# Patient Record
Sex: FEMALE | Race: WHITE
[De-identification: names, ages, dates, MRNs, and addresses within clinical notes are randomized per-mention and may not be internally consistent; named-entity substitution may affect disease eponyms.]

---

## 2017-08-12 ENCOUNTER — HOSPITAL ENCOUNTER (INPATIENT)
Dept: HOSPITAL 84 - D.ER | Age: 52
LOS: 4 days | Discharge: HOME | DRG: 313 | End: 2017-08-16
Attending: FAMILY MEDICINE | Admitting: FAMILY MEDICINE
Payer: COMMERCIAL

## 2017-08-12 VITALS
BODY MASS INDEX: 30.94 KG/M2 | HEIGHT: 66 IN | BODY MASS INDEX: 30.94 KG/M2 | WEIGHT: 192.53 LBS | HEIGHT: 66 IN | WEIGHT: 192.53 LBS

## 2017-08-12 DIAGNOSIS — M32.9: ICD-10-CM

## 2017-08-12 DIAGNOSIS — K21.9: ICD-10-CM

## 2017-08-12 DIAGNOSIS — M35.00: ICD-10-CM

## 2017-08-12 DIAGNOSIS — I16.0: ICD-10-CM

## 2017-08-12 DIAGNOSIS — E11.9: ICD-10-CM

## 2017-08-12 DIAGNOSIS — R07.9: Primary | ICD-10-CM

## 2017-08-12 LAB
ALBUMIN SERPL-MCNC: 3.6 G/DL (ref 3.4–5)
ALP SERPL-CCNC: 108 U/L (ref 46–116)
ALT SERPL-CCNC: 44 U/L (ref 10–68)
ANION GAP SERPL CALC-SCNC: 8.5 MMOL/L (ref 8–16)
BASOPHILS NFR BLD AUTO: 0.2 % (ref 0–2)
BILIRUB SERPL-MCNC: 0.32 MG/DL (ref 0.2–1.3)
BUN SERPL-MCNC: 15 MG/DL (ref 7–18)
CALCIUM SERPL-MCNC: 9.2 MG/DL (ref 8.5–10.1)
CHLORIDE SERPL-SCNC: 100 MMOL/L (ref 98–107)
CK MB SERPL-MCNC: 0.8 U/L (ref 0–3.6)
CK SERPL-CCNC: 81 UL (ref 21–215)
CO2 SERPL-SCNC: 32.4 MMOL/L (ref 21–32)
CREAT SERPL-MCNC: 0.9 MG/DL (ref 0.6–1.3)
EOSINOPHIL NFR BLD: 0.9 % (ref 0–7)
ERYTHROCYTE [DISTWIDTH] IN BLOOD BY AUTOMATED COUNT: 13 % (ref 11.5–14.5)
GLOBULIN SER-MCNC: 4.4 G/L
GLUCOSE SERPL-MCNC: 296 MG/DL (ref 74–106)
HCT VFR BLD CALC: 39.5 % (ref 36–48)
HGB BLD-MCNC: 13.1 G/DL (ref 12–16)
IMM GRANULOCYTES NFR BLD: 0 % (ref 0–5)
LYMPHOCYTES NFR BLD AUTO: 32.6 % (ref 15–50)
MCH RBC QN AUTO: 30.3 PG (ref 26–34)
MCHC RBC AUTO-ENTMCNC: 33.2 G/DL (ref 31–37)
MCV RBC: 91.2 FL (ref 80–100)
MONOCYTES NFR BLD: 7.3 % (ref 2–11)
NEUTROPHILS NFR BLD AUTO: 59 % (ref 40–80)
OSMOLALITY SERPL CALC.SUM OF ELEC: 285 MOSM/KG (ref 275–300)
PLATELET # BLD: 241 10X3/UL (ref 130–400)
PMV BLD AUTO: 10.4 FL (ref 7.4–10.4)
POTASSIUM SERPL-SCNC: 3.9 MMOL/L (ref 3.5–5.1)
PROT SERPL-MCNC: 8 G/DL (ref 6.4–8.2)
RBC # BLD AUTO: 4.33 10X6/UL (ref 4–5.4)
SODIUM SERPL-SCNC: 137 MMOL/L (ref 136–145)
TROPONIN I SERPL-MCNC: < 0.017 NG/ML (ref 0–0.06)
WBC # BLD AUTO: 5.6 10X3/UL (ref 4.8–10.8)

## 2017-08-12 NOTE — ST
PATIENT:DIANE KLEIN                        MEDICAL RECORD: [id number]
SEX: F                                                   LOCATION:D.     D.212
ORDER #:                                                 ADMISSION DATE: 08/13/17
AGE OF PATIENT: 52            
 
REFERRING PHYSICIAN:                                                             
 
INTERPRETING PHYSICIAN: EMIR ROSARIO MD             

 
 
DATE OF SERVICE:  08/15/2017
 
Nuclear Stress Test
 
INDICATION:  Chest pain of unknown etiology.  She was exercised on standard
Lexiscan protocol with 30.5 mCi of sestamibi injected at peak stress.  Rest
images were done previously with 12.5 mCi.
 
FINDINGS:  Gated SPECT reveals preserved ejection fraction at 71% with good wall
motion and thickening and brightening throughout all segments.  SPECT imaging
Cardiolite was used as myocardial perfusion agent.  There is homogeneous uptake
throughout all segments at rest and stress with no evidence of inducible
ischemia or previous infarction.
 
OVERALL IMPRESSION:
1.  This is a normal nuclear stress test with no evidence of inducible ischemia
or previous infarction.
2.  Gated SPECT reveals preserved ejection fraction at 71%.  In this patient
with ongoing symptomatology, the current scan does not suggest the presence of
hemodynamically significant coronary artery disease.  Evaluate noncardiac
etiology of chest pain.
 
TRANSINT:[id number] Voice Confirmation ID: [id number] DOCUMENT ID: [id number]
 
 
                                           
                                           EMIR ROSARIO MD             
 
 
 
 
 
 
 
 
 
 
 
 
CC: CLEO POE DO                                          [id number]
DICTATION DATE: 08/16/17 1151     :     08/16/17 2146      DIS IN  
                                                                      08/16/17
[id number] [place] [address]

## 2017-08-13 VITALS — SYSTOLIC BLOOD PRESSURE: 119 MMHG | DIASTOLIC BLOOD PRESSURE: 65 MMHG

## 2017-08-13 VITALS — SYSTOLIC BLOOD PRESSURE: 129 MMHG | DIASTOLIC BLOOD PRESSURE: 64 MMHG

## 2017-08-13 VITALS — DIASTOLIC BLOOD PRESSURE: 52 MMHG | SYSTOLIC BLOOD PRESSURE: 103 MMHG

## 2017-08-13 VITALS — DIASTOLIC BLOOD PRESSURE: 77 MMHG | SYSTOLIC BLOOD PRESSURE: 140 MMHG

## 2017-08-13 VITALS — DIASTOLIC BLOOD PRESSURE: 60 MMHG | SYSTOLIC BLOOD PRESSURE: 127 MMHG

## 2017-08-13 VITALS — SYSTOLIC BLOOD PRESSURE: 112 MMHG | DIASTOLIC BLOOD PRESSURE: 53 MMHG

## 2017-08-13 LAB
CK MB SERPL-MCNC: 1.3 U/L (ref 0–3.6)
CK MB SERPL-MCNC: 1.7 U/L (ref 0–3.6)
CK SERPL-CCNC: 130 UL (ref 21–215)
CK SERPL-CCNC: 138 UL (ref 21–215)
TROPONIN I SERPL-MCNC: < 0.017 NG/ML (ref 0–0.06)
TROPONIN I SERPL-MCNC: < 0.017 NG/ML (ref 0–0.06)

## 2017-08-13 NOTE — NUR
PT C/O ITCHING WITH MORPHINE AND IT REQUESTING MORPHINE NOW FOR CHEST PAIN AND
ALSO WANTS BENADRYL FOR THE ITCHING. PAGE AND RETURN CALL FROM DR VILLARREAL AND
GAVE ORDERS TO STOP THE MORPHINE, THAT THE PAIN IS NOT CARDIAC IN NATURE AND
TO GIVE LORTAB 7.5MG EVERY FOUR HOURS AS NEEDED FOR PAIN.

## 2017-08-13 NOTE — NUR
MORPHNE 5MG SIVP GIVEN FOR C/O CP 7/10. PHENERGAN 25MG IV TO L GLUTEAL FOR C/O
NAUSEA.  WILL CONTINUE TO MONITOR.

## 2017-08-13 NOTE — NUR
ADMISSION ASSESSMENT, HISTORY AND HOME MED LIST COMPLETED.  SR PER CM.  O2
2LNC.  NO DISTRESS NOTED. PT HAS C/O CP 7/10.  WILL ADMINISTER IV MORPHINE.
WILL CONTINUE TO MONITOR.

## 2017-08-13 NOTE — NUR
ASSESSMENT COMPLETED. TELEMERTY SHOWS SR 66. 02 AT 2 L/M PER NC. SL TO RIGHT
AC. UP AB SAM. DENIES ANY NEEDS. CALL LIGHT IN REACH WITH SR UP. WILL MONITOR

## 2017-08-13 NOTE — NUR
PT RESTING IN BED WITH  AT BEDSIDE. ALERT/ORIENTED. REQUESTING PAIN
MED, BUT WANTS TO RECEIVE BENADRYL AT THE SAME TIME. STATES PAIN IN CHEST WHEN
SHE BREATHES IN AND OUT. PT WILL BE NPO AFTER MIDNIGHT FOR CARDIOLYTE STRESS
TEST IN AM. SR PER TELEMETRY. O2 @ 2L/NC WITH NONLABORED RESPIRATIONS. FSBS
EVERY 6 HOURS. SEE ASSESSMENT. CPOC.

## 2017-08-13 NOTE — NUR
VSS THROUGHOUT NIGHT.  SR PER CM.  PT RESTED WELL AFTER MORPHINE AND PHENERGAN
ADMINISTRATION.  NEEDS MET;WILL CONTINUE TO MONITOR.

## 2017-08-14 VITALS — DIASTOLIC BLOOD PRESSURE: 54 MMHG | SYSTOLIC BLOOD PRESSURE: 99 MMHG

## 2017-08-14 VITALS — SYSTOLIC BLOOD PRESSURE: 100 MMHG | DIASTOLIC BLOOD PRESSURE: 56 MMHG

## 2017-08-14 VITALS — SYSTOLIC BLOOD PRESSURE: 115 MMHG | DIASTOLIC BLOOD PRESSURE: 62 MMHG

## 2017-08-14 VITALS — DIASTOLIC BLOOD PRESSURE: 54 MMHG | SYSTOLIC BLOOD PRESSURE: 110 MMHG

## 2017-08-14 VITALS — DIASTOLIC BLOOD PRESSURE: 53 MMHG | SYSTOLIC BLOOD PRESSURE: 115 MMHG

## 2017-08-14 VITALS — DIASTOLIC BLOOD PRESSURE: 53 MMHG | SYSTOLIC BLOOD PRESSURE: 112 MMHG

## 2017-08-14 LAB
ALBUMIN SERPL-MCNC: 3 G/DL (ref 3.4–5)
ALP SERPL-CCNC: 134 U/L (ref 46–116)
ALT SERPL-CCNC: 111 U/L (ref 10–68)
ANION GAP SERPL CALC-SCNC: 7.9 MMOL/L (ref 8–16)
BASOPHILS NFR BLD AUTO: 0.2 % (ref 0–2)
BILIRUB SERPL-MCNC: 0.27 MG/DL (ref 0.2–1.3)
BUN SERPL-MCNC: 26 MG/DL (ref 7–18)
CALCIUM SERPL-MCNC: 8.5 MG/DL (ref 8.5–10.1)
CHLORIDE SERPL-SCNC: 99 MMOL/L (ref 98–107)
CO2 SERPL-SCNC: 33.2 MMOL/L (ref 21–32)
CREAT SERPL-MCNC: 1 MG/DL (ref 0.6–1.3)
EOSINOPHIL NFR BLD: 0.3 % (ref 0–7)
ERYTHROCYTE [DISTWIDTH] IN BLOOD BY AUTOMATED COUNT: 13.5 % (ref 11.5–14.5)
GLOBULIN SER-MCNC: 3.8 G/L
GLUCOSE SERPL-MCNC: 142 MG/DL (ref 74–106)
HCT VFR BLD CALC: 35.3 % (ref 36–48)
HGB BLD-MCNC: 11.7 G/DL (ref 12–16)
IMM GRANULOCYTES NFR BLD: 0.2 % (ref 0–5)
LYMPHOCYTES NFR BLD AUTO: 17 % (ref 15–50)
MCH RBC QN AUTO: 30.4 PG (ref 26–34)
MCHC RBC AUTO-ENTMCNC: 33.1 G/DL (ref 31–37)
MCV RBC: 91.7 FL (ref 80–100)
MONOCYTES NFR BLD: 7.4 % (ref 2–11)
NEUTROPHILS NFR BLD AUTO: 74.9 % (ref 40–80)
OSMOLALITY SERPL CALC.SUM OF ELEC: 278 MOSM/KG (ref 275–300)
PLATELET # BLD: 234 10X3/UL (ref 130–400)
PMV BLD AUTO: 10.8 FL (ref 7.4–10.4)
POTASSIUM SERPL-SCNC: 4.1 MMOL/L (ref 3.5–5.1)
PROT SERPL-MCNC: 6.8 G/DL (ref 6.4–8.2)
RBC # BLD AUTO: 3.85 10X6/UL (ref 4–5.4)
SODIUM SERPL-SCNC: 136 MMOL/L (ref 136–145)
WBC # BLD AUTO: 6.3 10X3/UL (ref 4.8–10.8)

## 2017-08-14 NOTE — NUR
RECEIVED REPORT, WILL ASSUME CARE OF PT, WATCHING TV, DENIES ANY NEEDS, CALL
LIGHT IN REACH, BED IS LOW, SRX2, WILL CONTINUE PLAN OF CARE

## 2017-08-15 VITALS — SYSTOLIC BLOOD PRESSURE: 122 MMHG | DIASTOLIC BLOOD PRESSURE: 65 MMHG

## 2017-08-15 VITALS — DIASTOLIC BLOOD PRESSURE: 68 MMHG | SYSTOLIC BLOOD PRESSURE: 134 MMHG

## 2017-08-15 VITALS — SYSTOLIC BLOOD PRESSURE: 122 MMHG | DIASTOLIC BLOOD PRESSURE: 56 MMHG

## 2017-08-15 VITALS — SYSTOLIC BLOOD PRESSURE: 123 MMHG | DIASTOLIC BLOOD PRESSURE: 62 MMHG

## 2017-08-15 VITALS — SYSTOLIC BLOOD PRESSURE: 148 MMHG | DIASTOLIC BLOOD PRESSURE: 77 MMHG

## 2017-08-15 NOTE — NUR
ASSESSMENT COMPLETE, SEE FLOWSHEET, PT SLEEPING, BED IS LOW, SRX2, CALL LIGHT
IN REACH, WILL CONTINUE TO MONITOR

## 2017-08-16 VITALS — DIASTOLIC BLOOD PRESSURE: 80 MMHG | SYSTOLIC BLOOD PRESSURE: 164 MMHG

## 2017-08-16 VITALS — DIASTOLIC BLOOD PRESSURE: 82 MMHG | SYSTOLIC BLOOD PRESSURE: 197 MMHG

## 2017-08-16 LAB
ANION GAP SERPL CALC-SCNC: 9.3 MMOL/L (ref 8–16)
BUN SERPL-MCNC: 13 MG/DL (ref 7–18)
CALCIUM SERPL-MCNC: 8.6 MG/DL (ref 8.5–10.1)
CHLORIDE SERPL-SCNC: 103 MMOL/L (ref 98–107)
CO2 SERPL-SCNC: 30.9 MMOL/L (ref 21–32)
CREAT SERPL-MCNC: 0.6 MG/DL (ref 0.6–1.3)
GLUCOSE SERPL-MCNC: 196 MG/DL (ref 74–106)
OSMOLALITY SERPL CALC.SUM OF ELEC: 282 MOSM/KG (ref 275–300)
POTASSIUM SERPL-SCNC: 4.2 MMOL/L (ref 3.5–5.1)
SODIUM SERPL-SCNC: 139 MMOL/L (ref 136–145)

## 2017-08-16 NOTE — NUR
WENT OVER DC INSTRUCTIONS WTIH PT AND  BOTH VERBALIZE UNDERSTANDING. DC
PIV WITH CATH TIP INTACT DC TELE AND RETURNED TO MONITOR TECH. PT REFUSED
WHEELCHAIR AND WALKED OUT WITH

## 2017-08-16 NOTE — DS
PATIENT:DIANE KLEIN                :[birth year]   MEDICAL RECORD: [id number]
 
                              DISCHARGE SUMMARY
                                                         
ADMISSION DATE:    17                       DISCHARGE DATE:     17
 
 
DATE OF ADMISSION:  2017
 
DATE OF DISCHARGE:  2017
 
ADMISSION DIAGNOSES:  Chest pain, hypertensive crisis, history of
hypothyroidism, diabetes, lupus, Sjogren syndrome and gastroparesis.
 
DISCHARGE DIAGNOSES:  Hypertensive crisis, chest pain, diabetes, lupus,
Sjogren's and hypertension.
 
HOSPITAL COURSE:  The patient was admitted to the Emergency Room with
significantly elevated blood pressure with anginal symptoms.  Blood pressure was
controlled.  Cardiology consulted.  She had a normal echocardiogram.  She
underwent a nuclear stress test, which showed no evidence of ischemic disease. 
Cleared for discharge by cardiology.  Discharged home in stable condition.  We
will follow up in the clinic on Friday.  Medications per med rec at this time. 
Adjustments in blood pressure medications have been made.  The patient is
anxious to go home.  We will complete workup as an outpatient.
 
PHYSICAL EXAMINATION:
VITAL SIGNS:  On discharge, temperature 97.9, blood pressure 148/77, heart rate
74, respirations 16 and O2 sats 98%.
GENERAL:  Alert and oriented, no acute distress.   present, anxious for
discharge.
HEART:  Regular rate and rhythm.
LUNGS:  Clear.
ABDOMEN:  Soft and nontender.
EXTREMITIES:  Present times 4.
NEUROLOGIC:  Intact.  No focal deficits.
 
LABORATORY DATA:  Chemistry on discharge:  Sodium 139, potassium 4.2, chloride
103, bicarbonate 30.9, BUN 13, creatinine 0.6, glucose 196 and calcium 8.6. 
Cardiac enzymes were cycled and have remained negative.
 
DISPOSITION:  The patient is discharged again in stable and improved condition. 
We will follow up on Friday and remain off work until evaluation on Friday.
 
TRANSINT:[id number] Voice Confirmation ID: [id number] DOCUMENT ID: [id number]
                                           
                                           CLEO POE DO            
 
 
 
Electronically Signed by CLEO LEHMAN on 17 at 1750
CC:                                                             [id number]
DICTATION DATE: 17 0748     :     17 0833      DIS IN  
                                                                      17
[id number] [place] [address]

## 2017-08-16 NOTE — EC
PATIENT:DIANE KLEIN              DATE OF SERVICE: 08/13/17
SEX: F                                  MEDICAL RECORD: [id number]
DATE OF BIRTH: [birth year]                        LOCATION:D.      D.212
AGE OF PATIENT: 52                             ADMISSION DATE: 08/13/17
 
REFERRING PHYSICIAN:                               
 
INTERPRETING PHYSICIAN: NORBERTO VILLARREAL MD              
 
 
 
                             ECHOCARDIOGRAM REPORT
  ECHO CHARGES 4               ECHO COMPLETE            
 
 
 
CLINICAL DIAGNOSIS: CP                            
 
                         ECHOCARDIOGRAPHIC MEASUREMENTS
      (adult normal given)
   AC root (d.<3.7cm) 2.9  cm   LV Septum d (<1.2 cm> 1.2  cm
      Valve Excursion 1.5  cm     LV Septum (systole) 1.9  cm
Left Atria (s.<4.0cm> 3.8  cm          LVPW d(<1.2cm) 1.3  cm
        RV (d.<2.3cm) 2.4  cm           LVPW (sytole) 2.1  cm
  LV diastole(<5.6CM) 5.0  cm       MV E-F(>70mm/sec)      cm
           LV systole 2.6  cm           LVOT Diameter 1.7  cm
       MV exc.(>10mm)      cm
Est.ejection fraction (50-75%)     %   Pericardial Effusion N
 
   DOPPLER:
     LVIT      cm/sec A 66.0 cm/sec E 89.0  cm/sec
       LA      cm/sec      RVSP 24.3 mmHg
     LVOT 131  cm/sec   AOP1/2T      m/s
  Asc. Ao 169  cm/sec
     RVOT 68.0 cm/sec
       RA      cm/sec
       PA 97.0 cm/sec
 AV Gradient Peak 11.4 mmHg  AV Mean 6.3  mmHg  AV Area 2.4  cm
 MV Gradient Peak 5.2  mmHg  MV Mean 2.2  mmHg  MV Area      cm
   COMMENTS:                                              
 
 
 Cardiac Sonographer: Tal HARRINGTONOE            
      Cardiologist: 4          Dr. Villarreal               
             TAPE# PACS           
                                                                                     
 
 
DATE OF SERVICE:  08/15/2017
 
PROCEDURE:  Echocardiogram
 
FINDINGS:
1.  The left ventricle, although difficult to visualize, appears to have normal
systolic function, the patient's inflow pattern was normal.  The ejection
fraction 65%.
2.  The right ventricle is normal size and function.
3.  The aortic valve is structurally normal.
 
 
 
ECHOCARDIOGRAM REPORT                          [id number]    DIANE KLEIN      
 
 
4.  The tricuspid valve is structurally normal and normal function.
5.  The aortic valve has mild thickening without any other significant
pathology.
6.  Pulmonic valve is normal.
 
CONCLUSION:  The patient's overall heart study on echocardiography is normal for
the patient's stated age without any significant pathology identified.
 
TRANSINT:[id number] Voice Confirmation ID: [id number] DOCUMENT ID: [id number]
                                           
                                           NORBERTO VILLARREAL MD              
 
 
 
Electronically Signed by NORBERTO VILLARREAL on 08/16/17 at 1203
 
 
 
 
 
 
 
 
 
 
 
 
 
 
 
 
 
 
 
 
 
 
 
 
 
 
 
 
 
 
 
CC:                                                             [id number]
DICTATION DATE: 08/15/17 0857     :     08/15/17 1049      DIS IN  
                                                                      08/16/17
[id number] [place] [address]

## 2017-08-16 NOTE — NUR
Patient Name: DIANE KLEIN Admission Status: ER
Accout number: [id number] Admission Date: 2017
: 1965 Admission Diagnosis:CHEST PAIN, UNSPECIFIED
Attending: FERNANDO Current LOS: 3
 
Anticipated DC Date: 2017
Planned Disposition: Home
Primary Insurance: [employer] O
 
LATE ENTRY:
Discharge Planning Comments:
* Is the patient Alert and Oriented? Yes 0
* How many steps to enter\exit or inside your home? NONE 0
* PCP DR. POE 0
* Pharmacy [facility] 0
* Preadmission Environment Home with Family 0
* ADLs Independent 0
* Equipment Glucometer 0
* Other Equipment [employer] - MEDICAL EQUIPMENT PROVIDER PREFERENCE 0
* List name and contact numbers for known caregivers / representatives who
currently or will assist patient after discharge: DAYA BATES, SIGNIFICANT
OTHER, [phone] 0
* Community resources currently utilized None 0
* Please name any agencies selected above. NONE 0
* Additional services required to return to the preadmission environment? No 0
* Can the patient safely return to the preadmission environment? Yes 0
* Has this patient been hospitalized within the prior 30 days at any hospital?
No 0
 
CM MET WITH PT IN ROOM TO DISCUSS DISCHARGE PLANNING AND NEEDS. PT REPORTS
LIVING AT HOME INDEPENDENTLY WITH HER SPOUSE. PT HAS GLUCOMETER, [employer] IS
MEDICAL EQUIPMENT PROVIDER. PT HAS NO OUTSIDE SERVICES ASSISTING IN THE HOME.
CM DISCUSSED AVAILABILITY OF HOME HEALTH, REHAB SERVICES AND MEDICAL
EQUIPMENT. PT DENIES DISCHARGE NEEDS, REPORTS HER SPOUSE WILL PICK HER UP FOR
DISCHARGE HOME. IMPORTANT MESSAGE FROM MEDICARE PROVIDED AND EXPLAINED.
 
 
: Anurag Banda

## 2017-11-10 ENCOUNTER — HOSPITAL ENCOUNTER (OUTPATIENT)
Dept: HOSPITAL 84 - D.CATH | Age: 52
LOS: 1 days | Discharge: HOME | End: 2017-11-11
Attending: INTERNAL MEDICINE
Payer: COMMERCIAL

## 2017-11-10 VITALS — SYSTOLIC BLOOD PRESSURE: 180 MMHG | DIASTOLIC BLOOD PRESSURE: 92 MMHG

## 2017-11-10 VITALS — BODY MASS INDEX: 31.8 KG/M2

## 2017-11-10 VITALS — DIASTOLIC BLOOD PRESSURE: 101 MMHG | SYSTOLIC BLOOD PRESSURE: 128 MMHG

## 2017-11-10 VITALS — SYSTOLIC BLOOD PRESSURE: 182 MMHG | DIASTOLIC BLOOD PRESSURE: 96 MMHG

## 2017-11-10 DIAGNOSIS — I10: ICD-10-CM

## 2017-11-10 DIAGNOSIS — I25.119: Primary | ICD-10-CM

## 2017-11-10 DIAGNOSIS — Z01.812: ICD-10-CM

## 2017-11-10 DIAGNOSIS — R07.9: ICD-10-CM

## 2017-11-10 LAB
ANION GAP SERPL CALC-SCNC: 14.3 MMOL/L (ref 8–16)
BASOPHILS NFR BLD AUTO: 0 % (ref 0–2)
BUN SERPL-MCNC: 10 MG/DL (ref 7–18)
CALCIUM SERPL-MCNC: 9.2 MG/DL (ref 8.5–10.1)
CHLORIDE SERPL-SCNC: 100 MMOL/L (ref 98–107)
CO2 SERPL-SCNC: 26.7 MMOL/L (ref 21–32)
CREAT SERPL-MCNC: 0.6 MG/DL (ref 0.6–1.3)
EOSINOPHIL NFR BLD: 2.6 % (ref 0–7)
ERYTHROCYTE [DISTWIDTH] IN BLOOD BY AUTOMATED COUNT: 13 % (ref 11.5–14.5)
GLUCOSE SERPL-MCNC: 312 MG/DL (ref 74–106)
HCT VFR BLD CALC: 39.1 % (ref 36–48)
HGB BLD-MCNC: 13.3 G/DL (ref 12–16)
IMM GRANULOCYTES NFR BLD: 0.2 % (ref 0–5)
LYMPHOCYTES NFR BLD AUTO: 33.6 % (ref 15–50)
MCH RBC QN AUTO: 30.3 PG (ref 26–34)
MCHC RBC AUTO-ENTMCNC: 34 G/DL (ref 31–37)
MCV RBC: 89.1 FL (ref 80–100)
MONOCYTES NFR BLD: 6.7 % (ref 2–11)
NEUTROPHILS NFR BLD AUTO: 56.9 % (ref 40–80)
OSMOLALITY SERPL CALC.SUM OF ELEC: 284 MOSM/KG (ref 275–300)
PLATELET # BLD: 255 10X3/UL (ref 130–400)
PMV BLD AUTO: 10.8 FL (ref 7.4–10.4)
POTASSIUM SERPL-SCNC: 4 MMOL/L (ref 3.5–5.1)
RBC # BLD AUTO: 4.39 10X6/UL (ref 4–5.4)
SODIUM SERPL-SCNC: 137 MMOL/L (ref 136–145)
WBC # BLD AUTO: 4.6 10X3/UL (ref 4.8–10.8)

## 2017-11-10 NOTE — NUR
0945 SITTING UP TALKING WITH BOYFRIEND AT BEDSIDE. NSR, RATE 81 W NO
C/O CHEST PAIN. R WRIST TR BAND C/D/I W NO HEMATOMA OR BLEEDING.
SIPPING WATER WITH NO C/O NAUSEA. DENIES NEEDS AT THIS TIME.

## 2017-11-10 NOTE — NUR
2CC AIR REMOVED FROM R WRIST TR BAND. WILL MONITOR CLOSELY FOR
BLEEDING. SITTING UP IN BED EATING TURKEY SANDWICH.

## 2017-11-10 NOTE — HEMODYNAMI
PATIENT:DIANE KLEIN                               MEDICAL RECORD: [id number]
: 61                                            LOCATION:D.CAT          
ACCT# [id number]                                       ADMISSION DATE: 11/10/17
 
 
 Generatedon:11/10/71866:19
Patient name: DIANE KLEIN Patient #: [id number] Visit #: [id number] SSN: 
: 1965 Date
of study: 11/10/2017
Page: Of
Hemodynamic Procedure Report
****************************
Patient Data
Patient Demographics
Procedure consent was obtained
First Name: DIANE           Gender: Female
Last Name: LATOYA          : 1965
[facility] Initial: CHANCE        Age: 52 year(s)
Patient #: [id number]       Race: Unknown
Visit #: [id number]
Accession #:
[id number]
Additional ID: [id number]
Contact details
Address: [address] [place] [address]   Phone: [phone]
circle
State: [place]
City: [place]
Zip code: [address]
Past Medical History
Allergies
Allergen        Reaction        Date         Comments
Reported
Other allergy                   11/10/2017   IV contrast, Sulfa
Admission
Admission Data
Admission Date: 11/10/2017  Admission Time: 6:44
Height (in.): 66            BSA: 2 (m2)
Height (cm.): 167.64        BMI: 32.38 (kg/m2)
Weight (lbs.): 200.62
Weight (kg.): 91
Lab Results
Lab Result Date: 11/10/2017 Lab Result Time: 0:00
Biochemistry
Name         Units    Result                Min      Max
BUN          mg/dl    10       --(-*--)--   7        18
Creatinine   mg/dl    0.6      --(*---)--   0.6      1.3
CBC
Name         Units    Result                Min      Max
Hemoglobin   g/dl     13.3     -*(----)--   13.5     17.5
Procedure
Procedure Types
Cath Procedure
Diagnostic Procedure
[facility] w/Coronaries
Miscellaneous Procedures
Moderate Sedation up to 15 minutes
Procedure Description
 
Procedure Date
Procedure Date: 11/10/2017
Procedure Start Time: 9:02
Procedure End Time: 9:18
Procedure Staff
Name                            Function
Valente Rice MD                  Performing Physician
Carol Watt RT               Scrub
Manny Gambino RN              Nurse
Kev Silva RT                  Monitor
Procedure Data
Cath Procedure
Fluoroscopy
Diagnostic fluoroscopy      Total fluoroscopy Time: 2.7
time: 2.7 min               min
Diagnostic fluoroscopy      Total fluoroscopy dose: 347
dose: 347 mGy               mGy
Contrast Material
Contrast Material Type                       Amount (ml)
Isovue 300                                   36
Entry Location
Entry     Primary  Successful  Side  Size  Upsize Upsize Entry    Closure     Lin
ccessful  Closure
Location                             (Fr)  1 (Fr) 2 (Fr) Remarks  Device        
          Remarks
Radial                         Right 6 Fr                         Mechanical
artery                               Short                        Compression
Estimated blood loss: 10 ml
Diagnostic catheters
Device Type               Used For           End Catheter
Placement
Terumo 5Fr Ean 110cm    Procedure
catheter
Procedure Complications
No complications
Procedure Medications
Medication           Administration Route Dosage
0.9% NaCl            I.V.                 100 ml/hr
Oxygen               NC                   2 l/min
Heparin Flush Bag    added to field       2 bags
(1000units/500ml NS)
Lidocaine 2%         added to field       20
Radial Cocktail      added to field       1 syringe
(Verapomil 2mg/Nitro
400mcg/Heparin
1500units)
Versed               I.V.                 2 mg
Fentanyl             I.V.                 25 mcg
Radial Cocktail      I.A.                 1 syringe
(Verapomil 2mg/Nitro
400mcg/Heparin
1500units)
Hemodynamics
Rest
BSA: 2 (m2) HGB: 13.3 (g/dl) O2 Consumption: Estimated: 200.78 (ml/min) O2 Consu
mption indexed:
Estimated:100.39 (ml/min/m) Heart Rate: 80 (bpm)
Pressure Samples
Time     Site     Value (mmHg) Purpose      Heart      Use
Rate(bpm)
 
9:12     LV       47/-5,-8     EDP          135
Gradients
Valve  Time  Site Site Mean    SEP/DFP    Peak To Heart  Use
1    2    (mmHg)  (sec/min)  Peak    Rate
(mmHg)  (bpm)
Aortic 9:12  LV   AO                              103
Snapshots
Pre Cath      Intra         NCS           Post Cath
Vital Signs
Time    Heart  Resp   SPO2 etCO2   NIBP (mmHg)  Rhythm  Pain    Sedation
Rate   (ipm)  (%)  (mmHg)                       Status  Level
(bpm)
8:51:51 82     14     99   37.9    190/101(152) NSR     0 (11)  10(A)
, No
pain
8:56:42 80     14     99   40.2    196/107(162) NSR     0 (11)  10(A)
, No
pain
9:02:14 86     16     100  40.9    200/109(156) NSR     0 (11)  10(A)
, No
pain
9:07:01 90     18     96   42.5    194/99(148)  NSR     0 (11)  9(A)
, No
pain
9:11:46 101    18     93   38.6    168/92(130)  NSR     0 (11)  10(A)
, No
pain
9:16:28 93     18     95   39.4    165/93(126)  NSR     0 (11)  10(A)
, No
pain
Medications
Time    Medication       Route  Dose    Verified Delivered Reason       Notes  E
ffectiveness
by       by
8:53:49 0.9% NaCl        I.V.   100     Manny  Manny   Per
ml/hr   Maki Gambino   physician
RN       RN
8:54:02 Oxygen           NC     2 l/min Manny  Manny   Per
Maki Gambino   physician
RN       RN
8:54:25 Heparin Flush    added  2 bags  Manny  Manny   used for
Bag              to             Maki Gambino   procedure
(1000units/500ml field          RN       RN
NS)
8:54:43 Lidocaine 2%     added  20ml    Manny  Manny   for local
to     vial    Lorigan  Maki   anesthetic
          RN       RN
8:55:02 Radial Cocktail  added  1       Manny  Manny   used for
(Verapomil       to     syringe Lorigan  Maki   procedure
2mg/Nitro        field          RN       RN
400mcg/Heparin
1500units)
9:01:21 Versed           I.V.   2 mg    Manny  Manny   for sedation
Maki Gambino RN       RN
9:01:37 Fentanyl         I.V.   25 mcg  Manny  Manny   for sedation
Maki Gambino RN       RN
9:06:50 Radial Cocktail  I.A.   1       Manny  Valente      for
(Verapomil              syringe Maki Rice MD vasodilation
 
2mg/Nitro                       RN
400mcg/Heparin
1500units)
Procedure Log
Time     Note
8:38:37  Manny Gambino RN sent for patient. Start room use.
8:38:38  Time tracking: Regular hours
8:38:42  Plan of Care:Hemodynamics will remain stable., Cardiac
rhythm will remain stable., Comfort level will be
maintained., Respiratory function will remain
adequate., Patient/ family verbilizes understanding of
procedure., Procedure tolerated without complication.,
Recovers from procedure without complications..
8:39:10  H&P Date Dictated: 2017 Within 30 days and on
chart., H&P Addendum completed by physician on day of
procedure. (MUST COMPLETE FOR ALL OUTPATIENTS).
8:43:09  Patient received from Pre/Post Procedure Room to CCL 1
Alert and oriented. Tansferred to table in Supine
position.
8:43:10  Warm blankets applied, and charbel hugger turned on for
patient comfort.
8:43:11  Correct patient and procedure confirmed by team.
8:43:13  Signed procedure consent form obtained from patient.
8:43:14  ECG and BP/O2 sat monitors applied to patient.
8:50:05  Vital chart was started
8:50:08  Baseline sample Acquired.
8:53:49  0.9% NaCl 100 ml/hr I.V. was administered by Manny Gambino RN; Per physician;
8:54:02  Oxygen 2 l/min NC was administered by Manny Gambino
RN; Per physician;
8:54:25  Heparin Flush Bag (1000units/500ml NS) 2 bags added to
field was administered by Manny Gambino RN; used for
procedure;
8:54:43  Lidocaine 2% 20ml vial added to field was administered
by Manny Gambino RN; for local anesthetic;
8:55:02  Radial Cocktail (Verapomil 2mg/Nitro 400mcg/Heparin
1500units) 1 syringe added to field was administered
by Manny Gambino RN; used for procedure;
8:57:29  Pre-procedure instructions explained to patient.
8:57:29  Pre-op teaching completed and patient verbalized
understanding.
8:57:41  Family in waiting room.
8:57:43  Patient NPO since Midnight.
8:57:58  Patient allergic to Other allergyIV contrast, Sulfa
8:58:00  Is the patient allergic to Iodine/contrast media? Yes.
8:58:01  Was the patient premedicated? Yes
8:58:13  Is patient on blood thinner?No
8:58:31  Rhythm: sinus rhythm
8:58:58  Full Disclosure recording started
8:59:06  Patient diabetic? Yes.
8:59:07  If diabetic: On Metformin? Yes
8:59:09  If on Metformin: Last Dose? 2017
8:59:44  Patient not pregnant. Patient has had hysterectomy.
8:59:46  Previous problem with sedation/anesthesia? No ?
8:59:47  Snore? Yes
8:59:48  Sleep apnea? No
8:59:50  Deviated septum? No
8:59:51  Opens mouth fully? Yes
8:59:54  Sticks out tongue? Yes
8:59:55  Airway obstruction? No ?
 
8:59:58  Dentures? No ?
8:59:59  Pre procedure: right dorsailis pedis pulse 1+
Palpable, but thready & weak; easily obliterated
9:00:01  Modified Matthew's test Ulnar < 7 seconds
9:00:03  Patient pain scale 0/10 ?.
9:00:08  IV patent on arrival in left forearm with 0.9% NaCl at
KVO.
9:00:12  Lab results completed and on chart.
9:00:15  Right Radial & Right Groin area was prepped with
chlora-prep and draped in sterile fashion
9:00:17  Alarms reviewed by R. N.
9:00:17  Sharps counted by scrub and verified by R.N.
9:00:18  --------ALL STOP TIME OUT------
9:00:19  Final Timeout: patient, procedure, and site verified
with staff and physician. All members of the team are
in agreement.
9:00:21  Right Radial & Right Groin site verified by team.
9:00:25  Physical assessment completed. ASA score P 2 - A
patient with mild systemic disease as per Valente Rice MD.
9:00:28  Sedation plan: IV Moderate Sedation Versed, Fentanyl
9:00:35  Zero performed for pressure channel P1
9:01:21  Versed 2 mg I.V. was administered by Manny Gambino RN; for sedation;
9:01:37  Fentanyl 25 mcg I.V. was administered by Manny Gambino RN; for sedation;
9:01:47  Use device set Radial Dx
9:01:49  Tegaderm 4 x 4 opened to sterile field.
9:01:50  Acist Hand Control opened to sterile field.
9:01:50  Acist Manifold opened to sterile field.
9:01:52  Acist Syringe opened to sterile field.
9:01:53  Medline Cath Pack opened to sterile field.
9:01:53  Bag Decanter opened to sterile field.
9:01:53  Terumo 6Fr Slender Glidesheath opened to sterile
field.
9:01:54  St Tae 260cm J .035 wire opened to sterile field.
9:01:54  MBrace Wrist Support opened to sterile field.
9:01:59  Procedure started.
9:02:04  Local anesthetic to right radial artery with Lidocaine
2% by Valente Rice MD.**INITIAL ACCESS ONLY**
9:04:49  Lab Result : Hemoglobin 13.3 g/dl
9::49  Lab Result : Creatinine 0.6 mg/dl
9::49  Lab Result : BUN 10 mg/dl
9:04:56  Patient Weight : 200.62 lbs
9:05:03  Patient Height : 66 inches
9:05:18  A 6 Fr Short sheath was inserted into the Right Radial
artery
9:06:50  Radial Cocktail (Verapomil 2mg/Nitro 400mcg/Heparin
1500units) 1 syringe I.A. was administered by Valente Rice MD; for vasodilation;
9:07:20  Sheath damaged upon insertion, new sheath opened and
inserted into right radial artery.
9:07:28  Terumo 6Fr Slender Glidesheath opened to sterile
field.
9:07:41  A Terumo 5Fr Ean 110cm catheter was advanced over
the wire and used for Procedure.
9:09:31  LCA angiography performed.
9:10:02  RCA angiography performed.
9:11:17  Terumo ANGLE 260cm glide wire opened to sterile field.
9:11:49  [place] wire advanced in attempt to cross the aortic
 
valve.
9:12:44  LV angiography performed.
9:12:57  LV gram done using BARRAZA
9:13:02  EF : 55 %
9:13:05  LV hemodynamics recorded.
9:13:09  Injector settings: Ml/sec: 12, Volume: 8,
9:13:39  Catheter removed.
9:13:55  Terumo TR Band Standard opened to sterile field.
9:14:04  Sheath removed intact; hemostasis achieved with
Mechanical Compression to the Right Radial artery.
9:14:06  Procedure ended.(Physican Out)
9:14:20  Fluoroscopy time 02.70 minutes.
9:14:23  Fluoroscopy dose: 347 mGy
9:14:23  Flurop Dose total: 347
9:14:31  Contrast amount:Isovue 300 36ml.
9:14:34  Sharps counted by scrub and verified by R.N.
9:14:36  TR band inflated with 12cc of air.
9:14:38  Insertion/operative site no bleeding no hematoma.
9:14:39  Post Procedure Pulses reassessed and unchanged
9:14:56  Post-procedure physical assessment completed. ASA
score P 2 - A patient with mild systemic disease as
per Valente Rice MD.
9:15:00  Post procedure rhythm: unchanged.
9:15:03  Estimated blood loss: 10 ml
9:15:21  Post procedure instruction explained to
patient.Patient verbalizes understanding.
9:15:22  Patient needs reinforcement of post procedure
teaching.
9:15:34  Procedure and supply charges have been captured,
reviewed, submitted and are correct.
9:15:37  Procedure Complication : No complications
9:18:48  Vital chart was stopped
9:18:49  See physician's report for complete and final results.
9:18:51  Report given to Pre/Post Procedure Room.
9:18:53  Patient transfered to Pre/Post Procedure Room with
Stretcher.
9:18:56  Procedure ended.
9:18:56  Full Disclosure recording stopped
9:19:04  End room use (Document Last)
Device Usage
Item Name   Manufacture  Quantity  Catalog      Hospital Part    Current Minimal
 Lot# /
Number       Charge   Number  Stock   Stock   Serial#
Code
Tegaderm 4             1         [id number]        [id number]   [id number]  [id number]  5
x 4
Acist Hand  Acist        1         33300        897006   766202  854935  5
Control     [employer]
Acist       Acist        1         26883        390112   130529  475712  5
Manifold    [employer]
Acist       Acist        1         81988        458264   066876  914588  20
Syringe     [employer]
Medline     Cardinal     1         [id number]    [id number]   54593   902003  5
Cath Pack   Health
Bag         [employer]     1         2002S        463665   59798   208520  5
[employer]
Terumo 6Fr  Terumo       2         [id number]   756562   257178  130158  40
 
Slender
Glidesheath
St Tae     St Tae      1         599189       562160   551836  365895  30
260cm J
.035 wire
MBrace      Advanced     1         [id number]  650343   09591   536397  5
Wrist       Vascular
Support     Dynamics
Terumo 5Fr  Terumo       1               607547   652565  125216  5
Ean 110cm
catheter
Terumo      Terumo       1         [id number]       633151   392082  554588  5
ANGLE 260cm
glide wire
Terumo TR   Terumo       1         [id number]    072588   107239  938685  40
Band
Standard
Signature Audit Alvin
Stage           Time        Signature      Unsigned
Intra-Procedure 11/10/2017  Kev Silva
9:19:21 AM  RT(R)
Signatures
Monitor : Kev Silva RT Signature :
______________________________
Date : ______________ Time :
______________
 
 
 
 
 
 
 
 
 
 
 
 
 
 
 
 
 
 
 
 
 
 
 
 
 
 
 
 
 
[address] [place] [address]

## 2017-11-10 NOTE — NUR
RECEIVED PT VIA W/C FROM CATH LAB BRACE TO RT WRIST PPPX4 NO SIGNS OF BLEEDING
PT IS AAOX4 RESP UNLABORED TELEMTRY APPLIED  NAD NOTED WILL CONTINUE TO
MONITOR

## 2017-11-10 NOTE — NUR
TR BAND REMOVED. COTTON BALL AND TEGADERM APPLIED. R WRIST BRACE
REAPPLIED. EXPLAINED TO PATIENT THE WEIGHT AND MOVEMENT RESTRICTIONS.
VERBALIZED UNDERSTANDING. WHEELED DOWN VIA WHEELCHAIR TO 2119.

## 2017-11-11 VITALS — SYSTOLIC BLOOD PRESSURE: 117 MMHG | DIASTOLIC BLOOD PRESSURE: 66 MMHG

## 2017-11-11 VITALS — DIASTOLIC BLOOD PRESSURE: 97 MMHG | SYSTOLIC BLOOD PRESSURE: 183 MMHG

## 2017-11-11 VITALS — SYSTOLIC BLOOD PRESSURE: 118 MMHG | DIASTOLIC BLOOD PRESSURE: 67 MMHG

## 2017-11-11 VITALS — DIASTOLIC BLOOD PRESSURE: 73 MMHG | SYSTOLIC BLOOD PRESSURE: 131 MMHG

## 2017-11-11 LAB
ANION GAP SERPL CALC-SCNC: 14.4 MMOL/L (ref 8–16)
BASOPHILS NFR BLD AUTO: 0 % (ref 0–2)
BUN SERPL-MCNC: 24 MG/DL (ref 7–18)
CALCIUM SERPL-MCNC: 9.2 MG/DL (ref 8.5–10.1)
CHLORIDE SERPL-SCNC: 101 MMOL/L (ref 98–107)
CK MB SERPL-MCNC: 0.6 U/L (ref 0–3.6)
CK SERPL-CCNC: 60 UL (ref 21–215)
CO2 SERPL-SCNC: 26.7 MMOL/L (ref 21–32)
CREAT SERPL-MCNC: 0.7 MG/DL (ref 0.6–1.3)
EOSINOPHIL NFR BLD: 0 % (ref 0–7)
ERYTHROCYTE [DISTWIDTH] IN BLOOD BY AUTOMATED COUNT: 12.9 % (ref 11.5–14.5)
GLUCOSE SERPL-MCNC: 287 MG/DL (ref 74–106)
HCT VFR BLD CALC: 37.7 % (ref 36–48)
HGB BLD-MCNC: 13 G/DL (ref 12–16)
IMM GRANULOCYTES NFR BLD: 0.1 % (ref 0–5)
LYMPHOCYTES NFR BLD AUTO: 24.8 % (ref 15–50)
MCH RBC QN AUTO: 30.5 PG (ref 26–34)
MCHC RBC AUTO-ENTMCNC: 34.5 G/DL (ref 31–37)
MCV RBC: 88.5 FL (ref 80–100)
MONOCYTES NFR BLD: 9.3 % (ref 2–11)
NEUTROPHILS NFR BLD AUTO: 65.8 % (ref 40–80)
OSMOLALITY SERPL CALC.SUM OF ELEC: 289 MOSM/KG (ref 275–300)
PLATELET # BLD: 277 10X3/UL (ref 130–400)
PMV BLD AUTO: 10.9 FL (ref 7.4–10.4)
POTASSIUM SERPL-SCNC: 4.1 MMOL/L (ref 3.5–5.1)
RBC # BLD AUTO: 4.26 10X6/UL (ref 4–5.4)
SODIUM SERPL-SCNC: 138 MMOL/L (ref 136–145)
TROPONIN I SERPL-MCNC: < 0.017 NG/ML (ref 0–0.06)
WBC # BLD AUTO: 6.7 10X3/UL (ref 4.8–10.8)

## 2017-11-11 NOTE — NUR
DISCHARGE TEACHING PROVIDED AND PAPERS SIGNED. D/C PTS L.FA PIV WITH CATHETER
TIP FULLY INTACT. PT STATED SHE DIDNT REMEMBER WHAT WAS GOING ON ABOUT HER
APPT WITH , I CALLED AND VERIFIED WITH HIM AND THEIR OFFICE WILL CALL
HER MONDAY WITH MORE INFORMATION AND SX PLANNING. NO FURTHER NEEDS AT THIS
TIME, PT GETTING DRESSED AND BELONGINGS COLLECTED, TRANSPORTATION AT BEDSIDE.
PT VERBALIZED UNDERSTANDING AND DENIES ANY FURTHER QUESTIONS/CONCERNS OR
NEEDS.

## 2017-11-11 NOTE — NUR
INTRODUCED MYSELF TO PT AS PRIMARY RN FOR TODAYS SHIFT. PT A&O SITTING UP IN
BED RESTING QUIETLY. PT STATES "IM HOPING TO BE DISCHARGED TODAY" WILL LOOK
FOR ORDERS. SHIFT ASSESSMENT COMPLETED. PT HAS L.FA WITH PRAVEEN CDI AND SWAB
CAPS IN USE. TELEMETRY RUNNING SR IN THE 80S. S1S2 NOTED RRR. PT STATES SHE
HAS CHRONIC ACHING CP BUT NO ACUTE CHANGES OR PAIN. WILL CONTINUE WITH AM
MEDICATIONS AND INQUIRE ABOUT D/C. CL IN REACH, BED IN LOWEST, SIDE RAILS X2.
WILL CPOC.

## 2017-11-11 NOTE — NUR
TALKED WITH  AND CARDIAC ENZYMES NORMAL AND EKG NORMAL AS WELL.
 TALKING WITH PT AND SHE WILL BE DISCHARGED AND FOLLOW BACK WITH
 IN CLINIC TO DISCUSS SURGERY THAT IS NEEDED.

## 2017-11-14 LAB — HCV AB S/CO SERPL IA: <0.1 (ref 0–0.9)

## 2017-11-16 LAB
ALBUMIN SERPL-MCNC: 3.5 G/DL (ref 3.4–5)
ALP SERPL-CCNC: 104 U/L (ref 46–116)
ALT SERPL-CCNC: 37 U/L (ref 10–68)
ANION GAP SERPL CALC-SCNC: 12.2 MMOL/L (ref 8–16)
APPEARANCE UR: (no result)
APTT BLD: 23.3 SECONDS (ref 22.8–39.4)
BACTERIA #/AREA URNS HPF: (no result) /HPF
BASOPHILS NFR BLD AUTO: 0.2 % (ref 0–2)
BILIRUB SERPL-MCNC: 0.17 MG/DL (ref 0.2–1.3)
BILIRUB SERPL-MCNC: NEGATIVE MG/DL
BUN SERPL-MCNC: 16 MG/DL (ref 7–18)
CA TITR SERPL AGGL: NEGATIVE {TITER}
CALCIUM SERPL-MCNC: 8.9 MG/DL (ref 8.5–10.1)
CHLORIDE SERPL-SCNC: 97 MMOL/L (ref 98–107)
CO2 SERPL-SCNC: 29.2 MMOL/L (ref 21–32)
COLD SCREEN ROOM TEMP: NEGATIVE
COLOR UR: YELLOW
CREAT SERPL-MCNC: 0.9 MG/DL (ref 0.6–1.3)
EOSINOPHIL NFR BLD: 2.1 % (ref 0–7)
ERYTHROCYTE [DISTWIDTH] IN BLOOD BY AUTOMATED COUNT: 13.1 % (ref 11.5–14.5)
EST. AVERAGE GLUCOSE BLD GHB EST-MCNC: 260 MG/DL (ref 74–154)
GLOBULIN SER-MCNC: 4.1 G/L
GLUCOSE SERPL-MCNC: 1000 MG/DL
GLUCOSE SERPL-MCNC: 416 MG/DL (ref 74–106)
HBA1C MFR BLD: 10.7 % (ref 4.8–6)
HCT VFR BLD CALC: 38.5 % (ref 36–48)
HGB BLD-MCNC: 12.7 G/DL (ref 12–16)
IMM GRANULOCYTES NFR BLD: 0.2 % (ref 0–5)
INR PPP: 0.9 (ref 0.85–1.17)
KETONES UR STRIP-MCNC: (no result) MG/DL
LYMPHOCYTES NFR BLD AUTO: 26.3 % (ref 15–50)
MCH RBC QN AUTO: 29.8 PG (ref 26–34)
MCHC RBC AUTO-ENTMCNC: 33 G/DL (ref 31–37)
MCV RBC: 90.4 FL (ref 80–100)
MONOCYTES NFR BLD: 5.9 % (ref 2–11)
MUCOUS THREADS #/AREA URNS LPF: (no result) /LPF
NEUTROPHILS NFR BLD AUTO: 65.3 % (ref 40–80)
NITRITE UR-MCNC: POSITIVE MG/ML
OSMOLALITY SERPL CALC.SUM OF ELEC: 286 MOSM/KG (ref 275–300)
PH UR STRIP: 5 [PH] (ref 5–6)
PHOSPHATE SERPL-MCNC: 3.2 MG/DL (ref 2.5–4.9)
PLATELET # BLD: 272 10X3/UL (ref 130–400)
PMV BLD AUTO: 10.5 FL (ref 7.4–10.4)
POTASSIUM SERPL-SCNC: 4.4 MMOL/L (ref 3.5–5.1)
PROT SERPL-MCNC: 7.6 G/DL (ref 6.4–8.2)
PROT UR-MCNC: NEGATIVE MG/DL
PROTHROMBIN TIME: 12 SECONDS (ref 11.6–15)
RBC # BLD AUTO: 4.26 10X6/UL (ref 4–5.4)
RBC #/AREA URNS HPF: (no result) /HPF (ref 0–5)
SODIUM SERPL-SCNC: 134 MMOL/L (ref 136–145)
SP GR UR STRIP: 1.01 (ref 1–1.02)
SQUAMOUS #/AREA URNS HPF: (no result) /HPF (ref 0–5)
T4 FREE SERPL-MCNC: 1.1 NG/DL (ref 0.76–1.46)
TSH SERPL-ACNC: 2.37 UIU/ML (ref 0.36–3.74)
URATE SERPL-MCNC: 3.9 MG/DL (ref 2.6–7.2)
UROBILINOGEN UR-MCNC: NORMAL MG/DL
WBC # BLD AUTO: 5.6 10X3/UL (ref 4.8–10.8)
WBC #/AREA URNS HPF: (no result) /HPF (ref 0–5)

## 2017-11-17 ENCOUNTER — HOSPITAL ENCOUNTER (INPATIENT)
Dept: HOSPITAL 84 - D.CVICU | Age: 52
LOS: 19 days | Discharge: NURSING, REHAB OR LONG-TERM CARE | DRG: 235 | End: 2017-12-06
Attending: THORACIC SURGERY (CARDIOTHORACIC VASCULAR SURGERY) | Admitting: THORACIC SURGERY (CARDIOTHORACIC VASCULAR SURGERY)
Payer: COMMERCIAL

## 2017-11-17 VITALS — SYSTOLIC BLOOD PRESSURE: 112 MMHG | DIASTOLIC BLOOD PRESSURE: 65 MMHG

## 2017-11-17 VITALS — SYSTOLIC BLOOD PRESSURE: 130 MMHG | DIASTOLIC BLOOD PRESSURE: 64 MMHG

## 2017-11-17 VITALS — DIASTOLIC BLOOD PRESSURE: 54 MMHG | SYSTOLIC BLOOD PRESSURE: 108 MMHG

## 2017-11-17 VITALS — DIASTOLIC BLOOD PRESSURE: 64 MMHG | SYSTOLIC BLOOD PRESSURE: 118 MMHG

## 2017-11-17 VITALS — DIASTOLIC BLOOD PRESSURE: 72 MMHG | SYSTOLIC BLOOD PRESSURE: 140 MMHG

## 2017-11-17 VITALS — SYSTOLIC BLOOD PRESSURE: 143 MMHG | DIASTOLIC BLOOD PRESSURE: 67 MMHG

## 2017-11-17 VITALS — SYSTOLIC BLOOD PRESSURE: 121 MMHG | DIASTOLIC BLOOD PRESSURE: 68 MMHG

## 2017-11-17 VITALS
WEIGHT: 215.35 LBS | BODY MASS INDEX: 34.2 KG/M2 | BODY MASS INDEX: 34.2 KG/M2 | BODY MASS INDEX: 34.2 KG/M2 | HEIGHT: 66.5 IN | BODY MASS INDEX: 34.2 KG/M2 | BODY MASS INDEX: 34.2 KG/M2 | BODY MASS INDEX: 34.2 KG/M2 | HEIGHT: 66.5 IN | WEIGHT: 215.35 LBS

## 2017-11-17 VITALS — DIASTOLIC BLOOD PRESSURE: 67 MMHG | SYSTOLIC BLOOD PRESSURE: 143 MMHG

## 2017-11-17 VITALS — SYSTOLIC BLOOD PRESSURE: 112 MMHG | DIASTOLIC BLOOD PRESSURE: 62 MMHG

## 2017-11-17 VITALS — SYSTOLIC BLOOD PRESSURE: 117 MMHG | DIASTOLIC BLOOD PRESSURE: 66 MMHG

## 2017-11-17 VITALS — SYSTOLIC BLOOD PRESSURE: 128 MMHG | DIASTOLIC BLOOD PRESSURE: 69 MMHG

## 2017-11-17 VITALS — SYSTOLIC BLOOD PRESSURE: 123 MMHG | DIASTOLIC BLOOD PRESSURE: 65 MMHG

## 2017-11-17 VITALS — SYSTOLIC BLOOD PRESSURE: 137 MMHG | DIASTOLIC BLOOD PRESSURE: 74 MMHG

## 2017-11-17 VITALS — SYSTOLIC BLOOD PRESSURE: 115 MMHG | DIASTOLIC BLOOD PRESSURE: 58 MMHG

## 2017-11-17 VITALS — DIASTOLIC BLOOD PRESSURE: 67 MMHG | SYSTOLIC BLOOD PRESSURE: 141 MMHG

## 2017-11-17 VITALS — SYSTOLIC BLOOD PRESSURE: 141 MMHG | DIASTOLIC BLOOD PRESSURE: 66 MMHG

## 2017-11-17 VITALS — DIASTOLIC BLOOD PRESSURE: 62 MMHG | SYSTOLIC BLOOD PRESSURE: 125 MMHG

## 2017-11-17 VITALS — DIASTOLIC BLOOD PRESSURE: 61 MMHG | SYSTOLIC BLOOD PRESSURE: 112 MMHG

## 2017-11-17 VITALS — SYSTOLIC BLOOD PRESSURE: 122 MMHG | DIASTOLIC BLOOD PRESSURE: 64 MMHG

## 2017-11-17 VITALS — SYSTOLIC BLOOD PRESSURE: 133 MMHG | DIASTOLIC BLOOD PRESSURE: 66 MMHG

## 2017-11-17 VITALS — SYSTOLIC BLOOD PRESSURE: 110 MMHG | DIASTOLIC BLOOD PRESSURE: 62 MMHG

## 2017-11-17 VITALS — SYSTOLIC BLOOD PRESSURE: 130 MMHG | DIASTOLIC BLOOD PRESSURE: 73 MMHG

## 2017-11-17 VITALS — DIASTOLIC BLOOD PRESSURE: 54 MMHG | SYSTOLIC BLOOD PRESSURE: 95 MMHG

## 2017-11-17 VITALS — DIASTOLIC BLOOD PRESSURE: 79 MMHG | SYSTOLIC BLOOD PRESSURE: 146 MMHG

## 2017-11-17 VITALS — SYSTOLIC BLOOD PRESSURE: 122 MMHG | DIASTOLIC BLOOD PRESSURE: 68 MMHG

## 2017-11-17 VITALS — DIASTOLIC BLOOD PRESSURE: 67 MMHG | SYSTOLIC BLOOD PRESSURE: 139 MMHG

## 2017-11-17 VITALS — SYSTOLIC BLOOD PRESSURE: 128 MMHG | DIASTOLIC BLOOD PRESSURE: 64 MMHG

## 2017-11-17 VITALS — SYSTOLIC BLOOD PRESSURE: 126 MMHG | DIASTOLIC BLOOD PRESSURE: 68 MMHG

## 2017-11-17 VITALS — DIASTOLIC BLOOD PRESSURE: 69 MMHG | SYSTOLIC BLOOD PRESSURE: 123 MMHG

## 2017-11-17 VITALS — DIASTOLIC BLOOD PRESSURE: 63 MMHG | SYSTOLIC BLOOD PRESSURE: 122 MMHG

## 2017-11-17 VITALS — DIASTOLIC BLOOD PRESSURE: 59 MMHG | SYSTOLIC BLOOD PRESSURE: 143 MMHG

## 2017-11-17 VITALS — DIASTOLIC BLOOD PRESSURE: 60 MMHG | SYSTOLIC BLOOD PRESSURE: 119 MMHG

## 2017-11-17 VITALS — DIASTOLIC BLOOD PRESSURE: 69 MMHG | SYSTOLIC BLOOD PRESSURE: 125 MMHG

## 2017-11-17 VITALS — DIASTOLIC BLOOD PRESSURE: 69 MMHG | SYSTOLIC BLOOD PRESSURE: 137 MMHG

## 2017-11-17 VITALS — SYSTOLIC BLOOD PRESSURE: 118 MMHG | DIASTOLIC BLOOD PRESSURE: 66 MMHG

## 2017-11-17 DIAGNOSIS — I47.1: ICD-10-CM

## 2017-11-17 DIAGNOSIS — E11.9: ICD-10-CM

## 2017-11-17 DIAGNOSIS — K21.9: ICD-10-CM

## 2017-11-17 DIAGNOSIS — J98.11: ICD-10-CM

## 2017-11-17 DIAGNOSIS — E87.1: ICD-10-CM

## 2017-11-17 DIAGNOSIS — M35.00: ICD-10-CM

## 2017-11-17 DIAGNOSIS — K56.7: ICD-10-CM

## 2017-11-17 DIAGNOSIS — E83.39: ICD-10-CM

## 2017-11-17 DIAGNOSIS — I44.2: ICD-10-CM

## 2017-11-17 DIAGNOSIS — D64.9: ICD-10-CM

## 2017-11-17 DIAGNOSIS — N99.0: ICD-10-CM

## 2017-11-17 DIAGNOSIS — J96.20: ICD-10-CM

## 2017-11-17 DIAGNOSIS — G40.909: ICD-10-CM

## 2017-11-17 DIAGNOSIS — I97.0: ICD-10-CM

## 2017-11-17 DIAGNOSIS — N39.0: ICD-10-CM

## 2017-11-17 DIAGNOSIS — I10: ICD-10-CM

## 2017-11-17 DIAGNOSIS — B96.20: ICD-10-CM

## 2017-11-17 DIAGNOSIS — R65.10: ICD-10-CM

## 2017-11-17 DIAGNOSIS — M32.9: ICD-10-CM

## 2017-11-17 DIAGNOSIS — I25.119: Primary | ICD-10-CM

## 2017-11-17 DIAGNOSIS — R50.82: ICD-10-CM

## 2017-11-17 DIAGNOSIS — N17.0: ICD-10-CM

## 2017-11-17 LAB
ANION GAP SERPL CALC-SCNC: 14.3 MMOL/L (ref 8–16)
APTT BLD: 26.1 SECONDS (ref 22.8–39.4)
BUN SERPL-MCNC: 14 MG/DL (ref 7–18)
CALCIUM SERPL-MCNC: 7.8 MG/DL (ref 8.5–10.1)
CHLORIDE SERPL-SCNC: 109 MMOL/L (ref 98–107)
CO2 SERPL-SCNC: 26 MMOL/L (ref 21–32)
CREAT SERPL-MCNC: 0.4 MG/DL (ref 0.6–1.3)
ERYTHROCYTE [DISTWIDTH] IN BLOOD BY AUTOMATED COUNT: 13.1 % (ref 11.5–14.5)
GLUCOSE SERPL-MCNC: 148 MG/DL (ref 74–106)
HCT VFR BLD CALC: 30 % (ref 36–48)
HGB BLD-MCNC: 10.6 G/DL (ref 12–16)
INR PPP: 1.23 (ref 0.85–1.17)
MCH RBC QN AUTO: 30.9 PG (ref 26–34)
MCHC RBC AUTO-ENTMCNC: 35.3 G/DL (ref 31–37)
MCV RBC: 87.5 FL (ref 80–100)
OSMOLALITY SERPL CALC.SUM OF ELEC: 294 MOSM/KG (ref 275–300)
PA ADP PRP-ACNC: 300 PRU (ref 194–418)
PLATELET # BLD: 182 10X3/UL (ref 130–400)
PMV BLD AUTO: 10.9 FL (ref 7.4–10.4)
POTASSIUM SERPL-SCNC: 3.3 MMOL/L (ref 3.5–5.1)
PROTHROMBIN TIME: 15.4 SECONDS (ref 11.6–15)
RBC # BLD AUTO: 3.43 10X6/UL (ref 4–5.4)
SODIUM SERPL-SCNC: 146 MMOL/L (ref 136–145)
WBC # BLD AUTO: 13.2 10X3/UL (ref 4.8–10.8)

## 2017-11-17 PROCEDURE — 02110AC BYPASS CORONARY ARTERY, TWO ARTERIES FROM THORACIC ARTERY WITH AUTOLOGOUS ARTERIAL TISSUE, OPEN APPROACH: ICD-10-PCS | Performed by: THORACIC SURGERY (CARDIOTHORACIC VASCULAR SURGERY)

## 2017-11-17 PROCEDURE — B245ZZ4 ULTRASONOGRAPHY OF LEFT HEART, TRANSESOPHAGEAL: ICD-10-PCS | Performed by: INTERNAL MEDICINE

## 2017-11-17 PROCEDURE — 5A1221Z PERFORMANCE OF CARDIAC OUTPUT, CONTINUOUS: ICD-10-PCS | Performed by: THORACIC SURGERY (CARDIOTHORACIC VASCULAR SURGERY)

## 2017-11-17 PROCEDURE — 021109W BYPASS CORONARY ARTERY, TWO ARTERIES FROM AORTA WITH AUTOLOGOUS VENOUS TISSUE, OPEN APPROACH: ICD-10-PCS | Performed by: THORACIC SURGERY (CARDIOTHORACIC VASCULAR SURGERY)

## 2017-11-17 PROCEDURE — 06BP0ZZ EXCISION OF RIGHT SAPHENOUS VEIN, OPEN APPROACH: ICD-10-PCS | Performed by: THORACIC SURGERY (CARDIOTHORACIC VASCULAR SURGERY)

## 2017-11-17 PROCEDURE — 5A1223Z PERFORMANCE OF CARDIAC PACING, CONTINUOUS: ICD-10-PCS | Performed by: THORACIC SURGERY (CARDIOTHORACIC VASCULAR SURGERY)

## 2017-11-17 NOTE — NUR
Dr Rice has been paged to alert to consult. Awaiting return call. Dr Quinteros notified of consult since on call for Dr Jo.

## 2017-11-17 NOTE — NUR
Pt arrived to room via bed from O.R. Ventilated with 8.0 ETT, 22cm lip.
RIJ hieu, LSUB double lumen, Right radial ART line. Right leg harvest sites.
WILFRIDO drain x2, bloody drainage. Chest tubes x3, bloody drainage. Temp pacer DDD
100, 10,15. Lower extremity pulses palpable, but weak. Right leg wrapped. Left
leg with Zana and SCD.

## 2017-11-17 NOTE — NUR
REPORT RECVD. CARE ASSUMED. INITIAL ASSMNT COMPLETED. SEE FLOWSHEET FOR ALL
FINDINGS. AWAKE, FOLLOWING COMMANDS ON MECH VENT. CPAP SETTINGS FOR WEANING
PROTOCOL. PERRLA NOTED. RESP EVEN AND UNLABORED. LUNGS SOUNDS CTA, DIM IN
BASES. SPO2 97%. ST WITH V PACING ON THE MONITOR ATRIAL SENSING VIA TEMP PM AT
DDD 90. PULSES PALP. RIGHT RADIAL A-LINE INTACT WITH GOOD WAVE FORM. SYS B/P
WITHIN PARAMETERS. CLEVIPREX GTT TITRATION IN USE. RIGHT IJ SWAN LOCKED,
PATENT AND SECURED. CARDIAC MONITORING PER VIGILENCE WITHIN PARAMETERS.
AFEBRILE. TEDS/SCDS IN USE. CHEST TUBES X3 PATENT AND SECURED TO 20 SM SX.
MINIMAL BLOODY DRNG SEEN. ABD SOFT. BS HYPO X4. OGT TO LIWS.
F/C PATENT WITH DARK UOP. 1:1 NURSE MONITORING IN PLACE. CONT CURRENT POC.

## 2017-11-17 NOTE — NUR
REASSESSMENT COMPLETED. SEE FLOWSHEET FOR ALL FINDINGS.
RESTING WITH NO DISTRESS. REPOSITIONED FOR COMFORT AND SKIN INTEGRITY.
PERRLA NOTED. RESP EVEN AND UNLABORED. LUNGS SOUNDS CTA, DIM IN
BASES. SPO2 97%. ST WITH V PACING ON THE MONITOR ATRIAL SENSING VIA TEMP PM A
DDD 90. PULSES PALP. RIGHT RADIAL A-LINE INTACT WITH GOOD WAVE FORM. SYS B/P
WITHIN PARAMETERS. CLEVIPREX GTT TITRATION IN USE. RIGHT IJ SWAN LOCKED,
PATENT AND SECURED. CARDIAC MONITORING PER VIGILENCE WITHIN PARAMETERS.
AFEBRILE. TEDS/SCDS IN USE. CHEST TUBES X3 PATENT AND SECURED TO 20 SM SX.
MINIMAL BLOODY DRNG SEEN. ABD SOFT. BS HYPO X4. OGT TO LIWS.
F/C PATENT WITH DARK UOP. 1:[address] NUR[address] PLACE. CONT CURRENT POC.

## 2017-11-18 VITALS — DIASTOLIC BLOOD PRESSURE: 56 MMHG | SYSTOLIC BLOOD PRESSURE: 129 MMHG

## 2017-11-18 VITALS — SYSTOLIC BLOOD PRESSURE: 139 MMHG | DIASTOLIC BLOOD PRESSURE: 61 MMHG

## 2017-11-18 VITALS — SYSTOLIC BLOOD PRESSURE: 125 MMHG | DIASTOLIC BLOOD PRESSURE: 57 MMHG

## 2017-11-18 VITALS — DIASTOLIC BLOOD PRESSURE: 76 MMHG | SYSTOLIC BLOOD PRESSURE: 136 MMHG

## 2017-11-18 VITALS — DIASTOLIC BLOOD PRESSURE: 54 MMHG | SYSTOLIC BLOOD PRESSURE: 117 MMHG

## 2017-11-18 VITALS — SYSTOLIC BLOOD PRESSURE: 133 MMHG | DIASTOLIC BLOOD PRESSURE: 62 MMHG

## 2017-11-18 VITALS — DIASTOLIC BLOOD PRESSURE: 57 MMHG | SYSTOLIC BLOOD PRESSURE: 126 MMHG

## 2017-11-18 VITALS — DIASTOLIC BLOOD PRESSURE: 67 MMHG | SYSTOLIC BLOOD PRESSURE: 114 MMHG

## 2017-11-18 VITALS — SYSTOLIC BLOOD PRESSURE: 114 MMHG | DIASTOLIC BLOOD PRESSURE: 62 MMHG

## 2017-11-18 VITALS — DIASTOLIC BLOOD PRESSURE: 58 MMHG | SYSTOLIC BLOOD PRESSURE: 128 MMHG

## 2017-11-18 VITALS — SYSTOLIC BLOOD PRESSURE: 121 MMHG | DIASTOLIC BLOOD PRESSURE: 56 MMHG

## 2017-11-18 VITALS — SYSTOLIC BLOOD PRESSURE: 114 MMHG | DIASTOLIC BLOOD PRESSURE: 68 MMHG

## 2017-11-18 VITALS — DIASTOLIC BLOOD PRESSURE: 60 MMHG | SYSTOLIC BLOOD PRESSURE: 124 MMHG

## 2017-11-18 VITALS — SYSTOLIC BLOOD PRESSURE: 110 MMHG | DIASTOLIC BLOOD PRESSURE: 54 MMHG

## 2017-11-18 VITALS — SYSTOLIC BLOOD PRESSURE: 143 MMHG | DIASTOLIC BLOOD PRESSURE: 67 MMHG

## 2017-11-18 VITALS — SYSTOLIC BLOOD PRESSURE: 143 MMHG | DIASTOLIC BLOOD PRESSURE: 71 MMHG

## 2017-11-18 VITALS — SYSTOLIC BLOOD PRESSURE: 125 MMHG | DIASTOLIC BLOOD PRESSURE: 58 MMHG

## 2017-11-18 VITALS — DIASTOLIC BLOOD PRESSURE: 60 MMHG | SYSTOLIC BLOOD PRESSURE: 104 MMHG

## 2017-11-18 VITALS — DIASTOLIC BLOOD PRESSURE: 61 MMHG | SYSTOLIC BLOOD PRESSURE: 124 MMHG

## 2017-11-18 VITALS — SYSTOLIC BLOOD PRESSURE: 140 MMHG | DIASTOLIC BLOOD PRESSURE: 67 MMHG

## 2017-11-18 VITALS — DIASTOLIC BLOOD PRESSURE: 56 MMHG | SYSTOLIC BLOOD PRESSURE: 112 MMHG

## 2017-11-18 VITALS — DIASTOLIC BLOOD PRESSURE: 72 MMHG | SYSTOLIC BLOOD PRESSURE: 135 MMHG

## 2017-11-18 VITALS — SYSTOLIC BLOOD PRESSURE: 112 MMHG | DIASTOLIC BLOOD PRESSURE: 60 MMHG

## 2017-11-18 VITALS — SYSTOLIC BLOOD PRESSURE: 120 MMHG | DIASTOLIC BLOOD PRESSURE: 54 MMHG

## 2017-11-18 VITALS — SYSTOLIC BLOOD PRESSURE: 100 MMHG | DIASTOLIC BLOOD PRESSURE: 67 MMHG

## 2017-11-18 VITALS — DIASTOLIC BLOOD PRESSURE: 55 MMHG | SYSTOLIC BLOOD PRESSURE: 109 MMHG

## 2017-11-18 VITALS — SYSTOLIC BLOOD PRESSURE: 149 MMHG | DIASTOLIC BLOOD PRESSURE: 63 MMHG

## 2017-11-18 VITALS — SYSTOLIC BLOOD PRESSURE: 121 MMHG | DIASTOLIC BLOOD PRESSURE: 54 MMHG

## 2017-11-18 VITALS — DIASTOLIC BLOOD PRESSURE: 64 MMHG | SYSTOLIC BLOOD PRESSURE: 105 MMHG

## 2017-11-18 VITALS — DIASTOLIC BLOOD PRESSURE: 67 MMHG | SYSTOLIC BLOOD PRESSURE: 143 MMHG

## 2017-11-18 VITALS — DIASTOLIC BLOOD PRESSURE: 64 MMHG | SYSTOLIC BLOOD PRESSURE: 112 MMHG

## 2017-11-18 VITALS — SYSTOLIC BLOOD PRESSURE: 122 MMHG | DIASTOLIC BLOOD PRESSURE: 67 MMHG

## 2017-11-18 VITALS — DIASTOLIC BLOOD PRESSURE: 60 MMHG | SYSTOLIC BLOOD PRESSURE: 123 MMHG

## 2017-11-18 VITALS — SYSTOLIC BLOOD PRESSURE: 114 MMHG | DIASTOLIC BLOOD PRESSURE: 56 MMHG

## 2017-11-18 VITALS — SYSTOLIC BLOOD PRESSURE: 108 MMHG | DIASTOLIC BLOOD PRESSURE: 65 MMHG

## 2017-11-18 VITALS — SYSTOLIC BLOOD PRESSURE: 143 MMHG | DIASTOLIC BLOOD PRESSURE: 75 MMHG

## 2017-11-18 VITALS — DIASTOLIC BLOOD PRESSURE: 70 MMHG | SYSTOLIC BLOOD PRESSURE: 123 MMHG

## 2017-11-18 VITALS — DIASTOLIC BLOOD PRESSURE: 65 MMHG | SYSTOLIC BLOOD PRESSURE: 111 MMHG

## 2017-11-18 VITALS — SYSTOLIC BLOOD PRESSURE: 117 MMHG | DIASTOLIC BLOOD PRESSURE: 55 MMHG

## 2017-11-18 VITALS — DIASTOLIC BLOOD PRESSURE: 62 MMHG | SYSTOLIC BLOOD PRESSURE: 114 MMHG

## 2017-11-18 VITALS — SYSTOLIC BLOOD PRESSURE: 118 MMHG | DIASTOLIC BLOOD PRESSURE: 62 MMHG

## 2017-11-18 VITALS — DIASTOLIC BLOOD PRESSURE: 55 MMHG | SYSTOLIC BLOOD PRESSURE: 123 MMHG

## 2017-11-18 VITALS — SYSTOLIC BLOOD PRESSURE: 148 MMHG | DIASTOLIC BLOOD PRESSURE: 62 MMHG

## 2017-11-18 VITALS — SYSTOLIC BLOOD PRESSURE: 107 MMHG | DIASTOLIC BLOOD PRESSURE: 63 MMHG

## 2017-11-18 VITALS — DIASTOLIC BLOOD PRESSURE: 61 MMHG | SYSTOLIC BLOOD PRESSURE: 139 MMHG

## 2017-11-18 VITALS — SYSTOLIC BLOOD PRESSURE: 102 MMHG | DIASTOLIC BLOOD PRESSURE: 61 MMHG

## 2017-11-18 VITALS — SYSTOLIC BLOOD PRESSURE: 121 MMHG | DIASTOLIC BLOOD PRESSURE: 55 MMHG

## 2017-11-18 VITALS — DIASTOLIC BLOOD PRESSURE: 56 MMHG | SYSTOLIC BLOOD PRESSURE: 126 MMHG

## 2017-11-18 VITALS — DIASTOLIC BLOOD PRESSURE: 65 MMHG | SYSTOLIC BLOOD PRESSURE: 103 MMHG

## 2017-11-18 VITALS — SYSTOLIC BLOOD PRESSURE: 112 MMHG | DIASTOLIC BLOOD PRESSURE: 67 MMHG

## 2017-11-18 VITALS — DIASTOLIC BLOOD PRESSURE: 61 MMHG | SYSTOLIC BLOOD PRESSURE: 133 MMHG

## 2017-11-18 VITALS — DIASTOLIC BLOOD PRESSURE: 55 MMHG | SYSTOLIC BLOOD PRESSURE: 118 MMHG

## 2017-11-18 VITALS — SYSTOLIC BLOOD PRESSURE: 126 MMHG | DIASTOLIC BLOOD PRESSURE: 62 MMHG

## 2017-11-18 VITALS — SYSTOLIC BLOOD PRESSURE: 120 MMHG | DIASTOLIC BLOOD PRESSURE: 68 MMHG

## 2017-11-18 VITALS — DIASTOLIC BLOOD PRESSURE: 71 MMHG | SYSTOLIC BLOOD PRESSURE: 140 MMHG

## 2017-11-18 VITALS — SYSTOLIC BLOOD PRESSURE: 116 MMHG | DIASTOLIC BLOOD PRESSURE: 68 MMHG

## 2017-11-18 VITALS — DIASTOLIC BLOOD PRESSURE: 61 MMHG | SYSTOLIC BLOOD PRESSURE: 115 MMHG

## 2017-11-18 VITALS — DIASTOLIC BLOOD PRESSURE: 55 MMHG | SYSTOLIC BLOOD PRESSURE: 128 MMHG

## 2017-11-18 VITALS — SYSTOLIC BLOOD PRESSURE: 119 MMHG | DIASTOLIC BLOOD PRESSURE: 70 MMHG

## 2017-11-18 VITALS — SYSTOLIC BLOOD PRESSURE: 124 MMHG | DIASTOLIC BLOOD PRESSURE: 58 MMHG

## 2017-11-18 VITALS — DIASTOLIC BLOOD PRESSURE: 66 MMHG | SYSTOLIC BLOOD PRESSURE: 129 MMHG

## 2017-11-18 VITALS — DIASTOLIC BLOOD PRESSURE: 60 MMHG | SYSTOLIC BLOOD PRESSURE: 100 MMHG

## 2017-11-18 VITALS — SYSTOLIC BLOOD PRESSURE: 126 MMHG | DIASTOLIC BLOOD PRESSURE: 58 MMHG

## 2017-11-18 VITALS — SYSTOLIC BLOOD PRESSURE: 128 MMHG | DIASTOLIC BLOOD PRESSURE: 60 MMHG

## 2017-11-18 VITALS — SYSTOLIC BLOOD PRESSURE: 118 MMHG | DIASTOLIC BLOOD PRESSURE: 72 MMHG

## 2017-11-18 VITALS — SYSTOLIC BLOOD PRESSURE: 124 MMHG | DIASTOLIC BLOOD PRESSURE: 57 MMHG

## 2017-11-18 VITALS — DIASTOLIC BLOOD PRESSURE: 64 MMHG | SYSTOLIC BLOOD PRESSURE: 108 MMHG

## 2017-11-18 VITALS — SYSTOLIC BLOOD PRESSURE: 123 MMHG | DIASTOLIC BLOOD PRESSURE: 67 MMHG

## 2017-11-18 VITALS — DIASTOLIC BLOOD PRESSURE: 60 MMHG | SYSTOLIC BLOOD PRESSURE: 135 MMHG

## 2017-11-18 VITALS — SYSTOLIC BLOOD PRESSURE: 131 MMHG | DIASTOLIC BLOOD PRESSURE: 55 MMHG

## 2017-11-18 VITALS — DIASTOLIC BLOOD PRESSURE: 63 MMHG | SYSTOLIC BLOOD PRESSURE: 122 MMHG

## 2017-11-18 VITALS — DIASTOLIC BLOOD PRESSURE: 70 MMHG | SYSTOLIC BLOOD PRESSURE: 134 MMHG

## 2017-11-18 VITALS — DIASTOLIC BLOOD PRESSURE: 64 MMHG | SYSTOLIC BLOOD PRESSURE: 98 MMHG

## 2017-11-18 VITALS — SYSTOLIC BLOOD PRESSURE: 116 MMHG | DIASTOLIC BLOOD PRESSURE: 54 MMHG

## 2017-11-18 VITALS — SYSTOLIC BLOOD PRESSURE: 100 MMHG | DIASTOLIC BLOOD PRESSURE: 61 MMHG

## 2017-11-18 VITALS — DIASTOLIC BLOOD PRESSURE: 69 MMHG | SYSTOLIC BLOOD PRESSURE: 116 MMHG

## 2017-11-18 LAB
ALBUMIN SERPL-MCNC: 3.8 G/DL (ref 3.4–5)
ALP SERPL-CCNC: 67 U/L (ref 46–116)
ALT SERPL-CCNC: 100 U/L (ref 10–68)
ANION GAP SERPL CALC-SCNC: 14.6 MMOL/L (ref 8–16)
ANION GAP SERPL CALC-SCNC: 16.3 MMOL/L (ref 8–16)
ANION GAP SERPL CALC-SCNC: 17.9 MMOL/L (ref 8–16)
APPEARANCE UR: (no result)
BACTERIA #/AREA URNS HPF: (no result) /HPF
BILIRUB SERPL-MCNC: 0.93 MG/DL (ref 0.2–1.3)
BILIRUB SERPL-MCNC: NEGATIVE MG/DL
BUN SERPL-MCNC: 21 MG/DL (ref 7–18)
BUN SERPL-MCNC: 26 MG/DL (ref 7–18)
BUN SERPL-MCNC: 30 MG/DL (ref 7–18)
CALCIUM SERPL-MCNC: 8.2 MG/DL (ref 8.5–10.1)
CALCIUM SERPL-MCNC: 8.4 MG/DL (ref 8.5–10.1)
CALCIUM SERPL-MCNC: 8.6 MG/DL (ref 8.5–10.1)
CHLORIDE SERPL-SCNC: 107 MMOL/L (ref 98–107)
CHLORIDE SERPL-SCNC: 109 MMOL/L (ref 98–107)
CHLORIDE SERPL-SCNC: 111 MMOL/L (ref 98–107)
CO2 SERPL-SCNC: 22.5 MMOL/L (ref 21–32)
CO2 SERPL-SCNC: 25.5 MMOL/L (ref 21–32)
CO2 SERPL-SCNC: 25.5 MMOL/L (ref 21–32)
COLOR UR: (no result)
CREAT SERPL-MCNC: 0.8 MG/DL (ref 0.6–1.3)
CREAT SERPL-MCNC: 0.9 MG/DL (ref 0.6–1.3)
CREAT SERPL-MCNC: 1 MG/DL (ref 0.6–1.3)
ERYTHROCYTE [DISTWIDTH] IN BLOOD BY AUTOMATED COUNT: 13.7 % (ref 11.5–14.5)
ERYTHROCYTE [DISTWIDTH] IN BLOOD BY AUTOMATED COUNT: 13.8 % (ref 11.5–14.5)
ERYTHROCYTE [DISTWIDTH] IN BLOOD BY AUTOMATED COUNT: 14.1 % (ref 11.5–14.5)
ERYTHROCYTE [SEDIMENTATION RATE] IN BLOOD: 21 MM/HR (ref 0–30)
GLOBULIN SER-MCNC: 2.6 G/L
GLUCOSE SERPL-MCNC: 117 MG/DL (ref 74–106)
GLUCOSE SERPL-MCNC: 124 MG/DL (ref 74–106)
GLUCOSE SERPL-MCNC: 232 MG/DL (ref 74–106)
GLUCOSE SERPL-MCNC: NEGATIVE MG/DL
HCT VFR BLD CALC: 26 % (ref 36–48)
HCT VFR BLD CALC: 26.1 % (ref 36–48)
HCT VFR BLD CALC: 34 % (ref 36–48)
HGB BLD-MCNC: 11.5 G/DL (ref 12–16)
HGB BLD-MCNC: 8.4 G/DL (ref 12–16)
HGB BLD-MCNC: 8.7 G/DL (ref 12–16)
HYALINE CASTS #/AREA URNS LPF: (no result) /LPF
KETONES UR STRIP-MCNC: NEGATIVE MG/DL
LYMPHOCYTES NFR BLD AUTO: 42 % (ref 15–50)
MCH RBC QN AUTO: 29.5 PG (ref 26–34)
MCH RBC QN AUTO: 30.2 PG (ref 26–34)
MCH RBC QN AUTO: 30.4 PG (ref 26–34)
MCHC RBC AUTO-ENTMCNC: 32.3 G/DL (ref 31–37)
MCHC RBC AUTO-ENTMCNC: 33.3 G/DL (ref 31–37)
MCHC RBC AUTO-ENTMCNC: 33.8 G/DL (ref 31–37)
MCV RBC: 89.9 FL (ref 80–100)
MCV RBC: 90.6 FL (ref 80–100)
MCV RBC: 91.2 FL (ref 80–100)
MONOCYTES NFR BLD: 19 % (ref 2–11)
NEUTROPHILS NFR BLD AUTO: 29 % (ref 40–80)
NITRITE UR-MCNC: NEGATIVE MG/ML
OSMOLALITY SERPL CALC.SUM OF ELEC: 294 MOSM/KG (ref 275–300)
OSMOLALITY SERPL CALC.SUM OF ELEC: 297 MOSM/KG (ref 275–300)
OSMOLALITY SERPL CALC.SUM OF ELEC: 297 MOSM/KG (ref 275–300)
PH UR STRIP: 5 [PH] (ref 5–6)
PLATELET # BLD EST: NORMAL 10*3/UL
PLATELET # BLD: 120 10X3/UL (ref 130–400)
PLATELET # BLD: 125 10X3/UL (ref 130–400)
PLATELET # BLD: 187 10X3/UL (ref 130–400)
PMV BLD AUTO: 10.6 FL (ref 7.4–10.4)
PMV BLD AUTO: 10.9 FL (ref 7.4–10.4)
PMV BLD AUTO: 11 FL (ref 7.4–10.4)
POIKILOCYTOSIS BLD QL SMEAR: (no result)
POTASSIUM SERPL-SCNC: 4.1 MMOL/L (ref 3.5–5.1)
POTASSIUM SERPL-SCNC: 4.4 MMOL/L (ref 3.5–5.1)
POTASSIUM SERPL-SCNC: 4.8 MMOL/L (ref 3.5–5.1)
PROT SERPL-MCNC: 6.4 G/DL (ref 6.4–8.2)
PROT UR-MCNC: (no result) MG/DL
RBC # BLD AUTO: 2.85 10X6/UL (ref 4–5.4)
RBC # BLD AUTO: 2.88 10X6/UL (ref 4–5.4)
RBC # BLD AUTO: 3.78 10X6/UL (ref 4–5.4)
RBC #/AREA URNS HPF: (no result) /HPF (ref 0–5)
SODIUM SERPL-SCNC: 143 MMOL/L (ref 136–145)
SODIUM SERPL-SCNC: 146 MMOL/L (ref 136–145)
SODIUM SERPL-SCNC: 147 MMOL/L (ref 136–145)
SP GR UR STRIP: 1.02 (ref 1–1.02)
SQUAMOUS #/AREA URNS HPF: (no result) /HPF (ref 0–5)
UROBILINOGEN UR-MCNC: 8 MG/DL
WBC # BLD AUTO: 5.1 10X3/UL (ref 4.8–10.8)
WBC # BLD AUTO: 6.1 10X3/UL (ref 4.8–10.8)
WBC # BLD AUTO: 7.5 10X3/UL (ref 4.8–10.8)
WBC #/AREA URNS HPF: (no result) /HPF (ref 0–5)

## 2017-11-18 NOTE — NUR
2200: D5 1/4NS IVF changed to 1/4NS at this time at 125 cc/hr x8 hours and
then to reduce to 100 cc/hr as per verbal report.

## 2017-11-18 NOTE — NUR
1905: Distal sternal incision (epigastric) changed at this time. Medistinal
drain insertion sites visualized. Sites remain CDI with no s/s of bleeding,
oozing, or swelling. Sutures visible. TPM wire insertion sites visualized and
also remain CDI with no s/s of bleeding, oozing, or swelling. TPM wires are
intact and secured. Dressing applied as per orders without diffuclty. Right
lower extrem dressings changed by 7a-7p RN. Multiple small incsisions noted
extending from right inner thigh distally to mid-calf area. All incisions
covered with betadine, 4x4's, and Hypafix tape. No bleeding, oozing, or
swelling is seen at any site.

## 2017-11-18 NOTE — NUR
2030: Pt moans with minimal stimulation. Pt has Demerol PCA in use and within
reach (10/10/240) Encouraged DBC and educated patient regarding pillow
splinting for coughing.

## 2017-11-18 NOTE — NUR
REPOSITIONED IN BED. PT IS VERY DROWASY. INCENTIVE DONE WITH POOR EFFORT. VSS.
HOB UP. CONT CURRENT POC.

## 2017-11-18 NOTE — NUR
REASSESSMENT COMPLETED. SEE FLOWSHEET FOR ALL FINDINGS.
RESTING WITH NO DISTRESS. REPOSITIONED FOR COMFORT AND SKIN INTEGRITY.
PERRLA NOTED. RESP EVEN AND UNLABORED. LUNGS SOUNDS CTA, DIM IN
BASES. SPO2 97%. ST WITH V PACING ON THE MONITOR ATRIAL SENSING VIA TEMP PM
DDD 90. PULSES PALP. RIGHT RADIAL A-LINE INTACT WITH GOOD WAVE FORM. SYS B/P
WITHIN PARAMETERS. TEMP ELEVATED. COOLING EFFORTS MADE. RIGHT IJ SWAN LOCKED,
PATENT AND SECURED. CARDIAC MONITORING PER VIGILENCE WITHIN PARAMETERS.
TEDS/SCDS IN USE. CHEST TUBES X3 PATENT AND SECURED TO 20 SM SX.
MINIMAL BLOODY DRNG SEEN. ABD SOFT. BS HYPO X4. OGT TO LIWS.
F/C PATENT WITH DARK UOP. 1:[address] N[address]. CONT CURRENT POC.

## 2017-11-18 NOTE — OP
PATIENT NAME:  DIANE KLEIN                        MEDICAL RECORD: [id number]
:61                                             LOCATION:[facility]    D.CV03
                                                         ADMISSION DATE:17
SURGEON:  WILBER NINA MD             
 
 
DATE OF OPERATION:  2017
 
SURGEON:  Wilber Nina MD
 
ANESTHESIA:  General endotracheal, Dr. Davison.
 
OPERATION PERFORMED:
1.  Coronary artery bypass utilizing the left internal thoracic, sequential
proximal left anterior descending, sequential distal left anterior descending.
2.  Reverse saphenous vein graft to the AV groove circumflex.
3.  Reverse saphenous vein graft to the ramus coronary artery.
 
PREOPERATIVE DIAGNOSES:  Angina pectoris and severe occlusive coronary artery
disease.
 
POSTOPERATIVE DIAGNOSES:  Angina pectoris and severe occlusive coronary artery
disease.
 
INDICATION FOR OPERATION:  Occlusive coronary artery disease with angina
pectoris.
 
FINDINGS OF THE OPERATION:  The left internal thoracic artery was an excellent
conduit.  The greater saphenous vein from the right leg was an adequate vessel. 
The proximal part was of good caliber and quality.  The more distal vessel was
smaller, but adequate for grafting.  The target vessels were of good quality and
caliber.  The ramus was grafted intramyocardially where it was soft.
 
ESTIMATED BLOOD LOSS:  Cell Saver was used.
 
DESCRIPTION OF PROCEDURE:  After informed consent, adequate preoperative
medication and evaluation, the patient was brought to the operating room and
placed on table in supine position.  After induction of general endotracheal
anesthesia and application of appropriate monitoring devices, the chest, neck,
abdomen, and both legs were prepped and draped in a sterile field, utilizing
Betadine scrub, alcohol, and Betadine solution.  A Betadine-impregnated drape
was also used.  Saphenous vein was harvested from the right thigh and prepared
for reverse saphenous vein grafting.  The leg was closed over drains utilizing
3-0 Vicryl and skin staples.  A median sternotomy incision was used and
dissection carried down the fascia.  Hemostasis maintained with electrocautery. 
Sternum was divided.  Innominate vein was identified and protected.  Left
internal thoracic was taken down and prepared for grafting.  The patient was
given a calculated dose of heparin, cannulated in a standard fashion utilizing
one aortic, one two-stage cannula in the atrium and inferior vena cava.  The
patient was placed on cardiopulmonary bypass, cooled to 32 degrees centigrade. 
A cross clamp was placed just proximal to the aortic cannula.  The patient was
given cardioplegic solution through the aortic root.  The patient was given a
cold induction and cold maintenance.  The patient was given cold intermittent
cardioplegic solution throughout the procedure through the root, through the
grafts or combination of both.  The first vessel to be grafted was the AV groove
circumflex, it was grafted end-to-side utilizing a running 7-0 Prolene suture. 
Grafts were measured back to the aorta and proximal anastomosis fashioned
utilizing running 6-0 Prolene suture.  Next, the ramus was grafted
 
 
 
OPERATIVE REPORT                               [id number]    LATOYADIANE CHANCE      
 
 
intramyocardially end-to-side utilizing a running 7-0 Prolene suture.  The
grafts were measured back to the aorta and proximal anastomosis fashioned
utilizing running 6-0 Prolene suture.  Next, the left internal thoracic was
brought through the hole in pericardium, sutured side-to-side to the proximal
LAD utilizing a running 8-0 Prolene suture.  Pedicle was attached to the
epicardium with 6-0 Prolene suture.  Next, the distal end of the graft was
sutured to the distal left anterior descending end-to-side utilizing a running
8-0 Prolene suture.  Pedicle was attached to epicardium with 7-0 Prolene suture.
 All maneuvers to remove trapped air were performed.  The patient was given warm
cardioplegic reperfusion and controlled reperfusion.  The patient rewarmed to 37
degrees centigrade.  Two atrial and 2 ventricular pacing wires were placed on
the heart and brought through the epigastric area.  The patient was weaned
cardiopulmonary bypass.  After being stable off bypass, he was given calculated
dose of protamine to reverse the heparin.  Hemostasis was achieved.  A #40 right
angle and #36 chest tubes were brought in through the epigastric area and placed
in mediastinum.  A separate left pleural tube was connected to underwater seal
suction.  Chest was again irrigated.  Instrument count and sponge count were
correct times 2.  Chest was closed in layers utilizing #7 wire on the sternum,
#2 Vicryl on linea alba and pectoralis fascia, subcutaneous tissue was
approximated with 3-0 Vicryl and skin approximated with 3-0 subcuticular Vicryl.
 Sterile dressings were applied.  The patient tolerated procedure well and
transferred to cardiovascular recovery in critical, but stable condition.
 
TRANSINT:[id number] Voice Confirmation ID: [id number] DOCUMENT ID: [id number]
                                           
                                           WILBER NINA MD             
 
 
 
Electronically Signed by WILBER NINA on 17 at 1037
 
 
 
 
 
 
 
 
 
 
 
 
 
 
 
 
 
CC:                                                             [id number]
DICTATION DATE: 17 1533     :     17 1641      ADM IN  
                                                                              
[facility]                                          
[address] [place] [address]

## 2017-11-18 NOTE — NUR
2230: RT in room with patient. IS done with max TV 500cc achieved multiple
times. Encouraged pt to DBC. Pt had strong non productive cough at this time.
Pt continues to grimmace and moan at times with stimulation.

## 2017-11-18 NOTE — NUR
2000: Pt Paced 104 on CM with TPM /10/10 showing A-Sense, V-Pace at
this time. Pt -130 per right radial ART line. IVF per IV/I&O flow
sheets. UOP >30 cc/hr of dark green to brown in color.

## 2017-11-18 NOTE — NUR
1230-NOTED DECREASED UO TO 5ML-DARKER URINE COLOUR-TEMP 40C BLOOD TEMP-BMP
DRAWN
1315-STATUS AND RESULTS CALLED TO DR WINTERS-ORDERS RECIEVED-ALBUMIN 25%X3
STARTED D5W 500ML OVER 1HOUR STARTED IV CHANGED TO 1/2NS AT 30ML/H -DOBUTREX
AT 5MCG/KG/MIN AS ORDERED-CONSULT PLACED FOR RENAL SERVICES AND ELECTROLYTE
[facility] MANAGEMENT
1415-DR SANTOYO AT BEDSIDE-STATUS RESULT AND CURRENT GIVEN-NO CHANGES TO
PATIENT MANAGEMENT AT THIS TIME-ADDITIONAL LAB ORDERED
1445-UPDATE TO DR CHOUDHARY GIVEN WITH INCREASED CO/CI SVO2 RESULTS-ADDITIONAL
ORDERS RECIEVED
1500-SOLUMEDROL 250MG IVPB STARTED AND SET TO RUN OVER 1H-VIA BURETROL
1515-TORADOL 30MG IVP GIVEN
1600-SIGNIFICANT OTHER AT BEDSIDE -PT LETHARGIC BUT RESPONDS TO SAME WITH
GREETING
1630-ATTEMPTING PRODUCTIVE COUGH-NOT ABLE TO FOLLOW DIRECTION

## 2017-11-18 NOTE — NUR
0715-REPORT AND STATUS GIVEN TO DR WINTERS-ORDER RECIEVED-ADDITIONAL 650
TYLENOL SUPP LA AND SOLUMEDROL 125MG IVP GIVEN-PT SEVERELY AGITATED PULLING AT
LINES. ABLE TO STATE OCC WORD -NOT ABLE TO STATE NAME-AND SITUATION-ATTEMPTED
ORIENTATION WITH NO EFFECT-1:1 AT ARMS  LENGTHHAND-
0800-SIGNIFICANT RGFMT-IEF-QI [facility]-INFORMED OF CURRENT STATUS-TEMPERATURE
AND MEASURES TAKEN PER DR WINTERS-ASSISTED WITH COLD WASH CLOTHES STATED PT
VERY HOT NATURED-PT CONTINUES TO REACH LINES-PREVENTED FROM PULLING- NOTED
MORE USE WITH L -SOFT WRIST RESTRAINT PLACED TO L HAND-TO PREVENT DANGEROUS
PULL ON LINES-SIGNIFICANT OTHER IN AGREEMENT UNTIL ABLE TO REASON WITH PT
0900-DR MABRY AT [facility]-NOTIFIED OF CURRENT AND MEASURES PER DR WINTERS-NO
ADDITIONAL AT THIS TIME
0935-PT APPEARS CALMER AT THIS TIME -NOT ABLE TO STATE SIGNIFICANT OTHER NAME
OR OWN NAME-

## 2017-11-18 NOTE — NUR
2000: Pt remains Paced on CM -120bpm. Paced on CM. Showing V-Paced. TPM
remains DDD 90/10/15 A-Sense, V-Paced. (Occasional A-Paced, but infrequent)

## 2017-11-18 NOTE — NUR
1900: Pt cool and wet to touch. Linen saturated at this time. Complete linen
change done and pt repositioned for comfort.

## 2017-11-18 NOTE — NUR
0945-DR WINTERS AT BEDSIDE-REVIEWED CURRENT STATUS AND MENTATION-NOT ABLE TO
FOLLOW COMMANDS-NO EFFORT AT SPEECH-GENERALIZED MOANING-TEA COLOURED
URINE-TEMP HAJI-39.6-CORDIS-39.2-DOPAMINE AT 3MCG -AND STOPPED AT THIS TIME
AS DIRECTED BY DR WINTERS

## 2017-11-18 NOTE — HP
PATIENT: DIANE KELIN                              MEDICAL RECORD: [id number]
ACCOUNT: [id number]                                    LOCATION:[facility]
: 61                                            ADMISSION DATE: 17
                                                         
 
                             HISTORY AND PHYSICAL EXAMINATION
 
 
NameDIANE KLEIN (53yo, F) ID# [id number]. Date/Time2017
03:[id number] Dept.NP_[facility]
ClinicProviderEDPEPITO WINTERS MDInsuranceMed Primary: [place]ALTH [place]
(POS)
Insurance # : [id number]
Referring Provider Name : CLEO POE
Employer Name : NONE
Med Secondary: [place]
Insurance # : [id number]
Referring Provider Name : JUDSON JOHNSTON
Employer Name : UNKNOWN
Prescription: MDIM - Member is eligible. Chief Complaint
Coronary artery disease
 
preop visit
Patient's Care Team
Referring Provider ():  CLEO POE: [address] [place]
[address], Ph [phone], Fax [phone] 
Referring Provider (): JUDSON JOHNSTON: [address] [place]
[address], Ph [phone], Fax [phone] 
Patient's Pharmacies
[facility] [place] [address] (ERX): [address] [facility] [place] [address], Ph [phone]
[phone], Fax [phone]
Vitals
BP:156/86 sitting R arm 2017 03:25 pmBP Cuff Size:adult 2017 03:25
pmHR:88,reg 2017 03:26 pmHt:5 ft 6 in 2017 03:26 pmWt:200 lbs 2017
03:26 pmNotes:describes constant pressure to center of chest, worse with activity.
States no different than it has been for past three months, altho it has gotten more
constant. 2017 03:28 pmBMI:32.3 2017 03:26 pmAllergies
Reviewed Allergies
IODINATED CONTRAST- ORAL AND IV DYE: Facial swelling (Severe) - throat swelling,
difficulty breathingSULFA (SULFONAMIDE ANTIBIOTICS): Facial swelling
(Severe)Medications
Reviewed Medications
Aspir-81 81 mg tablet,delayed release
Take 1 tablet(s) every day by oral route.17   [facility]
320 mg-25 mg tablet
Take 1 tablet(s) every day by oral route.17   [facility] 25 mg
tablet
Take 1 tablet(s) every day by oral route at bedtime.17   [facility]
WilsonFarxiga 10 mg tablet
Take 1 tablet(s) every day by oral route.17   [facility]
glargine 100 unit/mL (3 mL) subcutaneous pen
Inject 35 unit(s) every day by subcutaneous route.17   [facility] lispro 100 unit/mL subcutaneous cartridge
Inject 30 unit(s) twice a day by subcutaneous route.17   [facility]labetalol 200 mg tablet
Take 1 tablet(s) 3 times a day by oral route.17   [facility] Centerlevothyroxine 175 mcg tablet
Take 1 tablet(s) every day by oral route.17   [facility] HospitalmetFORMIN
 
 
 
HISTORY AND PHYSICAL                           [id number]    DIANE KLEIN      
 
 
1,000 mg tablet
Take 1 tablet(s) twice a day by oral route with meals.17   [facility] CenterPlaquenil 200 mg tablet
Take 2 tablet(s) twice a day by oral route before meals.17   [facility] CenterSalagen (pilocarpine) 5 mg tablet
Take 1 tablet(s) twice a day by oral route.17   [facility] HospitalTEGretol
200 mg tablet
Take 1 tablet(s) every day by oral route.17   [facility] HospitalZanaflex 4 mg
tablet
Take 2 tablet(s) every day by oral route at bedtime.17   [facility] CenterProblems
Reviewed Problems
 
Carotid artery obstruction - Onset: 2017
Seizure disorder - Onset: 2017
Diabetes mellitus - Onset: 2017
Lupus erythematosus - Onset: 2017
Coronary arteriosclerosis - Onset: 2017
Family History
Discussed Family History
 
Unspecified Relation- Disorder of cardiovascular system
- parentSocial History
Discussed Social History
Cardiology
Family history of heart disease?: Y
Smoking Status: Never smoker
High Cholesterol: Y
High blood pressure: Y
Overweight: Y
Diabetes: Y
Alcohol intake: None
Surgical History
Reviewed Surgical History
 
Breast reconstruction
Hysterectomy/revise vagina
Removal of gallbladder
 
lipoma removed L ankle
GYN History
(not configured)
Past Medical History
Discussed Past Medical History
Asthma: Y
Diabetes: Y
GERD: Y
High Blood Pressure: Y
Hyperlipidemia: Y
Hypothyroidism: Y
Seizures/Epilepsy: Y
Shortness of Breath: Y
Notes: lupus, sjogren's syndrome, diverticulitis, hernia,
Documents for Discussion
N/A
 
 
 
HISTORY AND PHYSICAL                           [id number]    DIANE KLEIN      
 
 
Screening
None recorded.
HPI
Angina/Chest Pain
Reported by patient.
Location: chest; radiates to the jaw; radiates to the back
Quality: pressure
Severity: very limiting; moderate 
Duration: has noted for months; typical duration is minutes 
Onset/Timing: nocturnally; multiple times per day 
Context: exertional; at rest
Alleviating Factors: relieved with NTG 
Aggravating Factors: worse with activity
Associated Symptoms: no associated palpitations; chest discomfort; shortness of
breath; dizziness; nausea
coronary artery disease with angina
ROS
Patient reports exercise intolerance  but reports no fever, no night sweats, no
significant weight gain, and no significant weight loss. She reports chest pain on
exertion, arm pain on exertion, and shortness of breath when walking but reports no
shortness of breath when lying down, no palpitations, and no known heart murmur. She
reports shortness of breath but reports no cough, no wheezing, and no coughing up
blood. She reports back pain but reports no mus maria luz aches, no muscle weakness, no
arthralgias/joint pain, and no swelling in the extremities. She reports sleep
disturbances  but reports feeling safe in relationship and no alcohol abuse; history
of depression. She reports no dry eyes, no irritation, and n o vision change. She
reports no difficulty hearing and no ear pain. She reports no frequent nosebleeds
and no nose/sinus problems. She reports no sore throat, no bleeding gums, no
snoring, no dry mouth, no mouth ulcers, no oral abnormalities, and no teeth 
problems. She reports no jugular vein distension and no swollen glands. She reports
no abdominal pain, no vomiting, normal appetite, no diarrhea, not vomiting blood, no
nausea, and no constipation. She reports no incontinence, no difficulty urinating,
no h ematuria, and no increased frequency. She reports no abnormal mole, no
jaundice, and no rashes. She reports no loss of consciousness, no weakness, no
numbness, no seizures, no dizziness, and no headaches. She reports no fatigue. She
reports no swollen gla nds and no bruising. She reports no runny nose, no sinus
pressure, no itching, no hives, and no frequent sneezing. 
ROS as noted in the HPI
Physical Exam
Patient is a 52-year-old female.
 
Constitutional: General Appearance well nourished and developed and
healthy-appearing. Level of Distress NAD. Ambulation ambulating normally.
 
Cardiovascular:  Apical Impulse not displaced or no thrill. Heart Auscultation
normal s1 and s2; no murmurs, rubs, or gallops; and RRR. Arterial Pulses no
abdominal aor ta bruits, femoral bruits, or popliteal bruits and 2+ bilateral,
carotid 2+ bilateral, femoral 2+ bilateral, popliteal 2+ bilateral, and dorsalis
pedis 2+ bilateral. Edema no edema or varicosities.
 
Lungs: Repiratory Effort no dyspnea. Percussion no hyperr esonance or dullness or
flatness. Auscultation no wheezing, rhonchi, or rales / crackles and breathing
sounds normal, good air movement, and CTA except as noted.
 
Abdomen: Bowl Sounds normal. Inspection and Palpation no tenderness, guarding,
 
 
 
HISTORY AND PHYSICAL                           [id number]    LATOYA,DIANE CHANCE      
 
 
masses, or jose ound tenderness and soft and non-distended. Liver non-tender and no
hepatomegaly. Spleen non-tender and no splenomegaly. Hernia none palpable.
 
Musculoskeletal System: Gait And Stance normal gait and stance. Digits and Nails
normal nails and no cyanosis.
 
Neurologic: Cranial Nerves grossly intact. Reflexes DTRs 2+ bilaterally throughout.
Sensation grossly intact.
 
Lymph Nodes: Lymph Nodes no cervical LAD, supraclavicular LAD, axillary LAD, or
inguinal LAD.
 
Eyes: Lids and Conjunctivae no discharge or pallor and non-injected. Pupils PERRLA.
Cornea grossly intact. EOM EOMI. Lens clear. Sclera non-icteric.
 
Neck: Neck no masses, enlarged lymph nodes, or carotid bruits and supple and trachea
midline. Thyroid no enlargement or nodules and non-tender.
 
Skin: Inspection and Palpation no rash, lesions, ulcers, jaundice, or abnormal nevi.
Assessment / Plan
severe occlusive coronary artery disease with angina pectoris
mild to moderate carotid artery disease
Lupus erythematosus
 
1. Coronary arteriosclerosis
I25.10: Atherosclerotic heart disease of native coronary artery without angina
pectoris
 
2. Lupus erythematosus
L93.0: Discoid lupus erythematosus
 
3. Diabetes mellitus
E11.9: Type 2 diabetes mellitus without complications
 
4. Seizure disorder
G40.909: Epilepsy, unspecified, not intractable, without status epilepticus
EPILEPSY: CARE INSTRUCTIONS
 
5. Carotid artery obstruction  - asymptomatic carotid artery disease
I65.22: Occlusion and stenosis of left carotid artery
 
Discussion Notes
severe occlusive coronary artery disease with angina pectoris. I have discussed her
disease process with her and her significant other in detail as well as the
alternative methods of treatment. I discussed coronary artery bypass including the
expected benefits and risks i ncluding bleeding, infection, stroke, death, and the
imponderables. She understands all of the above and wishes to proceed with coronary
artery bypass
We will work her carotid disease and further postoperative
 
 
 
 
 
HISTORY AND PHYSICAL                           [id number]    DIANE KLEIN EDWARD MD             
 
 
 
Electronically Signed by GODFREY WINTERS on 17 at 1037
 
 
 
 
 
 
 
 
 
 
 
 
 
 
 
 
 
 
 
 
 
 
 
 
 
 
 
 
 
 
 
 
 
 
 
 
 
 
 
 
 
CC:                                                             [id number]
DICTATION DATE: 17 1500     : NEFTALI  17 0851      ADM IN  
                                                                              
[facility]                                          
[address] [place] [address]

## 2017-11-18 NOTE — NUR
2100: Pt lab drawn and results reviewed with Dr. Nina. No new orders at this
time. Continue to titrate Insulin gtt as per protocol/orders as per IV gtt
flow sheet.

## 2017-11-19 VITALS — DIASTOLIC BLOOD PRESSURE: 58 MMHG | SYSTOLIC BLOOD PRESSURE: 119 MMHG

## 2017-11-19 VITALS — DIASTOLIC BLOOD PRESSURE: 61 MMHG | SYSTOLIC BLOOD PRESSURE: 146 MMHG

## 2017-11-19 VITALS — DIASTOLIC BLOOD PRESSURE: 65 MMHG | SYSTOLIC BLOOD PRESSURE: 117 MMHG

## 2017-11-19 VITALS — DIASTOLIC BLOOD PRESSURE: 55 MMHG | SYSTOLIC BLOOD PRESSURE: 119 MMHG

## 2017-11-19 VITALS — SYSTOLIC BLOOD PRESSURE: 112 MMHG | DIASTOLIC BLOOD PRESSURE: 56 MMHG

## 2017-11-19 VITALS — SYSTOLIC BLOOD PRESSURE: 134 MMHG | DIASTOLIC BLOOD PRESSURE: 65 MMHG

## 2017-11-19 VITALS — SYSTOLIC BLOOD PRESSURE: 117 MMHG | DIASTOLIC BLOOD PRESSURE: 61 MMHG

## 2017-11-19 VITALS — DIASTOLIC BLOOD PRESSURE: 64 MMHG | SYSTOLIC BLOOD PRESSURE: 115 MMHG

## 2017-11-19 VITALS — SYSTOLIC BLOOD PRESSURE: 124 MMHG | DIASTOLIC BLOOD PRESSURE: 59 MMHG

## 2017-11-19 VITALS — SYSTOLIC BLOOD PRESSURE: 125 MMHG | DIASTOLIC BLOOD PRESSURE: 62 MMHG

## 2017-11-19 VITALS — DIASTOLIC BLOOD PRESSURE: 62 MMHG | SYSTOLIC BLOOD PRESSURE: 120 MMHG

## 2017-11-19 VITALS — DIASTOLIC BLOOD PRESSURE: 65 MMHG | SYSTOLIC BLOOD PRESSURE: 147 MMHG

## 2017-11-19 VITALS — DIASTOLIC BLOOD PRESSURE: 66 MMHG | SYSTOLIC BLOOD PRESSURE: 140 MMHG

## 2017-11-19 VITALS — SYSTOLIC BLOOD PRESSURE: 120 MMHG | DIASTOLIC BLOOD PRESSURE: 58 MMHG

## 2017-11-19 VITALS — DIASTOLIC BLOOD PRESSURE: 58 MMHG | SYSTOLIC BLOOD PRESSURE: 116 MMHG

## 2017-11-19 VITALS — DIASTOLIC BLOOD PRESSURE: 68 MMHG | SYSTOLIC BLOOD PRESSURE: 134 MMHG

## 2017-11-19 VITALS — SYSTOLIC BLOOD PRESSURE: 116 MMHG | DIASTOLIC BLOOD PRESSURE: 60 MMHG

## 2017-11-19 VITALS — DIASTOLIC BLOOD PRESSURE: 60 MMHG | SYSTOLIC BLOOD PRESSURE: 127 MMHG

## 2017-11-19 VITALS — DIASTOLIC BLOOD PRESSURE: 60 MMHG | SYSTOLIC BLOOD PRESSURE: 129 MMHG

## 2017-11-19 VITALS — DIASTOLIC BLOOD PRESSURE: 78 MMHG | SYSTOLIC BLOOD PRESSURE: 137 MMHG

## 2017-11-19 VITALS — SYSTOLIC BLOOD PRESSURE: 139 MMHG | DIASTOLIC BLOOD PRESSURE: 69 MMHG

## 2017-11-19 VITALS — SYSTOLIC BLOOD PRESSURE: 124 MMHG | DIASTOLIC BLOOD PRESSURE: 68 MMHG

## 2017-11-19 VITALS — SYSTOLIC BLOOD PRESSURE: 124 MMHG | DIASTOLIC BLOOD PRESSURE: 57 MMHG

## 2017-11-19 VITALS — DIASTOLIC BLOOD PRESSURE: 62 MMHG | SYSTOLIC BLOOD PRESSURE: 122 MMHG

## 2017-11-19 VITALS — SYSTOLIC BLOOD PRESSURE: 114 MMHG | DIASTOLIC BLOOD PRESSURE: 57 MMHG

## 2017-11-19 VITALS — DIASTOLIC BLOOD PRESSURE: 58 MMHG | SYSTOLIC BLOOD PRESSURE: 127 MMHG

## 2017-11-19 VITALS — SYSTOLIC BLOOD PRESSURE: 138 MMHG | DIASTOLIC BLOOD PRESSURE: 66 MMHG

## 2017-11-19 VITALS — SYSTOLIC BLOOD PRESSURE: 126 MMHG | DIASTOLIC BLOOD PRESSURE: 66 MMHG

## 2017-11-19 VITALS — SYSTOLIC BLOOD PRESSURE: 122 MMHG | DIASTOLIC BLOOD PRESSURE: 59 MMHG

## 2017-11-19 VITALS — DIASTOLIC BLOOD PRESSURE: 57 MMHG | SYSTOLIC BLOOD PRESSURE: 120 MMHG

## 2017-11-19 VITALS — SYSTOLIC BLOOD PRESSURE: 127 MMHG | DIASTOLIC BLOOD PRESSURE: 56 MMHG

## 2017-11-19 VITALS — DIASTOLIC BLOOD PRESSURE: 62 MMHG | SYSTOLIC BLOOD PRESSURE: 126 MMHG

## 2017-11-19 VITALS — SYSTOLIC BLOOD PRESSURE: 129 MMHG | DIASTOLIC BLOOD PRESSURE: 61 MMHG

## 2017-11-19 VITALS — DIASTOLIC BLOOD PRESSURE: 61 MMHG | SYSTOLIC BLOOD PRESSURE: 127 MMHG

## 2017-11-19 VITALS — SYSTOLIC BLOOD PRESSURE: 132 MMHG | DIASTOLIC BLOOD PRESSURE: 62 MMHG

## 2017-11-19 VITALS — SYSTOLIC BLOOD PRESSURE: 144 MMHG | DIASTOLIC BLOOD PRESSURE: 66 MMHG

## 2017-11-19 VITALS — SYSTOLIC BLOOD PRESSURE: 123 MMHG | DIASTOLIC BLOOD PRESSURE: 68 MMHG

## 2017-11-19 VITALS — SYSTOLIC BLOOD PRESSURE: 122 MMHG | DIASTOLIC BLOOD PRESSURE: 66 MMHG

## 2017-11-19 VITALS — SYSTOLIC BLOOD PRESSURE: 115 MMHG | DIASTOLIC BLOOD PRESSURE: 57 MMHG

## 2017-11-19 VITALS — DIASTOLIC BLOOD PRESSURE: 59 MMHG | SYSTOLIC BLOOD PRESSURE: 113 MMHG

## 2017-11-19 VITALS — SYSTOLIC BLOOD PRESSURE: 134 MMHG | DIASTOLIC BLOOD PRESSURE: 68 MMHG

## 2017-11-19 VITALS — SYSTOLIC BLOOD PRESSURE: 111 MMHG | DIASTOLIC BLOOD PRESSURE: 57 MMHG

## 2017-11-19 VITALS — DIASTOLIC BLOOD PRESSURE: 59 MMHG | SYSTOLIC BLOOD PRESSURE: 121 MMHG

## 2017-11-19 VITALS — SYSTOLIC BLOOD PRESSURE: 117 MMHG | DIASTOLIC BLOOD PRESSURE: 66 MMHG

## 2017-11-19 VITALS — DIASTOLIC BLOOD PRESSURE: 55 MMHG | SYSTOLIC BLOOD PRESSURE: 105 MMHG

## 2017-11-19 VITALS — SYSTOLIC BLOOD PRESSURE: 138 MMHG | DIASTOLIC BLOOD PRESSURE: 70 MMHG

## 2017-11-19 VITALS — SYSTOLIC BLOOD PRESSURE: 121 MMHG | DIASTOLIC BLOOD PRESSURE: 67 MMHG

## 2017-11-19 VITALS — SYSTOLIC BLOOD PRESSURE: 127 MMHG | DIASTOLIC BLOOD PRESSURE: 58 MMHG

## 2017-11-19 VITALS — SYSTOLIC BLOOD PRESSURE: 129 MMHG | DIASTOLIC BLOOD PRESSURE: 58 MMHG

## 2017-11-19 VITALS — DIASTOLIC BLOOD PRESSURE: 66 MMHG | SYSTOLIC BLOOD PRESSURE: 119 MMHG

## 2017-11-19 VITALS — DIASTOLIC BLOOD PRESSURE: 54 MMHG | SYSTOLIC BLOOD PRESSURE: 116 MMHG

## 2017-11-19 VITALS — DIASTOLIC BLOOD PRESSURE: 54 MMHG | SYSTOLIC BLOOD PRESSURE: 104 MMHG

## 2017-11-19 VITALS — DIASTOLIC BLOOD PRESSURE: 62 MMHG | SYSTOLIC BLOOD PRESSURE: 146 MMHG

## 2017-11-19 VITALS — SYSTOLIC BLOOD PRESSURE: 130 MMHG | DIASTOLIC BLOOD PRESSURE: 60 MMHG

## 2017-11-19 VITALS — SYSTOLIC BLOOD PRESSURE: 127 MMHG | DIASTOLIC BLOOD PRESSURE: 62 MMHG

## 2017-11-19 VITALS — SYSTOLIC BLOOD PRESSURE: 113 MMHG | DIASTOLIC BLOOD PRESSURE: 57 MMHG

## 2017-11-19 VITALS — SYSTOLIC BLOOD PRESSURE: 120 MMHG | DIASTOLIC BLOOD PRESSURE: 66 MMHG

## 2017-11-19 VITALS — SYSTOLIC BLOOD PRESSURE: 128 MMHG | DIASTOLIC BLOOD PRESSURE: 59 MMHG

## 2017-11-19 VITALS — DIASTOLIC BLOOD PRESSURE: 62 MMHG | SYSTOLIC BLOOD PRESSURE: 140 MMHG

## 2017-11-19 VITALS — DIASTOLIC BLOOD PRESSURE: 58 MMHG | SYSTOLIC BLOOD PRESSURE: 132 MMHG

## 2017-11-19 VITALS — DIASTOLIC BLOOD PRESSURE: 62 MMHG | SYSTOLIC BLOOD PRESSURE: 125 MMHG

## 2017-11-19 VITALS — DIASTOLIC BLOOD PRESSURE: 58 MMHG | SYSTOLIC BLOOD PRESSURE: 117 MMHG

## 2017-11-19 VITALS — DIASTOLIC BLOOD PRESSURE: 57 MMHG | SYSTOLIC BLOOD PRESSURE: 108 MMHG

## 2017-11-19 VITALS — SYSTOLIC BLOOD PRESSURE: 103 MMHG | DIASTOLIC BLOOD PRESSURE: 53 MMHG

## 2017-11-19 VITALS — SYSTOLIC BLOOD PRESSURE: 122 MMHG | DIASTOLIC BLOOD PRESSURE: 64 MMHG

## 2017-11-19 VITALS — DIASTOLIC BLOOD PRESSURE: 62 MMHG | SYSTOLIC BLOOD PRESSURE: 132 MMHG

## 2017-11-19 VITALS — DIASTOLIC BLOOD PRESSURE: 61 MMHG | SYSTOLIC BLOOD PRESSURE: 126 MMHG

## 2017-11-19 VITALS — SYSTOLIC BLOOD PRESSURE: 109 MMHG | DIASTOLIC BLOOD PRESSURE: 56 MMHG

## 2017-11-19 VITALS — DIASTOLIC BLOOD PRESSURE: 60 MMHG | SYSTOLIC BLOOD PRESSURE: 114 MMHG

## 2017-11-19 VITALS — DIASTOLIC BLOOD PRESSURE: 59 MMHG | SYSTOLIC BLOOD PRESSURE: 105 MMHG

## 2017-11-19 VITALS — SYSTOLIC BLOOD PRESSURE: 135 MMHG | DIASTOLIC BLOOD PRESSURE: 64 MMHG

## 2017-11-19 VITALS — SYSTOLIC BLOOD PRESSURE: 112 MMHG | DIASTOLIC BLOOD PRESSURE: 57 MMHG

## 2017-11-19 VITALS — DIASTOLIC BLOOD PRESSURE: 57 MMHG | SYSTOLIC BLOOD PRESSURE: 114 MMHG

## 2017-11-19 VITALS — DIASTOLIC BLOOD PRESSURE: 56 MMHG | SYSTOLIC BLOOD PRESSURE: 117 MMHG

## 2017-11-19 VITALS — DIASTOLIC BLOOD PRESSURE: 59 MMHG | SYSTOLIC BLOOD PRESSURE: 123 MMHG

## 2017-11-19 VITALS — DIASTOLIC BLOOD PRESSURE: 65 MMHG | SYSTOLIC BLOOD PRESSURE: 124 MMHG

## 2017-11-19 VITALS — DIASTOLIC BLOOD PRESSURE: 57 MMHG | SYSTOLIC BLOOD PRESSURE: 104 MMHG

## 2017-11-19 VITALS — DIASTOLIC BLOOD PRESSURE: 57 MMHG | SYSTOLIC BLOOD PRESSURE: 132 MMHG

## 2017-11-19 VITALS — SYSTOLIC BLOOD PRESSURE: 119 MMHG | DIASTOLIC BLOOD PRESSURE: 53 MMHG

## 2017-11-19 VITALS — SYSTOLIC BLOOD PRESSURE: 124 MMHG | DIASTOLIC BLOOD PRESSURE: 58 MMHG

## 2017-11-19 VITALS — SYSTOLIC BLOOD PRESSURE: 130 MMHG | DIASTOLIC BLOOD PRESSURE: 63 MMHG

## 2017-11-19 VITALS — DIASTOLIC BLOOD PRESSURE: 61 MMHG | SYSTOLIC BLOOD PRESSURE: 132 MMHG

## 2017-11-19 VITALS — DIASTOLIC BLOOD PRESSURE: 62 MMHG | SYSTOLIC BLOOD PRESSURE: 127 MMHG

## 2017-11-19 VITALS — DIASTOLIC BLOOD PRESSURE: 57 MMHG | SYSTOLIC BLOOD PRESSURE: 140 MMHG

## 2017-11-19 LAB
ALBUMIN SERPL-MCNC: 3.5 G/DL (ref 3.4–5)
ALP SERPL-CCNC: 49 U/L (ref 46–116)
ALT SERPL-CCNC: 89 U/L (ref 10–68)
AMORPHOUS SEDIMENT: (no result) /LPF
ANION GAP SERPL CALC-SCNC: 15.7 MMOL/L (ref 8–16)
APPEARANCE UR: CLEAR
BACTERIA #/AREA URNS HPF: (no result) /HPF
BILIRUB SERPL-MCNC: 0.67 MG/DL (ref 0.2–1.3)
BILIRUB SERPL-MCNC: NEGATIVE MG/DL
BUN SERPL-MCNC: 37 MG/DL (ref 7–18)
CALCIUM SERPL-MCNC: 8.1 MG/DL (ref 8.5–10.1)
CAOX CRY #/AREA URNS HPF: (no result) /HPF
CHLORIDE SERPL-SCNC: 106 MMOL/L (ref 98–107)
CO2 SERPL-SCNC: 24.1 MMOL/L (ref 21–32)
COLOR UR: (no result)
CREAT SERPL-MCNC: 1.4 MG/DL (ref 0.6–1.3)
CRYSTALS #/AREA URNS HPF: (no result) /HPF
ERYTHROCYTE [DISTWIDTH] IN BLOOD BY AUTOMATED COUNT: 14.5 % (ref 11.5–14.5)
GLOBULIN SER-MCNC: 2.7 G/L
GLUCOSE SERPL-MCNC: 128 MG/DL (ref 74–106)
GLUCOSE SERPL-MCNC: NEGATIVE MG/DL
GRAN CASTS #/AREA URNS LPF: (no result) /LPF
HCT VFR BLD CALC: 25.9 % (ref 36–48)
HGB BLD-MCNC: 8.6 G/DL (ref 12–16)
HYALINE CASTS #/AREA URNS LPF: (no result) /LPF
KETONES UR STRIP-MCNC: NEGATIVE MG/DL
MCH RBC QN AUTO: 30.4 PG (ref 26–34)
MCHC RBC AUTO-ENTMCNC: 33.2 G/DL (ref 31–37)
MCV RBC: 91.5 FL (ref 80–100)
MUCOUS THREADS #/AREA URNS LPF: (no result) /LPF
NITRITE UR-MCNC: NEGATIVE MG/ML
OSMOLALITY SERPL CALC.SUM OF ELEC: 293 MOSM/KG (ref 275–300)
PH UR STRIP: 5 [PH] (ref 5–6)
PLATELET # BLD: 114 10X3/UL (ref 130–400)
PMV BLD AUTO: 10.7 FL (ref 7.4–10.4)
POTASSIUM SERPL-SCNC: 3.8 MMOL/L (ref 3.5–5.1)
PROT SERPL-MCNC: 6.2 G/DL (ref 6.4–8.2)
PROT UR-MCNC: (no result) MG/DL
RBC # BLD AUTO: 2.83 10X6/UL (ref 4–5.4)
RBC #/AREA URNS HPF: (no result) /HPF (ref 0–5)
SODIUM SERPL-SCNC: 142 MMOL/L (ref 136–145)
SP GR UR STRIP: 1.03 (ref 1–1.02)
SQUAMOUS #/AREA URNS HPF: (no result) /HPF (ref 0–5)
UROBILINOGEN UR-MCNC: 4 MG/DL
WAXY CASTS #/AREA URNS LPF: (no result) /LPF
WBC # BLD AUTO: 7 10X3/UL (ref 4.8–10.8)
WBC #/AREA URNS HPF: (no result) /HPF (ref 0–5)

## 2017-11-19 NOTE — NUR
0355: Pt with liquid foul smelling BM. Linen changed and pt repositioned for
comfort. Ceron care done.

## 2017-11-19 NOTE — NUR
IS DONE BY PT 500ML-WITH POOR EFFORT -MOANING-AND IN BETWEEN STATED HURTS
CAN'T DO ANY MORE-ENCOURAGED TO PUSH PCA PUMP AND PRODDED TO CONTINUE TO TOTAL
OF 10

## 2017-11-19 NOTE — NUR
0025: Pt remains sustained  V-Paced on CM with occasional A-paced, but
predominantly A-Sense showing. Regular on CM and with auscultation. Pt BP
110-120 CCO 4.4-5.4 at this time.

## 2017-11-19 NOTE — NUR
REASSESSMENT PER FLOWSHEET.  PT STILL CONFUSED AT TIMES, ANSWERS "YES/NO"
QUESTIONS APPROP.  SLOW TO RESPOND AT TIMES.  NO CHANGE IN HR. REFUSES TO
COUGH WHEN ENCOURAGED.  CONT Q2 TURNING AND ENCOURAGING COUGH.

## 2017-11-19 NOTE — NUR
0255: Pt temp 38.4C per Criticore. Tylenol admin at this time PO. Pt answers
questions approp. Pt had difficulty swallowing pills. Pt takes sips of water,
but unable to swallow pills. Pt occasionally drifting off and holding water in
mouth. Oral care done at this time and pill fragments removed. Pt did not have
any choking, but remains HOB 40 degrees at this time. Will continue to
monitor.

## 2017-11-19 NOTE — NUR
0400: Pt temp continues to increase at this time. (See previous notes)
Tyelenol admin via SUPP at this time.

## 2017-11-19 NOTE — NUR
0600: Temp per Vigilance 38.4C, Criticore 38.7C, and Axillary 99.7F. Pt
remains V-Paced on CM with rate 120's. No change in Neuro/Resp status from
assessment. IVF per IV gtts, UOP decreased at this time <30 cc/hr. CVP 11-12.
Pt remains 024LNC with RR25x with SPO2 97%. No bleeding seen and CT output per
I/O flow sheet. Continue to titrate Insulin gtt.

## 2017-11-19 NOTE — NUR
0100: Pt with large liquid light brown/green BM. Bath and linen changed. Ceron
care provided. Proximal right leg dressings changed. (Betadine applied) Pt
grimmaced and yelled with movement, but is oriented at this time. Pt follows
commands and apologized for yelling. Reassured patient. FSBS checked (123) and
continue to titrate Insulin gtt as per orders.

## 2017-11-19 NOTE — NUR
REPORT REC'D.  COMPLETE BATH AND LINEN CHANGE COMPLETED, HAJI CARE DONE.  PT
REQUIRED TOTAL ASSIST WITH TURNING, VERY NON-COMPLIANT WITH TURNING.
ENCOURAGED SPLINTING CHEST WITH PILLOW AND DB&C.  REPOSITIONED WITH PILLOWS.
PT USING PCA.  B/L SOFT WRIST RESTRAINTS ON PER ORDER.  PT WILL PULL AT
DSGS/TUBINGS.  SEE RN ASSESSMENT - TPM, SWAN-JOANNA, DSGS AND A-LINE AS NOTED -
SEE FLOWSHEETS.  CT X 3 TO 2OCM SXN, MINIMAL DRAINAGE, NO AIR LEAK NOTED.
ALARMS ON.  WILL CONT 1:1 NURSING CARE.

## 2017-11-19 NOTE — NUR
0300: FSBS done = 112. (Not greater than 119) Increased from 88. Insulin gtt
resumed at 2.5 units/hr and will continue FSBS Q1H and follow orders.

## 2017-11-19 NOTE — NUR
0530: Pt temp showing 38.7C for Criticore. Pt warm, but not hot to touch.
Axillary temp 99.7F at this time.

## 2017-11-19 NOTE — NUR
0415: Pt opens eyes to verbal and answers most questions with short answers.
Pt is confused though not oriented to time or situation. Pt unable to state
why she is in the hospital. Pt moves x4 extrem vs gravity. Pupils JOLIE+ bilat
and no facial abnormality is seen. Pt unable to follow simple instruction at
this time and does pull at lines and tubes. Pt remains in bilateral soft
wrist restraints to prevent accidental removal of neccassary medical
equipment.

## 2017-11-19 NOTE — NUR
0730-RECIEVED PER FLOW SHEET-STATUSB REPORT SENT TO DR WINTERS-NO ADDITIONAL
INTERVENTION REQUIRED-KCL 5MEQ OVER 1H STARTED PER S/S-PT DOES NOT ATTEMPT TO
SPEAK-WILL OPEN EYES TO PERSISTANT VERBAL -NOTED LOUD AUDIBLE RUB ON HEART
SOUNDS-MEDIASINAL CHEST TUBES IN PLACE TO 20CM SUCTION-NO DRAINAGE OR AIRLEAK
NOTED
0745-DR MABRY AT [facility] -REVIEWED STATUS AND CURRENT TREATMENT-HOME MEDS
REVIEWED-VERBAL HISTORY OF SEIZURE 10YEARS AGO AND NONE NOTED SINCE THEN-PER
SIGNIFICANT OTHER-
0815-SIG. OTHER AT [facility]-PT NOT ABLE TO STATE HIS NAME-REFERS TO HIM AS
HONEY-WITH PERSISTANCE OF NAME-STATES DOESN'T KNOW-ABLE TO STATE OWN NAME OF
TORIRE
0830-FSBS 169-RESTARTED INSULIN AT 3 UNITS/H-URINALYSIS SENT
0845-ATTEMPTED TO SWALLOW [id number] TO COMPREHEND TO USE STRAW-RESULTED IN
POOR SWALLOW--PRODUCTIVE COUGH AND CLEARED AIRWAY
APPEARS MORE ALERT-ABLE TO STATE SIGNIFICANT OTHER NAME-AND MORE VERBALLY
ARTICULATE

## 2017-11-20 VITALS — SYSTOLIC BLOOD PRESSURE: 130 MMHG | DIASTOLIC BLOOD PRESSURE: 63 MMHG

## 2017-11-20 VITALS — SYSTOLIC BLOOD PRESSURE: 123 MMHG | DIASTOLIC BLOOD PRESSURE: 59 MMHG

## 2017-11-20 VITALS — DIASTOLIC BLOOD PRESSURE: 76 MMHG | SYSTOLIC BLOOD PRESSURE: 113 MMHG

## 2017-11-20 VITALS — DIASTOLIC BLOOD PRESSURE: 54 MMHG | SYSTOLIC BLOOD PRESSURE: 101 MMHG

## 2017-11-20 VITALS — DIASTOLIC BLOOD PRESSURE: 65 MMHG | SYSTOLIC BLOOD PRESSURE: 133 MMHG

## 2017-11-20 VITALS — DIASTOLIC BLOOD PRESSURE: 65 MMHG | SYSTOLIC BLOOD PRESSURE: 117 MMHG

## 2017-11-20 VITALS — SYSTOLIC BLOOD PRESSURE: 144 MMHG | DIASTOLIC BLOOD PRESSURE: 62 MMHG

## 2017-11-20 VITALS — DIASTOLIC BLOOD PRESSURE: 60 MMHG | SYSTOLIC BLOOD PRESSURE: 114 MMHG

## 2017-11-20 VITALS — SYSTOLIC BLOOD PRESSURE: 113 MMHG | DIASTOLIC BLOOD PRESSURE: 59 MMHG

## 2017-11-20 VITALS — SYSTOLIC BLOOD PRESSURE: 115 MMHG | DIASTOLIC BLOOD PRESSURE: 58 MMHG

## 2017-11-20 VITALS — DIASTOLIC BLOOD PRESSURE: 64 MMHG | SYSTOLIC BLOOD PRESSURE: 128 MMHG

## 2017-11-20 VITALS — SYSTOLIC BLOOD PRESSURE: 97 MMHG | DIASTOLIC BLOOD PRESSURE: 60 MMHG

## 2017-11-20 VITALS — SYSTOLIC BLOOD PRESSURE: 90 MMHG | DIASTOLIC BLOOD PRESSURE: 60 MMHG

## 2017-11-20 VITALS — DIASTOLIC BLOOD PRESSURE: 62 MMHG | SYSTOLIC BLOOD PRESSURE: 129 MMHG

## 2017-11-20 VITALS — DIASTOLIC BLOOD PRESSURE: 67 MMHG | SYSTOLIC BLOOD PRESSURE: 107 MMHG

## 2017-11-20 VITALS — DIASTOLIC BLOOD PRESSURE: 65 MMHG | SYSTOLIC BLOOD PRESSURE: 112 MMHG

## 2017-11-20 VITALS — SYSTOLIC BLOOD PRESSURE: 115 MMHG | DIASTOLIC BLOOD PRESSURE: 59 MMHG

## 2017-11-20 VITALS — DIASTOLIC BLOOD PRESSURE: 63 MMHG | SYSTOLIC BLOOD PRESSURE: 132 MMHG

## 2017-11-20 VITALS — SYSTOLIC BLOOD PRESSURE: 130 MMHG | DIASTOLIC BLOOD PRESSURE: 62 MMHG

## 2017-11-20 VITALS — DIASTOLIC BLOOD PRESSURE: 62 MMHG | SYSTOLIC BLOOD PRESSURE: 25 MMHG

## 2017-11-20 VITALS — DIASTOLIC BLOOD PRESSURE: 59 MMHG | SYSTOLIC BLOOD PRESSURE: 114 MMHG

## 2017-11-20 VITALS — SYSTOLIC BLOOD PRESSURE: 126 MMHG | DIASTOLIC BLOOD PRESSURE: 61 MMHG

## 2017-11-20 VITALS — DIASTOLIC BLOOD PRESSURE: 65 MMHG | SYSTOLIC BLOOD PRESSURE: 131 MMHG

## 2017-11-20 VITALS — DIASTOLIC BLOOD PRESSURE: 62 MMHG | SYSTOLIC BLOOD PRESSURE: 128 MMHG

## 2017-11-20 VITALS — SYSTOLIC BLOOD PRESSURE: 98 MMHG | DIASTOLIC BLOOD PRESSURE: 63 MMHG

## 2017-11-20 VITALS — SYSTOLIC BLOOD PRESSURE: 126 MMHG | DIASTOLIC BLOOD PRESSURE: 60 MMHG

## 2017-11-20 VITALS — SYSTOLIC BLOOD PRESSURE: 92 MMHG | DIASTOLIC BLOOD PRESSURE: 53 MMHG

## 2017-11-20 VITALS — SYSTOLIC BLOOD PRESSURE: 91 MMHG | DIASTOLIC BLOOD PRESSURE: 60 MMHG

## 2017-11-20 VITALS — DIASTOLIC BLOOD PRESSURE: 64 MMHG | SYSTOLIC BLOOD PRESSURE: 132 MMHG

## 2017-11-20 VITALS — DIASTOLIC BLOOD PRESSURE: 61 MMHG | SYSTOLIC BLOOD PRESSURE: 109 MMHG

## 2017-11-20 VITALS — DIASTOLIC BLOOD PRESSURE: 62 MMHG | SYSTOLIC BLOOD PRESSURE: 117 MMHG

## 2017-11-20 VITALS — SYSTOLIC BLOOD PRESSURE: 121 MMHG | DIASTOLIC BLOOD PRESSURE: 60 MMHG

## 2017-11-20 VITALS — SYSTOLIC BLOOD PRESSURE: 143 MMHG | DIASTOLIC BLOOD PRESSURE: 63 MMHG

## 2017-11-20 VITALS — SYSTOLIC BLOOD PRESSURE: 132 MMHG | DIASTOLIC BLOOD PRESSURE: 61 MMHG

## 2017-11-20 VITALS — DIASTOLIC BLOOD PRESSURE: 64 MMHG | SYSTOLIC BLOOD PRESSURE: 103 MMHG

## 2017-11-20 VITALS — SYSTOLIC BLOOD PRESSURE: 119 MMHG | DIASTOLIC BLOOD PRESSURE: 60 MMHG

## 2017-11-20 VITALS — SYSTOLIC BLOOD PRESSURE: 113 MMHG | DIASTOLIC BLOOD PRESSURE: 58 MMHG

## 2017-11-20 VITALS — DIASTOLIC BLOOD PRESSURE: 60 MMHG | SYSTOLIC BLOOD PRESSURE: 120 MMHG

## 2017-11-20 VITALS — SYSTOLIC BLOOD PRESSURE: 98 MMHG | DIASTOLIC BLOOD PRESSURE: 65 MMHG

## 2017-11-20 VITALS — DIASTOLIC BLOOD PRESSURE: 64 MMHG | SYSTOLIC BLOOD PRESSURE: 134 MMHG

## 2017-11-20 VITALS — SYSTOLIC BLOOD PRESSURE: 105 MMHG | DIASTOLIC BLOOD PRESSURE: 62 MMHG

## 2017-11-20 VITALS — DIASTOLIC BLOOD PRESSURE: 70 MMHG | SYSTOLIC BLOOD PRESSURE: 115 MMHG

## 2017-11-20 VITALS — SYSTOLIC BLOOD PRESSURE: 134 MMHG | DIASTOLIC BLOOD PRESSURE: 64 MMHG

## 2017-11-20 VITALS — DIASTOLIC BLOOD PRESSURE: 62 MMHG | SYSTOLIC BLOOD PRESSURE: 126 MMHG

## 2017-11-20 VITALS — DIASTOLIC BLOOD PRESSURE: 64 MMHG | SYSTOLIC BLOOD PRESSURE: 124 MMHG

## 2017-11-20 VITALS — DIASTOLIC BLOOD PRESSURE: 56 MMHG | SYSTOLIC BLOOD PRESSURE: 99 MMHG

## 2017-11-20 VITALS — SYSTOLIC BLOOD PRESSURE: 96 MMHG | DIASTOLIC BLOOD PRESSURE: 62 MMHG

## 2017-11-20 LAB
ALBUMIN SERPL-MCNC: 2.8 G/DL (ref 3.4–5)
ALP SERPL-CCNC: 87 U/L (ref 46–116)
ALT SERPL-CCNC: 116 U/L (ref 10–68)
ANION GAP SERPL CALC-SCNC: 16.4 MMOL/L (ref 8–16)
BILIRUB SERPL-MCNC: 0.5 MG/DL (ref 0.2–1.3)
BUN SERPL-MCNC: 57 MG/DL (ref 7–18)
CALCIUM SERPL-MCNC: 7.8 MG/DL (ref 8.5–10.1)
CHLORIDE SERPL-SCNC: 99 MMOL/L (ref 98–107)
CO2 SERPL-SCNC: 22.3 MMOL/L (ref 21–32)
CREAT SERPL-MCNC: 1.3 MG/DL (ref 0.6–1.3)
ERYTHROCYTE [DISTWIDTH] IN BLOOD BY AUTOMATED COUNT: 14.2 % (ref 11.5–14.5)
GLOBULIN SER-MCNC: 3.3 G/L
GLUCOSE SERPL-MCNC: 126 MG/DL (ref 74–106)
HCT VFR BLD CALC: 25.1 % (ref 36–48)
HGB BLD-MCNC: 8.4 G/DL (ref 12–16)
MCH RBC QN AUTO: 30.3 PG (ref 26–34)
MCHC RBC AUTO-ENTMCNC: 33.5 G/DL (ref 31–37)
MCV RBC: 90.6 FL (ref 80–100)
OSMOLALITY SERPL CALC.SUM OF ELEC: 285 MOSM/KG (ref 275–300)
PLATELET # BLD: 134 10X3/UL (ref 130–400)
PMV BLD AUTO: 11.2 FL (ref 7.4–10.4)
POTASSIUM SERPL-SCNC: 3.7 MMOL/L (ref 3.5–5.1)
PROT SERPL-MCNC: 6.1 G/DL (ref 6.4–8.2)
RBC # BLD AUTO: 2.77 10X6/UL (ref 4–5.4)
SODIUM SERPL-SCNC: 134 MMOL/L (ref 136–145)
WBC # BLD AUTO: 8.1 10X3/UL (ref 4.8–10.8)

## 2017-11-20 NOTE — NUR
REPORT RECIEVD ASSESSMENT COMPLETED. SEE FLOW SHEET FOR FURTHER DETAILS. TPM
IS VENTRICULARLY PACING. RECIEVED IN REPORT THAT MD'S KNOW OF THE PACER. PT
RESTING IN BED C/O SOME INCISIONAL PAIN. PT IS REPOSITIONED FOR COMFORT, WILL
CONTINUE TO MONITOR.

## 2017-11-20 NOTE — NUR
PT'S SPO2 STARTED TO DECREASE BI-PAP PUT ON PER ORDERS. SPO2 INCREASED. PT
POSITIONED FOR COMFORT. WILL CONTINUE TO MONITOR.

## 2017-11-20 NOTE — NUR
DR SORIANO BY TO SEE PATIENT. TO CHANGE FLUIDS.
BLADDER SCANNED SINCE PATIENT HAS NOT URINATED (OR REQUESTED TO URINATE) SINCE
HAJI CATHETER REMOVED THIS MORNING. SHOWS ONLY 77MLS IN BLADDER.

## 2017-11-20 NOTE — NUR
Dr Rice by to see patient. Asks for Amiodarone drip to be started.
Discussing with s.o. and patient.

## 2017-11-20 NOTE — NUR
INSULIN DROP PAUSED PER PROTOCOL. PT HAS NOT EATEN ANYTHING FROM DINNER TRAY.
BREATHING TREATMENT CURRENTLY IN PROGRESS.

## 2017-11-20 NOTE — NUR
REASSESSMENT PER FLOWSHEET, NO ACUTE CHANGES. , V-PACED.  BIPAP REMAINS
ON POST RESP TX, RR WAS 30 - ENOCOURAGED SLOW DEEP BREATHS.  RESTING QUIETLY
AT THIS TIME, NO SIGN OF DISTRESS.  ALARMS ON. PT DENIES NEED FOR PCA
RECENTLY.

## 2017-11-20 NOTE — NUR
Nutrition follow-up:
Diet: Clear liquids
labs reviewed
Wt: 211#
RDN will monitor patients diet advancement and tolerance.

## 2017-11-20 NOTE — NUR
Report received. Assumed care of patient. Pt has chest tubes x3. No air
leak noted, minimal output. Pt on 3l NC.
Temp pacer (,10,15) ventricularly paced.
[place] line in use, locked and secured.
Right radial art line.
Dressings to right leg intact; WILFRIDO drain x2, bulbs compressed.
Criticor gonzalez catheter, dark urine.

## 2017-11-20 NOTE — NUR
Dr Nina has been by. Chest tubes pulled. dressings changed per orders.
[place] line removed, tip intact. Site covered with gauze and tegaderm.
Art line removed, tip intact. Site covered with band-aid.
WILFRIDO drains removed, sites cleaned with iodine swabs, and gauze and hypafix
dressing placed.

## 2017-11-20 NOTE — NUR
Pt asked to rate pain level on scale of 0-10 with 10 being the worst pain ever
for STS documentation. Pt says "this is the worst pain ever, 10. It's a 10."
Pt remains on Demerol PCA 10mg/Q10min PRN.

## 2017-11-21 VITALS — SYSTOLIC BLOOD PRESSURE: 111 MMHG | DIASTOLIC BLOOD PRESSURE: 63 MMHG

## 2017-11-21 VITALS — DIASTOLIC BLOOD PRESSURE: 69 MMHG | SYSTOLIC BLOOD PRESSURE: 111 MMHG

## 2017-11-21 VITALS — SYSTOLIC BLOOD PRESSURE: 115 MMHG | DIASTOLIC BLOOD PRESSURE: 70 MMHG

## 2017-11-21 VITALS — SYSTOLIC BLOOD PRESSURE: 101 MMHG | DIASTOLIC BLOOD PRESSURE: 60 MMHG

## 2017-11-21 VITALS — DIASTOLIC BLOOD PRESSURE: 61 MMHG | SYSTOLIC BLOOD PRESSURE: 107 MMHG

## 2017-11-21 VITALS — DIASTOLIC BLOOD PRESSURE: 76 MMHG | SYSTOLIC BLOOD PRESSURE: 114 MMHG

## 2017-11-21 VITALS — DIASTOLIC BLOOD PRESSURE: 58 MMHG | SYSTOLIC BLOOD PRESSURE: 106 MMHG

## 2017-11-21 VITALS — DIASTOLIC BLOOD PRESSURE: 68 MMHG | SYSTOLIC BLOOD PRESSURE: 113 MMHG

## 2017-11-21 VITALS — DIASTOLIC BLOOD PRESSURE: 74 MMHG | SYSTOLIC BLOOD PRESSURE: 132 MMHG

## 2017-11-21 VITALS — SYSTOLIC BLOOD PRESSURE: 104 MMHG | DIASTOLIC BLOOD PRESSURE: 61 MMHG

## 2017-11-21 VITALS — DIASTOLIC BLOOD PRESSURE: 56 MMHG | SYSTOLIC BLOOD PRESSURE: 98 MMHG

## 2017-11-21 VITALS — DIASTOLIC BLOOD PRESSURE: 52 MMHG | SYSTOLIC BLOOD PRESSURE: 115 MMHG

## 2017-11-21 VITALS — DIASTOLIC BLOOD PRESSURE: 62 MMHG | SYSTOLIC BLOOD PRESSURE: 95 MMHG

## 2017-11-21 VITALS — DIASTOLIC BLOOD PRESSURE: 57 MMHG | SYSTOLIC BLOOD PRESSURE: 100 MMHG

## 2017-11-21 VITALS — DIASTOLIC BLOOD PRESSURE: 67 MMHG | SYSTOLIC BLOOD PRESSURE: 115 MMHG

## 2017-11-21 VITALS — DIASTOLIC BLOOD PRESSURE: 65 MMHG | SYSTOLIC BLOOD PRESSURE: 116 MMHG

## 2017-11-21 VITALS — DIASTOLIC BLOOD PRESSURE: 63 MMHG | SYSTOLIC BLOOD PRESSURE: 101 MMHG

## 2017-11-21 VITALS — SYSTOLIC BLOOD PRESSURE: 112 MMHG | DIASTOLIC BLOOD PRESSURE: 57 MMHG

## 2017-11-21 VITALS — DIASTOLIC BLOOD PRESSURE: 64 MMHG | SYSTOLIC BLOOD PRESSURE: 106 MMHG

## 2017-11-21 VITALS — SYSTOLIC BLOOD PRESSURE: 112 MMHG | DIASTOLIC BLOOD PRESSURE: 70 MMHG

## 2017-11-21 VITALS — SYSTOLIC BLOOD PRESSURE: 113 MMHG | DIASTOLIC BLOOD PRESSURE: 71 MMHG

## 2017-11-21 VITALS — SYSTOLIC BLOOD PRESSURE: 145 MMHG | DIASTOLIC BLOOD PRESSURE: 82 MMHG

## 2017-11-21 VITALS — SYSTOLIC BLOOD PRESSURE: 118 MMHG | DIASTOLIC BLOOD PRESSURE: 68 MMHG

## 2017-11-21 VITALS — DIASTOLIC BLOOD PRESSURE: 57 MMHG | SYSTOLIC BLOOD PRESSURE: 105 MMHG

## 2017-11-21 LAB
ALBUMIN SERPL-MCNC: 2.5 G/DL (ref 3.4–5)
ALP SERPL-CCNC: 142 U/L (ref 46–116)
ALT SERPL-CCNC: 869 U/L (ref 10–68)
ANION GAP SERPL CALC-SCNC: 18 MMOL/L (ref 8–16)
APPEARANCE UR: (no result)
BACTERIA #/AREA URNS HPF: (no result) /HPF
BILIRUB SERPL-MCNC: 0.7 MG/DL (ref 0.2–1.3)
BILIRUB SERPL-MCNC: NEGATIVE MG/DL
BUN SERPL-MCNC: 74 MG/DL (ref 7–18)
CALCIUM SERPL-MCNC: 8 MG/DL (ref 8.5–10.1)
CENTROMERE B AB SER-ACNC: <0.2 AI (ref 0–0.9)
CHLORIDE SERPL-SCNC: 95 MMOL/L (ref 98–107)
CHROMATIN AB SERPL-ACNC: <0.2 AI (ref 0–0.9)
CO2 SERPL-SCNC: 21.9 MMOL/L (ref 21–32)
COLOR UR: (no result)
CREAT SERPL-MCNC: 2 MG/DL (ref 0.6–1.3)
CREATININE - URINE: 188.2 MG/DL (ref 30–125)
DSDNA AB SER-ACNC: <1 IU/ML (ref 0–9)
ENA JO1 AB SER-ACNC: <0.2 AI (ref 0–0.9)
ENA RNP AB SER-ACNC: 0.4 AI (ref 0–0.9)
ENA SCL70 AB SER-ACNC: <0.2 AI (ref 0–0.9)
ENA SM AB SER-ACNC: <0.2 AI (ref 0–0.9)
ENA SS-A AB SER-ACNC: >8 AI (ref 0–0.9)
ENA SS-B AB SER-ACNC: <0.2 AI (ref 0–0.9)
ERYTHROCYTE [DISTWIDTH] IN BLOOD BY AUTOMATED COUNT: 13.9 % (ref 11.5–14.5)
GLOBULIN SER-MCNC: 3.6 G/L
GLUCOSE SERPL-MCNC: 140 MG/DL (ref 74–106)
GLUCOSE SERPL-MCNC: NEGATIVE MG/DL
HCT VFR BLD CALC: 25 % (ref 36–48)
HGB BLD-MCNC: 8.3 G/DL (ref 12–16)
HYALINE CASTS #/AREA URNS LPF: (no result) /LPF
KETONES UR STRIP-MCNC: NEGATIVE MG/DL
MCH RBC QN AUTO: 29.6 PG (ref 26–34)
MCHC RBC AUTO-ENTMCNC: 33.2 G/DL (ref 31–37)
MCV RBC: 89.3 FL (ref 80–100)
MUCOUS THREADS #/AREA URNS LPF: (no result) /LPF
NITRITE UR-MCNC: NEGATIVE MG/ML
OSMOLALITY SERPL CALC.SUM OF ELEC: 286 MOSM/KG (ref 275–300)
PH UR STRIP: 5 [PH] (ref 5–6)
PLATELET # BLD: 193 10X3/UL (ref 130–400)
PMV BLD AUTO: 11.8 FL (ref 7.4–10.4)
POTASSIUM SERPL-SCNC: 3.9 MMOL/L (ref 3.5–5.1)
PROT SERPL-MCNC: 6.1 G/DL (ref 6.4–8.2)
PROT UR-MCNC: (no result) MG/DL
PROT UR-MCNC: 104 MG/DL (ref 0–11.9)
PROT/CREAT UR: 0.6 MG/G
RBC # BLD AUTO: 2.8 10X6/UL (ref 4–5.4)
RBC #/AREA URNS HPF: (no result) /HPF (ref 0–5)
SODIUM SERPL-SCNC: 131 MMOL/L (ref 136–145)
SODIUM UR-SCNC: 17 MMOL/L (ref 20–110)
SP GR UR STRIP: 1.02 (ref 1–1.02)
SQUAMOUS #/AREA URNS HPF: (no result) /HPF (ref 0–5)
UROBILINOGEN UR-MCNC: NORMAL MG/DL
WBC # BLD AUTO: 8 10X3/UL (ref 4.8–10.8)
WBC #/AREA URNS HPF: (no result) /HPF (ref 0–5)
YEAST #/AREA URNS HPF: (no result) /HPF

## 2017-11-21 PROCEDURE — 5A09557 ASSISTANCE WITH RESPIRATORY VENTILATION, GREATER THAN 96 CONSECUTIVE HOURS, CONTINUOUS POSITIVE AIRWAY PRESSURE: ICD-10-PCS | Performed by: THORACIC SURGERY (CARDIOTHORACIC VASCULAR SURGERY)

## 2017-11-21 NOTE — NUR
NO VISITORS AT THIS TIME. HS MEDS GIVEN 1 AT A TIME WITH TIME ALLOWED FOR PT
TO RECOVER ON BIPAP BEFORE ADMINISTERING THE NEXT ONE. NO SWALLOWING
DIFFICULTY

## 2017-11-21 NOTE — NUR
REPORT RECEIVED AND CARE ASSUMED. SHIFT ASSESSMENT COMPLETED SEE FLOWSHEET. PT
AAOX4 WITH CLEAR SPEECH. NOTE SOME CONFUSION WHEN FOLLOWING A COMPLETE
THOUGHT. PT SOMEWHAT SLOW TO PROCESS INFORMATION IMPARTED. SPEECH CLEAR ABLE
TO MAKE NEEDS KNOWN. IVF ARE LABELED AND DATED WITH SOME DUE TO BE CHANGED ON
THIS SHIFT. WILL CHART ON MAR WHEN DONE. PT BEING MONITORED PER STANDARD CVICU
PROTOCOL WITH ALL ALARMS SET AND VERIFIED. PT ON 50% BIPAP. TOLERATING WELL.
PER REPORT FROM OFF GOING RN PT UNABLE TO TOLERATE BEING OFF OF BIPAP AND
DESATS QUICKLY WHEN PLACED ON OXIMIZER FOR MEALS. RN REPORTED PT HAD TO BE PUT
BACK ON BIPAP WITH POOR INTAKE. BED IN LOW POSITION AND CALL LIGHT IN REACH

## 2017-11-21 NOTE — NUR
* Is the patient Alert and Oriented? Yes  0
* How many steps to enter\exit or inside your home? 0  0
* PCP Dr. Jo  0
* Pharmacy [facility] on [address]  0
* Preadmission Environment Home with Family  0
* ADLs Independent  0
* Equipment Crutch
Glucometer
Rolling Walker  0
* List name and contact numbers for known caregivers / representatives who
currently or will assist patient after discharge: Spouse - Eliseo  [phone]
or [phone]  0
* Additional services required to return to the preadmission environment? No
0
* Can the patient safely return to the preadmission environment? Yes  0
* Has this patient been hospitalized within the prior 30 days at any hospital?
No
 
Patient Name: DIANE KLEIN Admission Status: Elective
Accout number: [id number] Admission Date: 2017
: 1965 Admission Diagnosis:
Attending: GODFREY WINTERS Current LOS: 4
 
Anticipated DC Date:
2017
Planned Disposition: Home
Primary Insurance: [employer] O
 
 
Discharge Planning Comments: CM spoke with spouse via telephone to assess dc
plans/needs. He reports patient lives with him and was independent with all
ADL's & IADL's prior to admission. She has a rolling walker & crutches at home
but does not use them. She has not had home health services in the past. At
dc, patient will return home. No needs identified or verbalized at this time.
CM will follow & assist as needed.
 
 
 
 
 
 
: Yumiko Mora

## 2017-11-21 NOTE — NUR
REASSESSMENT COMPLETED. SEE FLOWSHEET FOR FULL DETAILS. PT IS DROWSY AT THIS
TIME. RADIOLOGY AT BEDSIDE FOR PA AND LATERAL. WILL ACCOMPANY PT TO ENSURE
SAFETY, AND MONITORING.

## 2017-11-22 VITALS — DIASTOLIC BLOOD PRESSURE: 71 MMHG | SYSTOLIC BLOOD PRESSURE: 134 MMHG

## 2017-11-22 VITALS — DIASTOLIC BLOOD PRESSURE: 67 MMHG | SYSTOLIC BLOOD PRESSURE: 107 MMHG

## 2017-11-22 VITALS — SYSTOLIC BLOOD PRESSURE: 114 MMHG | DIASTOLIC BLOOD PRESSURE: 75 MMHG

## 2017-11-22 VITALS — SYSTOLIC BLOOD PRESSURE: 129 MMHG | DIASTOLIC BLOOD PRESSURE: 78 MMHG

## 2017-11-22 VITALS — DIASTOLIC BLOOD PRESSURE: 60 MMHG | SYSTOLIC BLOOD PRESSURE: 93 MMHG

## 2017-11-22 VITALS — SYSTOLIC BLOOD PRESSURE: 119 MMHG | DIASTOLIC BLOOD PRESSURE: 73 MMHG

## 2017-11-22 VITALS — DIASTOLIC BLOOD PRESSURE: 65 MMHG | SYSTOLIC BLOOD PRESSURE: 97 MMHG

## 2017-11-22 VITALS — DIASTOLIC BLOOD PRESSURE: 63 MMHG | SYSTOLIC BLOOD PRESSURE: 111 MMHG

## 2017-11-22 VITALS — DIASTOLIC BLOOD PRESSURE: 50 MMHG | SYSTOLIC BLOOD PRESSURE: 119 MMHG

## 2017-11-22 VITALS — DIASTOLIC BLOOD PRESSURE: 71 MMHG | SYSTOLIC BLOOD PRESSURE: 113 MMHG

## 2017-11-22 VITALS — SYSTOLIC BLOOD PRESSURE: 104 MMHG | DIASTOLIC BLOOD PRESSURE: 69 MMHG

## 2017-11-22 VITALS — DIASTOLIC BLOOD PRESSURE: 68 MMHG | SYSTOLIC BLOOD PRESSURE: 101 MMHG

## 2017-11-22 VITALS — SYSTOLIC BLOOD PRESSURE: 121 MMHG | DIASTOLIC BLOOD PRESSURE: 63 MMHG

## 2017-11-22 VITALS — SYSTOLIC BLOOD PRESSURE: 122 MMHG | DIASTOLIC BLOOD PRESSURE: 73 MMHG

## 2017-11-22 VITALS — SYSTOLIC BLOOD PRESSURE: 111 MMHG | DIASTOLIC BLOOD PRESSURE: 75 MMHG

## 2017-11-22 VITALS — DIASTOLIC BLOOD PRESSURE: 66 MMHG | SYSTOLIC BLOOD PRESSURE: 114 MMHG

## 2017-11-22 VITALS — SYSTOLIC BLOOD PRESSURE: 114 MMHG | DIASTOLIC BLOOD PRESSURE: 67 MMHG

## 2017-11-22 VITALS — SYSTOLIC BLOOD PRESSURE: 106 MMHG | DIASTOLIC BLOOD PRESSURE: 61 MMHG

## 2017-11-22 VITALS — DIASTOLIC BLOOD PRESSURE: 67 MMHG | SYSTOLIC BLOOD PRESSURE: 115 MMHG

## 2017-11-22 VITALS — SYSTOLIC BLOOD PRESSURE: 120 MMHG | DIASTOLIC BLOOD PRESSURE: 76 MMHG

## 2017-11-22 VITALS — SYSTOLIC BLOOD PRESSURE: 130 MMHG | DIASTOLIC BLOOD PRESSURE: 72 MMHG

## 2017-11-22 VITALS — SYSTOLIC BLOOD PRESSURE: 119 MMHG | DIASTOLIC BLOOD PRESSURE: 68 MMHG

## 2017-11-22 VITALS — SYSTOLIC BLOOD PRESSURE: 131 MMHG | DIASTOLIC BLOOD PRESSURE: 80 MMHG

## 2017-11-22 LAB
ALBUMIN SERPL-MCNC: 2.2 G/DL (ref 3.4–5)
ALP SERPL-CCNC: 174 U/L (ref 46–116)
ALT SERPL-CCNC: 435 U/L (ref 10–68)
ANION GAP SERPL CALC-SCNC: 21.5 MMOL/L (ref 8–16)
BILIRUB SERPL-MCNC: 0.53 MG/DL (ref 0.2–1.3)
BUN SERPL-MCNC: 81 MG/DL (ref 7–18)
CALCIUM SERPL-MCNC: 7.5 MG/DL (ref 8.5–10.1)
CHLORIDE SERPL-SCNC: 95 MMOL/L (ref 98–107)
CK MB SERPL-MCNC: 8.9 U/L (ref 0–3.6)
CK SERPL-CCNC: 3365 UL (ref 21–215)
CO2 SERPL-SCNC: 19.8 MMOL/L (ref 21–32)
CREAT SERPL-MCNC: 1.4 MG/DL (ref 0.6–1.3)
ERYTHROCYTE [DISTWIDTH] IN BLOOD BY AUTOMATED COUNT: 14.7 % (ref 11.5–14.5)
GLOBULIN SER-MCNC: 3.3 G/L
GLUCOSE SERPL-MCNC: 237 MG/DL (ref 74–106)
HCT VFR BLD CALC: 27.8 % (ref 36–48)
HGB BLD-MCNC: 9.4 G/DL (ref 12–16)
MAGNESIUM SERPL-MCNC: 3.9 MG/DL (ref 1.8–2.4)
MCH RBC QN AUTO: 29.9 PG (ref 26–34)
MCHC RBC AUTO-ENTMCNC: 33.8 G/DL (ref 31–37)
MCV RBC: 88.5 FL (ref 80–100)
OSMOLALITY SERPL CALC.SUM OF ELEC: 296 MOSM/KG (ref 275–300)
PHOSPHATE SERPL-MCNC: 4.7 MG/DL (ref 2.5–4.9)
PLATELET # BLD: 225 10X3/UL (ref 130–400)
PMV BLD AUTO: 10.7 FL (ref 7.4–10.4)
POTASSIUM SERPL-SCNC: 4.3 MMOL/L (ref 3.5–5.1)
PROT SERPL-MCNC: 5.5 G/DL (ref 6.4–8.2)
RBC # BLD AUTO: 3.14 10X6/UL (ref 4–5.4)
SODIUM SERPL-SCNC: 132 MMOL/L (ref 136–145)
WBC # BLD AUTO: 10 10X3/UL (ref 4.8–10.8)

## 2017-11-22 NOTE — NUR
PT RESTING AT THIS TIME. SPO2 REMAINS MORE STABLE IN HIGH 90'S WHILE PT
SLEEPING WITHOUT PHYSICAL ACTIVITY.

## 2017-11-22 NOTE — NUR
Nutrition Note:
Pt continues on clear liquids. Pt has been NPO/Clear Liquids x 5 days. Noted
pt is unable to wean from BiPAP. Per nursing pt is eating very little due to
inability to breathe.
If diet unable to advance within the next 24 hours rec start nutrition
support. If pt/MD wishes to start TF, rec start Glucerna 1.0 @ 20 ml/hr.
Advance 10 ml every 6-8 hours as tolerated to goal rate of 75 ml/hr. Water
flushes 15 ml/hr.
Goal rate will provide 1800 kcal, 74 g protein and 1515 ml free water.
RD following.

## 2017-11-22 NOTE — NUR
ASSISTED TO RECLINER PER PT. DECOMPENSTATED WITH EXERTION. ONCE AT REST AND
ABLE TO TAKE DEEP BREATHS SATURATIONS INCREASED TO UPPER 90'S. WILL CONT
TO ASSESS.

## 2017-11-22 NOTE — NUR
HS MEDS GIVEN WITHOUT DIFFICULTY. PT TEACHING DONE ON MEDICATIONS PRIOR TO
ADMINISTRATION. PT PASSIVE IN LEARNING. PT DENIES PAIN STATES SHE IS "FINE
WTIH THE GREEN BUTTON" (PCA) "BUT IT JUST DOESNT LAST LONG". PT LEFT WITH CALL
LIGHT IN REACH.

## 2017-11-22 NOTE — NUR
REPORT RECEIVED AND CARE ASSUMED. INITIAL SHIFT ASSESSMENT COMPLETED SEE
FLOWSHEET. PT REMAINS ON BIPAP IS AT 75% AT THIS TIME. CONTINUES TO HAVE TPM
A-SENSING AND V-PACING. BATTERY WAS CHANGED TODAY. IVF AND LINES ARE ALL
LABELED DATED. ALL ARE CURRENT AT THIS TIME. PT CONTINUES TO BE MONITORED PER
STANDARD CVICU PROTOCOL WITH ALL ALARMS SET AND VERIFIED. PT SLEEPING BUT
EASILY AWAKEN AND FOLLOWS COMMANDS ORIENTATED X 4 AT THIS TIME. CALL LIGHT IN
REACH AND DENIES NEEDS AT THIS TIME

## 2017-11-22 NOTE — NUR
REPORT RECIEVED FROM NIGHT SHIFT NURSE. PT RESTING IN BED ON BIPAP. SHIFT
ASSESSMENT COMPLETE PER FLOWSHEET. VSS. TPM . V- PACED AT THIS TIME.
REFER TO FLOWSHEET FOR FURTHER ASSESSMENT DETAILS. CALL LIGHT IN REACH. BED IN
LOW POSITION. WILL CONT TO MONITOR FOR CHANGES THROUGHOUT SHIFT.

## 2017-11-22 NOTE — NUR
REPORT RECIEVED FROM NIGHT SHIFT NURSE. PT RESTING IN BED ON BIPAP. SHIFT
ASSESSMENT COMPLETE PER FLOWSHEET. VSS. TPM VVI 90. V- PACED AT THIS TIME.
REFER TO FLOWSHEET FOR FURTHER ASSESSMENT DETAILS. CALL LIGHT IN REACH. BED IN
LOW POSITION. WILL CONT TO MONITOR FOR CHANGES THROUGHOUT SHIFT.

## 2017-11-22 NOTE — NUR
SHIFT REASSESSMENT COMPLETED. PT CONTINUES ON 50% BIPAP WITH WIDELY FLUCUATING
SPO2. SPO2 HAS POOR CONNECTION BUT EVEN WHEN CORRELATING WITH HEART RATE DROPS
IN MID 80'S FOR BRIEF PERIODS. TPM CONTINUES TO A-SENSE AND A-PACE. HR REMAINS
>100. NOTE DRESSINGS TO RIGHT LEG AND SUBSTERNAL AREAS ARE IN NEED OF
CHANGING. CHANGED AS PER ORDER WITH BETADINE SWABS, IODINE OINTMENT, 4X4'S AND
MEPILEX TAPE AND TEGADERM. PT TOLERATED FAIR. STAPLES INTACT AND NOTE SMALL
AMOUNT OF THIN BLOODY DRAINAGE FROM D/C'D UPPER WILFRIDO SITE. LEFT CVL DRESSING
CHANGED AS PER PROTOCOL. ALL DRESSINGS LABELED AND DATED. ALL IVF AND LINES
ARE NOW CURRENT. CHANGED AS PER LISTED ON MAR. CVP FLUID CHANGED TOO, ALL ARE
LABELED WITH NEXT LINE CHANGE DUE FRIDAY.

## 2017-11-22 NOTE — NUR
Nutrition Note:
Chart reviewed and assmt completed. Pt continues on clear liquid diet. Pt has
been NPO/clears x 5 days.
Rec advancing diet as tolerated when medically feasible. If diet unable to
advance within the next 24 hours, rec start nutrition support of Glucerna 1.0
@ 20 ml/hr. Advance 10 ml every 6-8 hours as tolerated to goal rate of
75 ml/hr. Water flushes 15 ml/hr.
Goal rate will provide 1800 kcal, 74 g protein and 1515 ml free water per day.
RD following.

## 2017-11-22 NOTE — NUR
RADIOLOGY HERE FOR ORDERED PA AND LAT CXR. PT UNABLE TO TOLERATE OFF BIPAP
EVEN FOR SHORT PERIODS OF TIME. THIS IS ALSO THE REPORT OF OFF GOING DAY RN,
THIS IS NOT A NEW FINDING. HAVE TRIED PT WITH OXIMIZER WITH RAPID DESATURATION
OF O2 LEVELS. PCXR ORDERED DUE TO INTOLERANCE OF PT

## 2017-11-22 NOTE — NUR
REASSESSMENT COMPLETE PER FLOWSHEET. NO CHANGES NOTED. FIO2 DROPPED TO 60% ON
BIPAP. WILL CONT TO ASSESS. VSS.

## 2017-11-23 VITALS — SYSTOLIC BLOOD PRESSURE: 100 MMHG | DIASTOLIC BLOOD PRESSURE: 41 MMHG

## 2017-11-23 VITALS — SYSTOLIC BLOOD PRESSURE: 97 MMHG | DIASTOLIC BLOOD PRESSURE: 60 MMHG

## 2017-11-23 VITALS — DIASTOLIC BLOOD PRESSURE: 64 MMHG | SYSTOLIC BLOOD PRESSURE: 101 MMHG

## 2017-11-23 VITALS — SYSTOLIC BLOOD PRESSURE: 103 MMHG | DIASTOLIC BLOOD PRESSURE: 51 MMHG

## 2017-11-23 VITALS — DIASTOLIC BLOOD PRESSURE: 64 MMHG | SYSTOLIC BLOOD PRESSURE: 102 MMHG

## 2017-11-23 VITALS — SYSTOLIC BLOOD PRESSURE: 109 MMHG | DIASTOLIC BLOOD PRESSURE: 64 MMHG

## 2017-11-23 VITALS — SYSTOLIC BLOOD PRESSURE: 104 MMHG | DIASTOLIC BLOOD PRESSURE: 71 MMHG

## 2017-11-23 VITALS — SYSTOLIC BLOOD PRESSURE: 100 MMHG | DIASTOLIC BLOOD PRESSURE: 61 MMHG

## 2017-11-23 VITALS — DIASTOLIC BLOOD PRESSURE: 60 MMHG | SYSTOLIC BLOOD PRESSURE: 97 MMHG

## 2017-11-23 VITALS — SYSTOLIC BLOOD PRESSURE: 126 MMHG | DIASTOLIC BLOOD PRESSURE: 71 MMHG

## 2017-11-23 VITALS — DIASTOLIC BLOOD PRESSURE: 74 MMHG | SYSTOLIC BLOOD PRESSURE: 113 MMHG

## 2017-11-23 VITALS — DIASTOLIC BLOOD PRESSURE: 58 MMHG | SYSTOLIC BLOOD PRESSURE: 93 MMHG

## 2017-11-23 VITALS — SYSTOLIC BLOOD PRESSURE: 105 MMHG | DIASTOLIC BLOOD PRESSURE: 66 MMHG

## 2017-11-23 VITALS — DIASTOLIC BLOOD PRESSURE: 64 MMHG | SYSTOLIC BLOOD PRESSURE: 107 MMHG

## 2017-11-23 VITALS — DIASTOLIC BLOOD PRESSURE: 58 MMHG | SYSTOLIC BLOOD PRESSURE: 99 MMHG

## 2017-11-23 VITALS — DIASTOLIC BLOOD PRESSURE: 64 MMHG | SYSTOLIC BLOOD PRESSURE: 120 MMHG

## 2017-11-23 VITALS — DIASTOLIC BLOOD PRESSURE: 73 MMHG | SYSTOLIC BLOOD PRESSURE: 109 MMHG

## 2017-11-23 VITALS — SYSTOLIC BLOOD PRESSURE: 93 MMHG | DIASTOLIC BLOOD PRESSURE: 70 MMHG

## 2017-11-23 VITALS — DIASTOLIC BLOOD PRESSURE: 66 MMHG | SYSTOLIC BLOOD PRESSURE: 105 MMHG

## 2017-11-23 VITALS — SYSTOLIC BLOOD PRESSURE: 120 MMHG | DIASTOLIC BLOOD PRESSURE: 77 MMHG

## 2017-11-23 VITALS — DIASTOLIC BLOOD PRESSURE: 54 MMHG | SYSTOLIC BLOOD PRESSURE: 109 MMHG

## 2017-11-23 VITALS — SYSTOLIC BLOOD PRESSURE: 103 MMHG | DIASTOLIC BLOOD PRESSURE: 58 MMHG

## 2017-11-23 VITALS — SYSTOLIC BLOOD PRESSURE: 97 MMHG | DIASTOLIC BLOOD PRESSURE: 63 MMHG

## 2017-11-23 VITALS — DIASTOLIC BLOOD PRESSURE: 59 MMHG | SYSTOLIC BLOOD PRESSURE: 92 MMHG

## 2017-11-23 VITALS — SYSTOLIC BLOOD PRESSURE: 99 MMHG | DIASTOLIC BLOOD PRESSURE: 62 MMHG

## 2017-11-23 VITALS — SYSTOLIC BLOOD PRESSURE: 106 MMHG | DIASTOLIC BLOOD PRESSURE: 68 MMHG

## 2017-11-23 VITALS — SYSTOLIC BLOOD PRESSURE: 92 MMHG | DIASTOLIC BLOOD PRESSURE: 61 MMHG

## 2017-11-23 VITALS — SYSTOLIC BLOOD PRESSURE: 117 MMHG | DIASTOLIC BLOOD PRESSURE: 86 MMHG

## 2017-11-23 LAB
ALBUMIN SERPL-MCNC: 2.1 G/DL (ref 3.4–5)
ALP SERPL-CCNC: 308 U/L (ref 46–116)
ALT SERPL-CCNC: 321 U/L (ref 10–68)
ANION GAP SERPL CALC-SCNC: 16.8 MMOL/L (ref 8–16)
BILIRUB SERPL-MCNC: 0.5 MG/DL (ref 0.2–1.3)
BUN SERPL-MCNC: 77 MG/DL (ref 7–18)
CALCIUM SERPL-MCNC: 8.6 MG/DL (ref 8.5–10.1)
CHLORIDE SERPL-SCNC: 95 MMOL/L (ref 98–107)
CK MB SERPL-MCNC: 3.6 U/L (ref 0–3.6)
CK SERPL-CCNC: 1946 UL (ref 21–215)
CO2 SERPL-SCNC: 24.1 MMOL/L (ref 21–32)
CREAT SERPL-MCNC: 1.3 MG/DL (ref 0.6–1.3)
ERYTHROCYTE [DISTWIDTH] IN BLOOD BY AUTOMATED COUNT: 14.2 % (ref 11.5–14.5)
ERYTHROCYTE [DISTWIDTH] IN BLOOD BY AUTOMATED COUNT: 14.5 % (ref 11.5–14.5)
GLOBULIN SER-MCNC: 4.5 G/L
GLUCOSE SERPL-MCNC: 199 MG/DL (ref 74–106)
HCT VFR BLD CALC: 30 % (ref 36–48)
HCT VFR BLD CALC: 30.1 % (ref 36–48)
HGB BLD-MCNC: 10.1 G/DL (ref 12–16)
HGB BLD-MCNC: 9.8 G/DL (ref 12–16)
LYMPHOCYTES NFR BLD AUTO: 6 % (ref 15–50)
MAGNESIUM SERPL-MCNC: 4 MG/DL (ref 1.8–2.4)
MCH RBC QN AUTO: 29.5 PG (ref 26–34)
MCH RBC QN AUTO: 29.5 PG (ref 26–34)
MCHC RBC AUTO-ENTMCNC: 32.6 G/DL (ref 31–37)
MCHC RBC AUTO-ENTMCNC: 33.7 G/DL (ref 31–37)
MCV RBC: 87.7 FL (ref 80–100)
MCV RBC: 90.7 FL (ref 80–100)
MONOCYTES NFR BLD: 4 % (ref 2–11)
NEUTROPHILS NFR BLD AUTO: 73 % (ref 40–80)
OSMOLALITY SERPL CALC.SUM OF ELEC: 293 MOSM/KG (ref 275–300)
PHOSPHATE SERPL-MCNC: 3.4 MG/DL (ref 2.5–4.9)
PLATELET # BLD EST: NORMAL 10*3/UL
PLATELET # BLD: 245 10X3/UL (ref 130–400)
PLATELET # BLD: 245 10X3/UL (ref 130–400)
PMV BLD AUTO: 10.3 FL (ref 7.4–10.4)
PMV BLD AUTO: 10.7 FL (ref 7.4–10.4)
POTASSIUM SERPL-SCNC: 3.9 MMOL/L (ref 3.5–5.1)
PROT SERPL-MCNC: 6.6 G/DL (ref 6.4–8.2)
RBC # BLD AUTO: 3.32 10X6/UL (ref 4–5.4)
RBC # BLD AUTO: 3.42 10X6/UL (ref 4–5.4)
SODIUM SERPL-SCNC: 132 MMOL/L (ref 136–145)
TOXIC GRANULES BLD QL SMEAR: (no result)
WBC # BLD AUTO: 20.6 10X3/UL (ref 4.8–10.8)
WBC # BLD AUTO: 22.5 10X3/UL (ref 4.8–10.8)

## 2017-11-23 PROCEDURE — 0D9670Z DRAINAGE OF STOMACH WITH DRAINAGE DEVICE, VIA NATURAL OR ARTIFICIAL OPENING: ICD-10-PCS | Performed by: THORACIC SURGERY (CARDIOTHORACIC VASCULAR SURGERY)

## 2017-11-23 NOTE — NUR
PT LEAVING UNIT FOR RADIOLOGY FOR ORDERED PA AND LAT CXR. ON 15L OXIMIZER VIA
W/C AND WITH HOSPITAL STAFF X 3.

## 2017-11-23 NOTE — NUR
PT RETURNED TO UNIT RECONNECTED TO MONITORS. WAS TAKEN ON 15L OXIMIZER AND
SP02 REMAINED IN MID 90'S PT VERY TIRED SO RECONNECTED BIPAP AT 50%. NOTE SOME
ATRIAL PACING AT THIS TIME

## 2017-11-23 NOTE — NUR
PHYSICAL THERAPY AT BEDSIDE. ASSISTED TO RECLINER WITH MAX ASSIST. CALL LIGHT
PLACED IN REACH. SOME RESP DECOMPENSTATION NOTED WITH EXERTION. WILL CONT TO
MONITOR. CALL LIGHT IN REACH.

## 2017-11-23 NOTE — NUR
REASSESSMENT COMPLETE PER FLOWSHEET. PT REMAINS IN THE RECLINER.  AT
[facility]. AFEBRILE. VSS. WILL CONT TO ASSESS.

## 2017-11-23 NOTE — NUR
ASSISTED BACK TO BED FROM RECLINER. WITH THE ASSITANCE OF ANOTHER PERSON. PT
SHUFFLING AND UNSTEADY GAIT NOTED. REPOSITIONED FOR COMFORT IN BED. ASSISTED
WITH ORAL CARE. BIAP MASK PLACED BACK ON. VSS.

## 2017-11-23 NOTE — NUR
REPORT RECIEVED FROM NIGHT SHIFT NURSE. PT RESTING IN BED QUIETLY ON BIPAP AT
50%. FULL ASSESSMENT COMPLETE PER FLOWSHEET. REFER FOR DETAILS. TPM WIRES
INTACT AND SECURED UNDERNEATH DRESSING. TPM DDD 90- V-PACING/ATRAIL-SENSING.
HR . VSS. CALL LIGHT IN REACH. BED IN LOW POSITION. WILL CONT TO ASSESS.

## 2017-11-23 NOTE — NUR
REASSESSMENT COMPLETED. SEE FLOWSHEET FOR ALL
FINDINGS. LETHARGIC AND CONFUSED ON BIPAP. RESP SHALLOW. LUNG SOUNDS DIM THRU
OUT. V PACED ON THE MONITOR. TEMP PM INTACT AT DDD 90. ATRIAL SENSING.
PULSES PALP,. STERNAL DRESSINGS CDI. ABD DISTENDED WITH HYPO CSX4. 16FR NGT TO
RIGHT NAREPATENT AND SECURED TO LIWS. F/C PATENT WITH CONCENTRATED UOP.
AFEBRILE. TURNED AND REPOSITIONED. DENEROL PCA IN USE FOR PAIN CONTROL. HOB
UP. BED ALARM ON. CONT CURRENT POC.

## 2017-11-23 NOTE — NUR
REASSESSMENT COMPLETE PER FLOWSHEET. CALL LIGHT IN REACH. NO CHANGES NOTED AT
THIS TIME. REMIANS IN RECLINER. WILL MONITOR.

## 2017-11-23 NOTE — NUR
REPORT RECVD. CARE ASSUMED. INITIAL ASSMNT COMPLETED. SEE FLOWSHEET FOR ALL
FINDINGS. LETHARGIC AND CONFUSED ON BIPAP. RESP SHALLOW. LUNG SOUNDS DIM THRU
OUT. V PACED ON THE MONITOR. TEMP PM INTACT AT DDD 90. ATRIAL SENSING.
PULSES PALP,. STERNAL DRESSINGS CDI. ABD DISTENDED WITH HYPO CSX4. 16FR NGT TO
RIGHT NAREPATENT AND SECURED TO LIWS. F/C PATENT WITH CONCENTRATED UOP.
AFEBRILE. TURNED AND REPOSITIONED. DENEROL PCA IN USE FOR PAIN CONTROL. HOB
UP. BED ALARM ON. CONT CURRENT POC.

## 2017-11-23 NOTE — NUR
DR. WINTERS NOTIFIED OF RHYTHM CHANGE OF SOME ATRIAL PACING IN PAST HOUR. PT
BEING TREATED FOR K+ 3.3 WITH 10MEQ K+ PER ORDER. NO NEW ORDERS

## 2017-11-23 NOTE — NUR
16F NGT PLACED IN R NARE. PLACEMENT VERIFIED VIA AUSCULTATION. 1200CC OF BILE
IMMEDIATLY NOTED TO CANISTER. DR. WINTERS AT BEDSIDE. STATED TO HOLD PO
MEDICATIONS AT THIS TIME.

## 2017-11-24 VITALS — SYSTOLIC BLOOD PRESSURE: 110 MMHG | DIASTOLIC BLOOD PRESSURE: 65 MMHG

## 2017-11-24 VITALS — DIASTOLIC BLOOD PRESSURE: 55 MMHG | SYSTOLIC BLOOD PRESSURE: 117 MMHG

## 2017-11-24 VITALS — SYSTOLIC BLOOD PRESSURE: 122 MMHG | DIASTOLIC BLOOD PRESSURE: 72 MMHG

## 2017-11-24 VITALS — SYSTOLIC BLOOD PRESSURE: 108 MMHG | DIASTOLIC BLOOD PRESSURE: 66 MMHG

## 2017-11-24 VITALS — SYSTOLIC BLOOD PRESSURE: 98 MMHG | DIASTOLIC BLOOD PRESSURE: 56 MMHG

## 2017-11-24 VITALS — DIASTOLIC BLOOD PRESSURE: 68 MMHG | SYSTOLIC BLOOD PRESSURE: 137 MMHG

## 2017-11-24 VITALS — DIASTOLIC BLOOD PRESSURE: 72 MMHG | SYSTOLIC BLOOD PRESSURE: 140 MMHG

## 2017-11-24 VITALS — SYSTOLIC BLOOD PRESSURE: 127 MMHG | DIASTOLIC BLOOD PRESSURE: 69 MMHG

## 2017-11-24 VITALS — DIASTOLIC BLOOD PRESSURE: 76 MMHG | SYSTOLIC BLOOD PRESSURE: 127 MMHG

## 2017-11-24 VITALS — DIASTOLIC BLOOD PRESSURE: 67 MMHG | SYSTOLIC BLOOD PRESSURE: 100 MMHG

## 2017-11-24 VITALS — SYSTOLIC BLOOD PRESSURE: 107 MMHG | DIASTOLIC BLOOD PRESSURE: 65 MMHG

## 2017-11-24 VITALS — DIASTOLIC BLOOD PRESSURE: 78 MMHG | SYSTOLIC BLOOD PRESSURE: 132 MMHG

## 2017-11-24 VITALS — DIASTOLIC BLOOD PRESSURE: 73 MMHG | SYSTOLIC BLOOD PRESSURE: 135 MMHG

## 2017-11-24 VITALS — SYSTOLIC BLOOD PRESSURE: 100 MMHG | DIASTOLIC BLOOD PRESSURE: 58 MMHG

## 2017-11-24 VITALS — SYSTOLIC BLOOD PRESSURE: 125 MMHG | DIASTOLIC BLOOD PRESSURE: 76 MMHG

## 2017-11-24 VITALS — SYSTOLIC BLOOD PRESSURE: 144 MMHG | DIASTOLIC BLOOD PRESSURE: 70 MMHG

## 2017-11-24 VITALS — SYSTOLIC BLOOD PRESSURE: 126 MMHG | DIASTOLIC BLOOD PRESSURE: 77 MMHG

## 2017-11-24 VITALS — SYSTOLIC BLOOD PRESSURE: 151 MMHG | DIASTOLIC BLOOD PRESSURE: 78 MMHG

## 2017-11-24 VITALS — SYSTOLIC BLOOD PRESSURE: 116 MMHG | DIASTOLIC BLOOD PRESSURE: 61 MMHG

## 2017-11-24 VITALS — SYSTOLIC BLOOD PRESSURE: 104 MMHG | DIASTOLIC BLOOD PRESSURE: 63 MMHG

## 2017-11-24 LAB
ALBUMIN SERPL-MCNC: 1.9 G/DL (ref 3.4–5)
ALP SERPL-CCNC: 265 U/L (ref 46–116)
ALT SERPL-CCNC: 225 U/L (ref 10–68)
ANION GAP SERPL CALC-SCNC: 18.4 MMOL/L (ref 8–16)
BILIRUB SERPL-MCNC: 0.6 MG/DL (ref 0.2–1.3)
BUN SERPL-MCNC: 86 MG/DL (ref 7–18)
CALCIUM SERPL-MCNC: 8.4 MG/DL (ref 8.5–10.1)
CHLORIDE SERPL-SCNC: 96 MMOL/L (ref 98–107)
CO2 SERPL-SCNC: 22 MMOL/L (ref 21–32)
CREAT SERPL-MCNC: 1.4 MG/DL (ref 0.6–1.3)
ERYTHROCYTE [DISTWIDTH] IN BLOOD BY AUTOMATED COUNT: 14.6 % (ref 11.5–14.5)
GLOBULIN SER-MCNC: 4.7 G/L
GLUCOSE SERPL-MCNC: 182 MG/DL (ref 74–106)
HCT VFR BLD CALC: 30.4 % (ref 36–48)
HGB BLD-MCNC: 10.1 G/DL (ref 12–16)
LIPASE SERPL-CCNC: 63 U/L (ref 73–393)
MCH RBC QN AUTO: 29.8 PG (ref 26–34)
MCHC RBC AUTO-ENTMCNC: 33.2 G/DL (ref 31–37)
MCV RBC: 89.7 FL (ref 80–100)
OSMOLALITY SERPL CALC.SUM OF ELEC: 295 MOSM/KG (ref 275–300)
PLATELET # BLD: 239 10X3/UL (ref 130–400)
PMV BLD AUTO: 9.9 FL (ref 7.4–10.4)
POTASSIUM SERPL-SCNC: 4.4 MMOL/L (ref 3.5–5.1)
PROT SERPL-MCNC: 6.6 G/DL (ref 6.4–8.2)
RBC # BLD AUTO: 3.39 10X6/UL (ref 4–5.4)
SODIUM SERPL-SCNC: 132 MMOL/L (ref 136–145)
VANCOMYCIN SERPL-MCNC: 37.6 UG/ML (ref 10–20)
WBC # BLD AUTO: 26.3 10X3/UL (ref 4.8–10.8)

## 2017-11-24 PROCEDURE — 02H633Z INSERTION OF INFUSION DEVICE INTO RIGHT ATRIUM, PERCUTANEOUS APPROACH: ICD-10-PCS | Performed by: ANESTHESIOLOGY

## 2017-11-24 PROCEDURE — B244ZZZ ULTRASONOGRAPHY OF RIGHT HEART: ICD-10-PCS | Performed by: ANESTHESIOLOGY

## 2017-11-24 NOTE — NUR
TURNED AND REPOSITIONED. REMAINS ON BIPAP. VSS. ST ON THE MONITOR, AFEBRILE.
HOB UP. C/L IN REACH. CONT CURRENT POC.

## 2017-11-24 NOTE — NUR
REPORT RECVD. CARE ASSUMED. INITIAL ASSMNT COMPLETED. SEE FLOWSHEET FOR ALL
FINDINGS. LETHARGIC BUT MORE ALERT. CONFUSED TO TIME. VOICING NEEDS.
SPO2 97% ON OXYMIZER AT 4LPM. RESP SHALLOW. LUNG SOUNDS DIM THRU
OUT. V PACED ON THE MONITOR. TEMP PM INTACT AT DDD 90. ATRIAL SENSING.
PULSES PALP,. STERNAL DRESSINGS CDI. ABD DISTENDED WITH HYPO BSX4. 16FR NGT TO
RIGHT NAREPATENT AND SECURED TO LIWS. BROWNISH BILE TO CANISTER.
PASSING FLATUS. F/C PATENT WITH CONCENTRATED UOP.
AFEBRILE. TURNED AND REPOSITIONED. DENEROL PCA IN USE FOR PAIN CONTROL. HOB
UP.C/L AND PCA IN REACH. BED ALARM ON. CONT CURRENT POC.

## 2017-11-24 NOTE — NUR
TURNED AND REPOSITIONED. AWAKE AND ALERT. REMAINS ON OXUMIZER. VSS. ICE CHIPS
GIVEN. PCA IN USE FOR PAIN CONTROL. HOB UP. C/L IN REACH. CONT CURRENT POC.

## 2017-11-24 NOTE — NUR
REPOSITIONED FOR COMOFORT. FACE FLUSHED. TEMP CHECKED AND PT IS AFEBRILE. COOL
WASH RAG PLACED ON FOREHEAD. STATES SHE IS HAVING SOME DISCOMFORT IN HER BACK.
PCA IN REACH. WILL CONT TO MONITOR. VSS.

## 2017-11-24 NOTE — NUR
NUTRITION F/U
CHART REVIEWED. PT REMAINS ON BIPAP, NOW NPO WITH NG TUBE TO SUCTION.
WILL CONTINUE TO MONITOR PT PROGRESS. ASSIST WITH NUTRITION SUPPORT IF NEEDED.
RD FOLLOWING

## 2017-11-24 NOTE — NUR
TURNED AND REPOSITIONED. REMAINS CONFUSED AND LETHARGIC.
VSS. TEMP PM V PACING. SPO2 96% ON BIPAP.
HOB UP. C/L IN REACH. BED ALARM ON. CONT CURRENT POC.

## 2017-11-24 NOTE — NUR
REASSESSMENT COMPLETED. SEE FLOWSHEET FOR ALL
FINDINGS. LETHARGIC AND CONFUSED ON BIPAP. RESP SHALLOW. LUNG SOUNDS DIM THR
OUT. V PACED ON THE MONITOR. TEMP PM INTACT AT DDD 90. ATRIAL SENSING.
PULSES PALP,. STERNAL DRESSINGS CDI. ABD DISTENDED WITH HYPO CSX4. 16FR NGT
RIGHT NAREPATENT AND SECURED TO LIWS. F/C PATENT WITH CONCENTRATED UOP.
AFEBRILE. TURNED AND REPOSITIONED. DENEROL PCA IN USE FOR PAIN CONTROL. HOB
UP. BED ALARM ON. CONT CURRENT POC.

## 2017-11-25 VITALS — SYSTOLIC BLOOD PRESSURE: 135 MMHG | DIASTOLIC BLOOD PRESSURE: 80 MMHG

## 2017-11-25 VITALS — DIASTOLIC BLOOD PRESSURE: 72 MMHG | SYSTOLIC BLOOD PRESSURE: 108 MMHG

## 2017-11-25 VITALS — SYSTOLIC BLOOD PRESSURE: 100 MMHG | DIASTOLIC BLOOD PRESSURE: 72 MMHG

## 2017-11-25 VITALS — DIASTOLIC BLOOD PRESSURE: 74 MMHG | SYSTOLIC BLOOD PRESSURE: 111 MMHG

## 2017-11-25 VITALS — SYSTOLIC BLOOD PRESSURE: 156 MMHG | DIASTOLIC BLOOD PRESSURE: 80 MMHG

## 2017-11-25 VITALS — SYSTOLIC BLOOD PRESSURE: 99 MMHG | DIASTOLIC BLOOD PRESSURE: 71 MMHG

## 2017-11-25 VITALS — SYSTOLIC BLOOD PRESSURE: 136 MMHG | DIASTOLIC BLOOD PRESSURE: 75 MMHG

## 2017-11-25 VITALS — SYSTOLIC BLOOD PRESSURE: 96 MMHG | DIASTOLIC BLOOD PRESSURE: 56 MMHG

## 2017-11-25 VITALS — SYSTOLIC BLOOD PRESSURE: 137 MMHG | DIASTOLIC BLOOD PRESSURE: 78 MMHG

## 2017-11-25 VITALS — DIASTOLIC BLOOD PRESSURE: 72 MMHG | SYSTOLIC BLOOD PRESSURE: 113 MMHG

## 2017-11-25 VITALS — DIASTOLIC BLOOD PRESSURE: 83 MMHG | SYSTOLIC BLOOD PRESSURE: 163 MMHG

## 2017-11-25 VITALS — DIASTOLIC BLOOD PRESSURE: 56 MMHG | SYSTOLIC BLOOD PRESSURE: 99 MMHG

## 2017-11-25 VITALS — SYSTOLIC BLOOD PRESSURE: 124 MMHG | DIASTOLIC BLOOD PRESSURE: 66 MMHG

## 2017-11-25 VITALS — SYSTOLIC BLOOD PRESSURE: 156 MMHG | DIASTOLIC BLOOD PRESSURE: 78 MMHG

## 2017-11-25 VITALS — SYSTOLIC BLOOD PRESSURE: 137 MMHG | DIASTOLIC BLOOD PRESSURE: 69 MMHG

## 2017-11-25 VITALS — SYSTOLIC BLOOD PRESSURE: 138 MMHG | DIASTOLIC BLOOD PRESSURE: 68 MMHG

## 2017-11-25 VITALS — DIASTOLIC BLOOD PRESSURE: 72 MMHG | SYSTOLIC BLOOD PRESSURE: 147 MMHG

## 2017-11-25 VITALS — SYSTOLIC BLOOD PRESSURE: 115 MMHG | DIASTOLIC BLOOD PRESSURE: 73 MMHG

## 2017-11-25 VITALS — SYSTOLIC BLOOD PRESSURE: 110 MMHG | DIASTOLIC BLOOD PRESSURE: 74 MMHG

## 2017-11-25 VITALS — SYSTOLIC BLOOD PRESSURE: 146 MMHG | DIASTOLIC BLOOD PRESSURE: 77 MMHG

## 2017-11-25 VITALS — DIASTOLIC BLOOD PRESSURE: 80 MMHG | SYSTOLIC BLOOD PRESSURE: 135 MMHG

## 2017-11-25 VITALS — SYSTOLIC BLOOD PRESSURE: 121 MMHG | DIASTOLIC BLOOD PRESSURE: 72 MMHG

## 2017-11-25 VITALS — DIASTOLIC BLOOD PRESSURE: 46 MMHG | SYSTOLIC BLOOD PRESSURE: 92 MMHG

## 2017-11-25 LAB
ALBUMIN SERPL-MCNC: 1.6 G/DL (ref 3.4–5)
ALP SERPL-CCNC: 202 U/L (ref 46–116)
ALT SERPL-CCNC: 134 U/L (ref 10–68)
ANION GAP SERPL CALC-SCNC: 14.8 MMOL/L (ref 8–16)
BILIRUB SERPL-MCNC: 0.46 MG/DL (ref 0.2–1.3)
BUN SERPL-MCNC: 69 MG/DL (ref 7–18)
CALCIUM SERPL-MCNC: 8 MG/DL (ref 8.5–10.1)
CHLORIDE SERPL-SCNC: 100 MMOL/L (ref 98–107)
CO2 SERPL-SCNC: 25.3 MMOL/L (ref 21–32)
CREAT SERPL-MCNC: 0.9 MG/DL (ref 0.6–1.3)
ERYTHROCYTE [DISTWIDTH] IN BLOOD BY AUTOMATED COUNT: 14.7 % (ref 11.5–14.5)
GLOBULIN SER-MCNC: 4.7 G/L
GLUCOSE SERPL-MCNC: 213 MG/DL (ref 74–106)
HCT VFR BLD CALC: 27.1 % (ref 36–48)
HGB BLD-MCNC: 8.8 G/DL (ref 12–16)
MCH RBC QN AUTO: 29.6 PG (ref 26–34)
MCHC RBC AUTO-ENTMCNC: 32.5 G/DL (ref 31–37)
MCV RBC: 91.2 FL (ref 80–100)
OSMOLALITY SERPL CALC.SUM OF ELEC: 297 MOSM/KG (ref 275–300)
PLATELET # BLD: 251 10X3/UL (ref 130–400)
PMV BLD AUTO: 9.4 FL (ref 7.4–10.4)
POTASSIUM SERPL-SCNC: 4.1 MMOL/L (ref 3.5–5.1)
PROT SERPL-MCNC: 6.3 G/DL (ref 6.4–8.2)
RBC # BLD AUTO: 2.97 10X6/UL (ref 4–5.4)
SODIUM SERPL-SCNC: 136 MMOL/L (ref 136–145)
VANCOMYCIN SERPL-MCNC: 9.9 UG/ML (ref 10–20)
WBC # BLD AUTO: 19.3 10X3/UL (ref 4.8–10.8)

## 2017-11-25 NOTE — NUR
EVI COCHRAN AND ALE NOTIFIED THAT DESPITE PT'S REPORT OF HAVING MULTIPLE
BOWEL MOVEMENTS LAST DOCUMENTED BM WAS ON 11/22/17.

## 2017-11-25 NOTE — NUR
REPORT RECVD. CARE ASSUMED. INITIAL ASSMNT COMPLETED. SEE FLOWSHEET FOR ALL
FINDINGS. AWAKE AND AOX4.  VOICING NEEDS.
SPO2 94% ON O2 AT 4LPM NC. RESP SHALLOW. LUNG SOUNDS DIM THRU
OUT. V PACED ON THE MONITOR. TEMP PM INTACT AT DDD 90. ATRIAL SENSING.
PULSES PALP,. STERNAL DRESSINGS CDI. ABD DISTENDED WITH HYPO BSX4. 16FR NGT
RIGHT NAREPATENT AND SECURED TO LIWS. BROWNISH BILE TO CANISTER.
PASSING FLATUS. F/C PATENT WITH CONCENTRATED UOP.
AFEBRILE. TURNED AND REPOSITIONED. DEMEROL PCA IN USE FOR PAIN CONTROL. HOB
UP.C/L AND PCA IN REACH. BED ALARM ON. CONT CURRENT POC.

## 2017-11-25 NOTE — NUR
ASSISTED TO REPOSITION FOR COMFORT. VSS. PCA IN USE. TAKING ICE CHIPS. HOB UP.
C/L IN REACH. CONT CURRENT POC.

## 2017-11-25 NOTE — NUR
REASSESSMENT COMPLETED. SEE FLOWSHEET FOR ALL
FINDINGS. LETHARGIC AND CONFUSED ON BIPAP. RESP SHALLOW. LUNG SOUNDS DIM THRU
OUT. V PACED ON THE MONITOR. TEMP PM INTACT AT DDD 90. ATRIAL SENSING.
PULSES PALP,. STERNAL DRESSINGS CDI. ABD DISTENDED WITH HYPO CSX4. 16FR NGT T
RIGHT NAREPATENT AND SECURED TO LIWS. F/C PATENT WITH CONCENTRATED UOP.
AFEBRILE. TURNED AND REPOSITIONED. DENEROL PCA IN USE FOR PAIN CONTROL. HOB
UP. BED ALARM ON. CONT CURRENT POC.

## 2017-11-25 NOTE — NUR
REASSESSMENT COMPLETED. SEE FLOWSHEET FOR ALL
FINDINGS. RESTING ON BIPAP. RESP SHALLOW. LUNG SOUNDS DIM THRU
OUT. V PACED ON THE MONITOR. TEMP PM INTACT AT DDD 90. ATRIAL SENSING.
PULSES PALP,. STERNAL DRESSINGS CDI. ABD DISTENDED WITH HYPO CSX4. 16FR NGT T
RIGHT NAREPATENT AND SECURED TO LIWS. F/C PATENT WITH CONCENTRATED UOP.
AFEBRILE. TURNED AND REPOSITIONED. DEMEROL PCA IN USE FOR PAIN CONTROL. HOB
UP. BED ALARM ON. CONT CURRENT POC.

## 2017-11-25 NOTE — NUR
ESCORTED PT TO PA AND LATERAL XRAY VIA CHAIR.  BACK TO ROOM.  TOLERATED WELL.
REPOSITIONED IN CHAIR.  PCA IN HAND.  C/L IN REACH.

## 2017-11-25 NOTE — NUR
TURNED AND REPOSITIONED. RESTING ON BIPAP. VSS. V PACED ON THE MONITOR. PCA IN
USE AND IN REACH. CONT CURRENT POC.

## 2017-11-26 VITALS — SYSTOLIC BLOOD PRESSURE: 139 MMHG | DIASTOLIC BLOOD PRESSURE: 71 MMHG

## 2017-11-26 VITALS — DIASTOLIC BLOOD PRESSURE: 84 MMHG | SYSTOLIC BLOOD PRESSURE: 136 MMHG

## 2017-11-26 VITALS — DIASTOLIC BLOOD PRESSURE: 78 MMHG | SYSTOLIC BLOOD PRESSURE: 151 MMHG

## 2017-11-26 VITALS — DIASTOLIC BLOOD PRESSURE: 75 MMHG | SYSTOLIC BLOOD PRESSURE: 142 MMHG

## 2017-11-26 VITALS — SYSTOLIC BLOOD PRESSURE: 155 MMHG | DIASTOLIC BLOOD PRESSURE: 83 MMHG

## 2017-11-26 VITALS — DIASTOLIC BLOOD PRESSURE: 90 MMHG | SYSTOLIC BLOOD PRESSURE: 145 MMHG

## 2017-11-26 VITALS — SYSTOLIC BLOOD PRESSURE: 146 MMHG | DIASTOLIC BLOOD PRESSURE: 82 MMHG

## 2017-11-26 VITALS — DIASTOLIC BLOOD PRESSURE: 89 MMHG | SYSTOLIC BLOOD PRESSURE: 146 MMHG

## 2017-11-26 VITALS — SYSTOLIC BLOOD PRESSURE: 140 MMHG | DIASTOLIC BLOOD PRESSURE: 70 MMHG

## 2017-11-26 VITALS — DIASTOLIC BLOOD PRESSURE: 72 MMHG | SYSTOLIC BLOOD PRESSURE: 135 MMHG

## 2017-11-26 VITALS — DIASTOLIC BLOOD PRESSURE: 76 MMHG | SYSTOLIC BLOOD PRESSURE: 145 MMHG

## 2017-11-26 VITALS — SYSTOLIC BLOOD PRESSURE: 144 MMHG | DIASTOLIC BLOOD PRESSURE: 77 MMHG

## 2017-11-26 VITALS — SYSTOLIC BLOOD PRESSURE: 136 MMHG | DIASTOLIC BLOOD PRESSURE: 82 MMHG

## 2017-11-26 VITALS — SYSTOLIC BLOOD PRESSURE: 112 MMHG | DIASTOLIC BLOOD PRESSURE: 79 MMHG

## 2017-11-26 VITALS — SYSTOLIC BLOOD PRESSURE: 129 MMHG | DIASTOLIC BLOOD PRESSURE: 68 MMHG

## 2017-11-26 VITALS — DIASTOLIC BLOOD PRESSURE: 80 MMHG | SYSTOLIC BLOOD PRESSURE: 136 MMHG

## 2017-11-26 VITALS — SYSTOLIC BLOOD PRESSURE: 143 MMHG | DIASTOLIC BLOOD PRESSURE: 78 MMHG

## 2017-11-26 VITALS — DIASTOLIC BLOOD PRESSURE: 87 MMHG | SYSTOLIC BLOOD PRESSURE: 155 MMHG

## 2017-11-26 VITALS — DIASTOLIC BLOOD PRESSURE: 75 MMHG | SYSTOLIC BLOOD PRESSURE: 137 MMHG

## 2017-11-26 VITALS — DIASTOLIC BLOOD PRESSURE: 72 MMHG | SYSTOLIC BLOOD PRESSURE: 138 MMHG

## 2017-11-26 VITALS — SYSTOLIC BLOOD PRESSURE: 136 MMHG | DIASTOLIC BLOOD PRESSURE: 80 MMHG

## 2017-11-26 VITALS — DIASTOLIC BLOOD PRESSURE: 79 MMHG | SYSTOLIC BLOOD PRESSURE: 127 MMHG

## 2017-11-26 VITALS — DIASTOLIC BLOOD PRESSURE: 81 MMHG | SYSTOLIC BLOOD PRESSURE: 153 MMHG

## 2017-11-26 LAB
ALBUMIN SERPL-MCNC: 1.6 G/DL (ref 3.4–5)
ALP SERPL-CCNC: 165 U/L (ref 46–116)
ALT SERPL-CCNC: 97 U/L (ref 10–68)
ANION GAP SERPL CALC-SCNC: 14.5 MMOL/L (ref 8–16)
BILIRUB SERPL-MCNC: 0.44 MG/DL (ref 0.2–1.3)
BUN SERPL-MCNC: 50 MG/DL (ref 7–18)
CALCIUM SERPL-MCNC: 7.9 MG/DL (ref 8.5–10.1)
CHLORIDE SERPL-SCNC: 103 MMOL/L (ref 98–107)
CO2 SERPL-SCNC: 25.8 MMOL/L (ref 21–32)
CREAT SERPL-MCNC: 0.7 MG/DL (ref 0.6–1.3)
ERYTHROCYTE [DISTWIDTH] IN BLOOD BY AUTOMATED COUNT: 15.1 % (ref 11.5–14.5)
GLOBULIN SER-MCNC: 4.8 G/L
GLUCOSE SERPL-MCNC: 241 MG/DL (ref 74–106)
HCT VFR BLD CALC: 28 % (ref 36–48)
HGB BLD-MCNC: 8.9 G/DL (ref 12–16)
MCH RBC QN AUTO: 29.7 PG (ref 26–34)
MCHC RBC AUTO-ENTMCNC: 31.8 G/DL (ref 31–37)
MCV RBC: 93.3 FL (ref 80–100)
OSMOLALITY SERPL CALC.SUM OF ELEC: 298 MOSM/KG (ref 275–300)
PLATELET # BLD: 269 10X3/UL (ref 130–400)
PMV BLD AUTO: 9.2 FL (ref 7.4–10.4)
POTASSIUM SERPL-SCNC: 4.3 MMOL/L (ref 3.5–5.1)
PROT SERPL-MCNC: 6.4 G/DL (ref 6.4–8.2)
RBC # BLD AUTO: 3 10X6/UL (ref 4–5.4)
SODIUM SERPL-SCNC: 139 MMOL/L (ref 136–145)
VANCOMYCIN SERPL-MCNC: 11.7 UG/ML (ref 10–20)
WBC # BLD AUTO: 12.9 10X3/UL (ref 4.8–10.8)

## 2017-11-26 NOTE — NUR
PT REQUESTED SOME ICE CHIPS. GRANTED HER REQUEST AND POSITIONED FOR COMFORT,
WILL CONTINUE TO MONITOR.

## 2017-11-26 NOTE — NUR
RETURNED FROM XRAY VIA W/C. VSS. POSITIONED IN BED FOR COMFORT. PLACED BACK ON
BIPAP. HOB UP. C/L AND PCA IN REACH. CONT CURRENT POC.

## 2017-11-26 NOTE — NUR
RESTING ON BIPAP. VSS. REPOSITIONED IN BED. DENIES NEEDS. PCA DEMEROL
PROVIDING PAIN CONTROL. HOB UP. C/L IN REACH. CONT CURRENT POC.

## 2017-11-26 NOTE — NUR
REPORT RECIEVED. ASSESSMENT COMPLETED. SEE FLOW SHWEET FOR FURTHER DETAILS. PT
RESTING IN BED WITH NO SIGNS OF DISTRESS. PT POSITIONED FOR COMFORT WILL
CONTINUE TO MONITOR PT.

## 2017-11-26 NOTE — NUR
REASSESSMENT COMPLTED AT THIS TIME.NO ACUTE CHANGES. SEE FLOW SHEET FOR
DETAILS. PT POSITIONED FOR COMFORT AND GAVE A CUP OF ICE CHIPS. WILL CONTINUE
TO MONITOR PT.

## 2017-11-27 VITALS — DIASTOLIC BLOOD PRESSURE: 69 MMHG | SYSTOLIC BLOOD PRESSURE: 147 MMHG

## 2017-11-27 VITALS — SYSTOLIC BLOOD PRESSURE: 146 MMHG | DIASTOLIC BLOOD PRESSURE: 89 MMHG

## 2017-11-27 VITALS — SYSTOLIC BLOOD PRESSURE: 122 MMHG | DIASTOLIC BLOOD PRESSURE: 77 MMHG

## 2017-11-27 VITALS — DIASTOLIC BLOOD PRESSURE: 53 MMHG | SYSTOLIC BLOOD PRESSURE: 151 MMHG

## 2017-11-27 VITALS — DIASTOLIC BLOOD PRESSURE: 89 MMHG | SYSTOLIC BLOOD PRESSURE: 159 MMHG

## 2017-11-27 VITALS — SYSTOLIC BLOOD PRESSURE: 131 MMHG | DIASTOLIC BLOOD PRESSURE: 82 MMHG

## 2017-11-27 VITALS — SYSTOLIC BLOOD PRESSURE: 142 MMHG | DIASTOLIC BLOOD PRESSURE: 86 MMHG

## 2017-11-27 VITALS — SYSTOLIC BLOOD PRESSURE: 143 MMHG | DIASTOLIC BLOOD PRESSURE: 74 MMHG

## 2017-11-27 VITALS — DIASTOLIC BLOOD PRESSURE: 69 MMHG | SYSTOLIC BLOOD PRESSURE: 114 MMHG

## 2017-11-27 VITALS — DIASTOLIC BLOOD PRESSURE: 90 MMHG | SYSTOLIC BLOOD PRESSURE: 146 MMHG

## 2017-11-27 VITALS — DIASTOLIC BLOOD PRESSURE: 89 MMHG | SYSTOLIC BLOOD PRESSURE: 166 MMHG

## 2017-11-27 VITALS — SYSTOLIC BLOOD PRESSURE: 141 MMHG | DIASTOLIC BLOOD PRESSURE: 87 MMHG

## 2017-11-27 VITALS — SYSTOLIC BLOOD PRESSURE: 145 MMHG | DIASTOLIC BLOOD PRESSURE: 89 MMHG

## 2017-11-27 VITALS — DIASTOLIC BLOOD PRESSURE: 87 MMHG | SYSTOLIC BLOOD PRESSURE: 156 MMHG

## 2017-11-27 VITALS — DIASTOLIC BLOOD PRESSURE: 91 MMHG | SYSTOLIC BLOOD PRESSURE: 150 MMHG

## 2017-11-27 VITALS — DIASTOLIC BLOOD PRESSURE: 79 MMHG | SYSTOLIC BLOOD PRESSURE: 141 MMHG

## 2017-11-27 VITALS — SYSTOLIC BLOOD PRESSURE: 151 MMHG | DIASTOLIC BLOOD PRESSURE: 86 MMHG

## 2017-11-27 VITALS — DIASTOLIC BLOOD PRESSURE: 76 MMHG | SYSTOLIC BLOOD PRESSURE: 141 MMHG

## 2017-11-27 VITALS — DIASTOLIC BLOOD PRESSURE: 85 MMHG | SYSTOLIC BLOOD PRESSURE: 144 MMHG

## 2017-11-27 VITALS — SYSTOLIC BLOOD PRESSURE: 136 MMHG | DIASTOLIC BLOOD PRESSURE: 78 MMHG

## 2017-11-27 LAB
ALBUMIN SERPL-MCNC: 1.5 G/DL (ref 3.4–5)
ALP SERPL-CCNC: 138 U/L (ref 46–116)
ALT SERPL-CCNC: 71 U/L (ref 10–68)
ANION GAP SERPL CALC-SCNC: 15.7 MMOL/L (ref 8–16)
BILIRUB SERPL-MCNC: 0.45 MG/DL (ref 0.2–1.3)
BUN SERPL-MCNC: 31 MG/DL (ref 7–18)
CALCIUM SERPL-MCNC: 7.9 MG/DL (ref 8.5–10.1)
CHLORIDE SERPL-SCNC: 101 MMOL/L (ref 98–107)
CO2 SERPL-SCNC: 26.2 MMOL/L (ref 21–32)
CREAT SERPL-MCNC: 0.6 MG/DL (ref 0.6–1.3)
ERYTHROCYTE [DISTWIDTH] IN BLOOD BY AUTOMATED COUNT: 14.9 % (ref 11.5–14.5)
GLOBULIN SER-MCNC: 4.8 G/L
GLUCOSE SERPL-MCNC: 226 MG/DL (ref 74–106)
HCT VFR BLD CALC: 28.2 % (ref 36–48)
HGB BLD-MCNC: 8.8 G/DL (ref 12–16)
MCH RBC QN AUTO: 29.4 PG (ref 26–34)
MCHC RBC AUTO-ENTMCNC: 31.2 G/DL (ref 31–37)
MCV RBC: 94.3 FL (ref 80–100)
OSMOLALITY SERPL CALC.SUM OF ELEC: 291 MOSM/KG (ref 275–300)
PLATELET # BLD: 283 10X3/UL (ref 130–400)
PMV BLD AUTO: 9.4 FL (ref 7.4–10.4)
POTASSIUM SERPL-SCNC: 3.9 MMOL/L (ref 3.5–5.1)
PROT SERPL-MCNC: 6.3 G/DL (ref 6.4–8.2)
RBC # BLD AUTO: 2.99 10X6/UL (ref 4–5.4)
SODIUM SERPL-SCNC: 139 MMOL/L (ref 136–145)
VANCOMYCIN SERPL-MCNC: 7.3 UG/ML (ref 10–20)
WBC # BLD AUTO: 10.9 10X3/UL (ref 4.8–10.8)

## 2017-11-27 NOTE — NUR
PT ASSISTED TO THE BED SIDE COMMODE. PT WAS NO SUCCESSFUL. BRUSHED THE PT HAIR
OUT AND ASSISTED BACK TO BED IN CLEAN LINENS. WILL CONTINUE TO MONITOR PT.

## 2017-11-27 NOTE — NUR
PATIENT SITTING UP IN CHIAR TOLERATING WELL. SPO2 DROPS TO 88-89% WILL
INCREASE WHEN PATIENT TAKES DEEP BREATHS TO 92%. CALL LIGHT WITHIN REACH,
PATIENT DENIES ANY NEEDS.
.

## 2017-11-27 NOTE — NUR
REASSESSMENT COMPLETED. SEE FLOW SHEET FOR FURTHER DETAILS. PT IS POSITIONED
FOR COMFORT WILL CONTINUE TO MONITOR.

## 2017-11-27 NOTE — NUR
PT BACK FROM RADIOLOGY. PT WAS SUCCESSFUL AND STOOD FOR THE XRAYS. CLEAN
LINENS PROVIDED. WILL CONTINUE TO MONITOR PT.

## 2017-11-27 NOTE — NUR
REASSESSMENT COMPLETED. NO ACUTE CHANGES. SEE FLOW SHEET FOR FURTHER DETAILS.
PT POSITIONED FOR COMFORT WILL CONTINUE TO MONITOR.

## 2017-11-27 NOTE — NUR
REPORT RECIEVED. ASSESSMENT COMPLETED. SEE FLOW SHEET FOR DETAILS. PT STOMACH
IS DISTENDED AND FIRM. PT HAS NO VOIDED SINCE HAJI REMOVAL. WILL ASSIST PT
WHEN THE SHE HAS THE URGE TO URINATE.

## 2017-11-27 NOTE — NUR
Nutrition follow-up:
Pt now NPO due to ileus
NGT removed due to ileus resolving
Labs reviewewd
RDN will monitor patients diet advancement, tolerance and progress.

## 2017-11-28 VITALS — SYSTOLIC BLOOD PRESSURE: 131 MMHG | DIASTOLIC BLOOD PRESSURE: 74 MMHG

## 2017-11-28 VITALS — DIASTOLIC BLOOD PRESSURE: 64 MMHG | SYSTOLIC BLOOD PRESSURE: 116 MMHG

## 2017-11-28 VITALS — SYSTOLIC BLOOD PRESSURE: 110 MMHG | DIASTOLIC BLOOD PRESSURE: 65 MMHG

## 2017-11-28 VITALS — SYSTOLIC BLOOD PRESSURE: 115 MMHG | DIASTOLIC BLOOD PRESSURE: 73 MMHG

## 2017-11-28 VITALS — DIASTOLIC BLOOD PRESSURE: 60 MMHG | SYSTOLIC BLOOD PRESSURE: 102 MMHG

## 2017-11-28 VITALS — DIASTOLIC BLOOD PRESSURE: 64 MMHG | SYSTOLIC BLOOD PRESSURE: 103 MMHG

## 2017-11-28 VITALS — SYSTOLIC BLOOD PRESSURE: 110 MMHG | DIASTOLIC BLOOD PRESSURE: 72 MMHG

## 2017-11-28 VITALS — DIASTOLIC BLOOD PRESSURE: 66 MMHG | SYSTOLIC BLOOD PRESSURE: 106 MMHG

## 2017-11-28 VITALS — DIASTOLIC BLOOD PRESSURE: 62 MMHG | SYSTOLIC BLOOD PRESSURE: 94 MMHG

## 2017-11-28 VITALS — DIASTOLIC BLOOD PRESSURE: 70 MMHG | SYSTOLIC BLOOD PRESSURE: 113 MMHG

## 2017-11-28 VITALS — SYSTOLIC BLOOD PRESSURE: 102 MMHG | DIASTOLIC BLOOD PRESSURE: 60 MMHG

## 2017-11-28 VITALS — SYSTOLIC BLOOD PRESSURE: 105 MMHG | DIASTOLIC BLOOD PRESSURE: 52 MMHG

## 2017-11-28 VITALS — SYSTOLIC BLOOD PRESSURE: 117 MMHG | DIASTOLIC BLOOD PRESSURE: 73 MMHG

## 2017-11-28 VITALS — DIASTOLIC BLOOD PRESSURE: 72 MMHG | SYSTOLIC BLOOD PRESSURE: 100 MMHG

## 2017-11-28 VITALS — DIASTOLIC BLOOD PRESSURE: 65 MMHG | SYSTOLIC BLOOD PRESSURE: 116 MMHG

## 2017-11-28 VITALS — SYSTOLIC BLOOD PRESSURE: 120 MMHG | DIASTOLIC BLOOD PRESSURE: 70 MMHG

## 2017-11-28 VITALS — SYSTOLIC BLOOD PRESSURE: 108 MMHG | DIASTOLIC BLOOD PRESSURE: 67 MMHG

## 2017-11-28 VITALS — SYSTOLIC BLOOD PRESSURE: 113 MMHG | DIASTOLIC BLOOD PRESSURE: 69 MMHG

## 2017-11-28 VITALS — SYSTOLIC BLOOD PRESSURE: 98 MMHG | DIASTOLIC BLOOD PRESSURE: 57 MMHG

## 2017-11-28 VITALS — SYSTOLIC BLOOD PRESSURE: 108 MMHG | DIASTOLIC BLOOD PRESSURE: 71 MMHG

## 2017-11-28 VITALS — DIASTOLIC BLOOD PRESSURE: 73 MMHG | SYSTOLIC BLOOD PRESSURE: 105 MMHG

## 2017-11-28 VITALS — SYSTOLIC BLOOD PRESSURE: 116 MMHG | DIASTOLIC BLOOD PRESSURE: 78 MMHG

## 2017-11-28 VITALS — DIASTOLIC BLOOD PRESSURE: 54 MMHG | SYSTOLIC BLOOD PRESSURE: 96 MMHG

## 2017-11-28 LAB
ALBUMIN SERPL-MCNC: 1.4 G/DL (ref 3.4–5)
ALP SERPL-CCNC: 138 U/L (ref 46–116)
ALT SERPL-CCNC: 57 U/L (ref 10–68)
ANION GAP SERPL CALC-SCNC: 12.3 MMOL/L (ref 8–16)
BASOPHILS NFR BLD AUTO: 0.1 % (ref 0–2)
BILIRUB SERPL-MCNC: 0.44 MG/DL (ref 0.2–1.3)
BUN SERPL-MCNC: 23 MG/DL (ref 7–18)
CALCIUM SERPL-MCNC: 7.8 MG/DL (ref 8.5–10.1)
CHLORIDE SERPL-SCNC: 100 MMOL/L (ref 98–107)
CO2 SERPL-SCNC: 27.2 MMOL/L (ref 21–32)
CREAT SERPL-MCNC: 0.6 MG/DL (ref 0.6–1.3)
EOSINOPHIL NFR BLD: 0.2 % (ref 0–7)
ERYTHROCYTE [DISTWIDTH] IN BLOOD BY AUTOMATED COUNT: 14.9 % (ref 11.5–14.5)
GLOBULIN SER-MCNC: 4.7 G/L
GLUCOSE SERPL-MCNC: 169 MG/DL (ref 74–106)
HCT VFR BLD CALC: 28.4 % (ref 36–48)
HGB BLD-MCNC: 9.1 G/DL (ref 12–16)
IMM GRANULOCYTES NFR BLD: 0.7 % (ref 0–5)
LYMPHOCYTES NFR BLD AUTO: 8.4 % (ref 15–50)
MAGNESIUM SERPL-MCNC: 2 MG/DL (ref 1.8–2.4)
MCH RBC QN AUTO: 29.9 PG (ref 26–34)
MCHC RBC AUTO-ENTMCNC: 32 G/DL (ref 31–37)
MCV RBC: 93.4 FL (ref 80–100)
MONOCYTES NFR BLD: 5.1 % (ref 2–11)
NEUTROPHILS NFR BLD AUTO: 85.5 % (ref 40–80)
OSMOLALITY SERPL CALC.SUM OF ELEC: 279 MOSM/KG (ref 275–300)
PLATELET # BLD: 305 10X3/UL (ref 130–400)
PMV BLD AUTO: 9.1 FL (ref 7.4–10.4)
POTASSIUM SERPL-SCNC: 3.5 MMOL/L (ref 3.5–5.1)
PROT SERPL-MCNC: 6.1 G/DL (ref 6.4–8.2)
RBC # BLD AUTO: 3.04 10X6/UL (ref 4–5.4)
SODIUM SERPL-SCNC: 136 MMOL/L (ref 136–145)
VANCOMYCIN SERPL-MCNC: 6.5 UG/ML (ref 10–20)
WBC # BLD AUTO: 12.1 10X3/UL (ref 4.8–10.8)

## 2017-11-28 NOTE — NUR
PT UP TO RESTROOM. HAD LARGE SOFT BROWN BOWEL MOVEMENT. PER CARE GIVEN. PT
AMBULATED BACK TO CHAIR WITH ASSIST.

## 2017-11-28 NOTE — NUR
REASSESSMENT COMPLETED AT THIS TIME. NO ACUTE CHANGES. SEE FLOW SHEET FOR
FURTHER DETAILS. PT POSITIONED FOR COMFORT. CALL LIGHT IN REACH WILL CONTINUE
TO MONITOR PT.

## 2017-11-28 NOTE — NUR
REASSESSMENT COMPLETED. NO CHNAGES AT THIS TIME. SEE FLOW SHEET FOR DETAILS.
PT POSITIONED FOR COMFORT WILL CONTINUE TO MONITOR.

## 2017-11-28 NOTE — NUR
PT HAD COMPLETE BATH AND LINEN CHANGE. COMBED THROUGH HAIR. DRESSINGS APPLIED
TO RIGHT LEG INCISIONS THAT WERE STILL HAVING SMALL AMOUNT OF SEROUS DRAINAGE.
NO S/S OF INFECTION NOTED. PT AMBULATED BACK TO BED. CALL LIGHT WITHIN REACH.
FRESH WATER GIVEN.

## 2017-11-28 NOTE — NUR
PT UP TO RESTROOM. VOIDED IN THE TOILET. HEIDI COLORED URINE NOTED. HAD SMALL
FORMED BOWEL MOVEMENT. PT UP AND AJ CARE GIVEN. PHYSICAL THERAPY AT BEDSIDE.
PT AMBULATED APPROX 145 FT. TOLERATED WELL. ASSISTED BACK TO CHAIR. CALL LIGHT
WITHIN REACH.

## 2017-11-28 NOTE — NUR
REPORT RECIEVED ASSESSMENT COMPLETED. SEE FLOW SHEET FOR FURTHER DETAILS. PT
POSITIONED FOR COMFORT WILL CONTINUE TO MONITOR PT.

## 2017-11-29 VITALS — DIASTOLIC BLOOD PRESSURE: 67 MMHG | SYSTOLIC BLOOD PRESSURE: 94 MMHG

## 2017-11-29 VITALS — SYSTOLIC BLOOD PRESSURE: 117 MMHG | DIASTOLIC BLOOD PRESSURE: 66 MMHG

## 2017-11-29 VITALS — DIASTOLIC BLOOD PRESSURE: 64 MMHG | SYSTOLIC BLOOD PRESSURE: 112 MMHG

## 2017-11-29 VITALS — DIASTOLIC BLOOD PRESSURE: 74 MMHG | SYSTOLIC BLOOD PRESSURE: 121 MMHG

## 2017-11-29 VITALS — DIASTOLIC BLOOD PRESSURE: 77 MMHG | SYSTOLIC BLOOD PRESSURE: 145 MMHG

## 2017-11-29 VITALS — SYSTOLIC BLOOD PRESSURE: 103 MMHG | DIASTOLIC BLOOD PRESSURE: 55 MMHG

## 2017-11-29 VITALS — SYSTOLIC BLOOD PRESSURE: 108 MMHG | DIASTOLIC BLOOD PRESSURE: 62 MMHG

## 2017-11-29 VITALS — DIASTOLIC BLOOD PRESSURE: 68 MMHG | SYSTOLIC BLOOD PRESSURE: 129 MMHG

## 2017-11-29 VITALS — SYSTOLIC BLOOD PRESSURE: 102 MMHG | DIASTOLIC BLOOD PRESSURE: 71 MMHG

## 2017-11-29 VITALS — SYSTOLIC BLOOD PRESSURE: 106 MMHG | DIASTOLIC BLOOD PRESSURE: 60 MMHG

## 2017-11-29 VITALS — DIASTOLIC BLOOD PRESSURE: 78 MMHG | SYSTOLIC BLOOD PRESSURE: 138 MMHG

## 2017-11-29 VITALS — SYSTOLIC BLOOD PRESSURE: 99 MMHG | DIASTOLIC BLOOD PRESSURE: 60 MMHG

## 2017-11-29 VITALS — SYSTOLIC BLOOD PRESSURE: 103 MMHG | DIASTOLIC BLOOD PRESSURE: 66 MMHG

## 2017-11-29 VITALS — SYSTOLIC BLOOD PRESSURE: 105 MMHG | DIASTOLIC BLOOD PRESSURE: 64 MMHG

## 2017-11-29 VITALS — SYSTOLIC BLOOD PRESSURE: 99 MMHG | DIASTOLIC BLOOD PRESSURE: 55 MMHG

## 2017-11-29 VITALS — SYSTOLIC BLOOD PRESSURE: 109 MMHG | DIASTOLIC BLOOD PRESSURE: 68 MMHG

## 2017-11-29 VITALS — SYSTOLIC BLOOD PRESSURE: 116 MMHG | DIASTOLIC BLOOD PRESSURE: 57 MMHG

## 2017-11-29 VITALS — SYSTOLIC BLOOD PRESSURE: 102 MMHG | DIASTOLIC BLOOD PRESSURE: 62 MMHG

## 2017-11-29 VITALS — DIASTOLIC BLOOD PRESSURE: 71 MMHG | SYSTOLIC BLOOD PRESSURE: 136 MMHG

## 2017-11-29 VITALS — SYSTOLIC BLOOD PRESSURE: 111 MMHG | DIASTOLIC BLOOD PRESSURE: 66 MMHG

## 2017-11-29 VITALS — DIASTOLIC BLOOD PRESSURE: 64 MMHG | SYSTOLIC BLOOD PRESSURE: 98 MMHG

## 2017-11-29 VITALS — SYSTOLIC BLOOD PRESSURE: 124 MMHG | DIASTOLIC BLOOD PRESSURE: 74 MMHG

## 2017-11-29 VITALS — DIASTOLIC BLOOD PRESSURE: 72 MMHG | SYSTOLIC BLOOD PRESSURE: 114 MMHG

## 2017-11-29 VITALS — DIASTOLIC BLOOD PRESSURE: 65 MMHG | SYSTOLIC BLOOD PRESSURE: 115 MMHG

## 2017-11-29 VITALS — DIASTOLIC BLOOD PRESSURE: 67 MMHG | SYSTOLIC BLOOD PRESSURE: 119 MMHG

## 2017-11-29 LAB
ANION GAP SERPL CALC-SCNC: 16.2 MMOL/L (ref 8–16)
BASOPHILS NFR BLD AUTO: 0.1 % (ref 0–2)
BUN SERPL-MCNC: 26 MG/DL (ref 7–18)
CALCIUM SERPL-MCNC: 7.7 MG/DL (ref 8.5–10.1)
CHLORIDE SERPL-SCNC: 94 MMOL/L (ref 98–107)
CO2 SERPL-SCNC: 23.8 MMOL/L (ref 21–32)
CREAT SERPL-MCNC: 0.8 MG/DL (ref 0.6–1.3)
EOSINOPHIL NFR BLD: 0.1 % (ref 0–7)
ERYTHROCYTE [DISTWIDTH] IN BLOOD BY AUTOMATED COUNT: 14.7 % (ref 11.5–14.5)
GLUCOSE SERPL-MCNC: 213 MG/DL (ref 74–106)
HCT VFR BLD CALC: 27.1 % (ref 36–48)
HGB BLD-MCNC: 8.7 G/DL (ref 12–16)
IMM GRANULOCYTES NFR BLD: 0.4 % (ref 0–5)
LYMPHOCYTES NFR BLD AUTO: 6.1 % (ref 15–50)
MCH RBC QN AUTO: 29.8 PG (ref 26–34)
MCHC RBC AUTO-ENTMCNC: 32.1 G/DL (ref 31–37)
MCV RBC: 92.8 FL (ref 80–100)
MONOCYTES NFR BLD: 4.9 % (ref 2–11)
NEUTROPHILS NFR BLD AUTO: 88.4 % (ref 40–80)
OSMOLALITY SERPL CALC.SUM OF ELEC: 271 MOSM/KG (ref 275–300)
PLATELET # BLD: 350 10X3/UL (ref 130–400)
PMV BLD AUTO: 9.4 FL (ref 7.4–10.4)
POTASSIUM SERPL-SCNC: 4 MMOL/L (ref 3.5–5.1)
RBC # BLD AUTO: 2.92 10X6/UL (ref 4–5.4)
SODIUM SERPL-SCNC: 130 MMOL/L (ref 136–145)
VANCOMYCIN SERPL-MCNC: 7.9 UG/ML (ref 10–20)
WBC # BLD AUTO: 14 10X3/UL (ref 4.8–10.8)

## 2017-11-29 NOTE — NUR
REASSESSMENT COMPLETED. NO CHNAGES. SEE FLOW SHEET FOR DETAILS. PT POSITIONED
FOR COMFORT WITH CALL LIGHT IN REACH. WILL CONTINUE TO MONITOR.

## 2017-11-29 NOTE — NUR
Nutrition Follow Up:
Diet advanced 11/28/17 to diabetic. Pt is eating 8% meal avg. Wt stable.
+BM 11/28/17. Labs reviewed. Meds noted including Lasix.
Rec continue current diet as tolerated.
RD following.

## 2017-11-29 NOTE — NUR
PT IN CHAIR AT BED SIDE. BEFORE XRAY AT 0410 PT REQUESTED TO USE THE RESTROOM
ASSISTED TO THE BATHROOM AND PT URINATED CONCENTRATED DARK YELLOW URINE 700
ML. RETURNED FROM XRAY AND PLACED IN THE CHAIR AND CHANGED SUBSTERNAL
DRESSING. WILL CONTINUE TO MONITOR PT.

## 2017-11-29 NOTE — NUR
NO FAMILY AT THIS TIME. VSS. NO NEW CHANGES. SLEEPING COMFORTABLY. WILL
CONTINUE TO MONITOR SUBJECTIVE:    Jennifer Guerrero is a 63 

## 2017-11-29 NOTE — NUR
PT AMBULATED 200FT WITH PHYSICAL THERAPY ASSIST. RETURNED TO CHAIR AT BEDSIDE.
CALL LIGHT WITHIN REACH. ST ON CM.

## 2017-11-29 NOTE — NUR
REPORT RECIEVED. ASSESSMENT COMPLETEPER FLOW SHEET. VSS. REFER FOR FIDNINGS.
PT SLEEPING COMFORTABLY. IS/COUGH/DEEPBREATHE ENCOURAGED GOAL  DENIES
NEEDS GIVEN WATER FOR COMFORT. WILL CONTINUE TO MONITOR

## 2017-11-29 NOTE — NUR
SHIFT ASSESSMENT VIA FLOWSHEET, SEE FOR DETAILS. ST ON CM. PT SITTING IN CHAIR
AT BEDSIDE. CALL LIGHT WITHIN REACH.

## 2017-11-30 VITALS — DIASTOLIC BLOOD PRESSURE: 73 MMHG | SYSTOLIC BLOOD PRESSURE: 124 MMHG

## 2017-11-30 VITALS — DIASTOLIC BLOOD PRESSURE: 77 MMHG | SYSTOLIC BLOOD PRESSURE: 126 MMHG

## 2017-11-30 VITALS — DIASTOLIC BLOOD PRESSURE: 74 MMHG | SYSTOLIC BLOOD PRESSURE: 127 MMHG

## 2017-11-30 VITALS — DIASTOLIC BLOOD PRESSURE: 70 MMHG | SYSTOLIC BLOOD PRESSURE: 124 MMHG

## 2017-11-30 VITALS — SYSTOLIC BLOOD PRESSURE: 112 MMHG | DIASTOLIC BLOOD PRESSURE: 64 MMHG

## 2017-11-30 VITALS — SYSTOLIC BLOOD PRESSURE: 122 MMHG | DIASTOLIC BLOOD PRESSURE: 76 MMHG

## 2017-11-30 VITALS — DIASTOLIC BLOOD PRESSURE: 70 MMHG | SYSTOLIC BLOOD PRESSURE: 119 MMHG

## 2017-11-30 VITALS — DIASTOLIC BLOOD PRESSURE: 78 MMHG | SYSTOLIC BLOOD PRESSURE: 138 MMHG

## 2017-11-30 VITALS — SYSTOLIC BLOOD PRESSURE: 126 MMHG | DIASTOLIC BLOOD PRESSURE: 63 MMHG

## 2017-11-30 VITALS — SYSTOLIC BLOOD PRESSURE: 123 MMHG | DIASTOLIC BLOOD PRESSURE: 67 MMHG

## 2017-11-30 VITALS — SYSTOLIC BLOOD PRESSURE: 114 MMHG | DIASTOLIC BLOOD PRESSURE: 72 MMHG

## 2017-11-30 VITALS — DIASTOLIC BLOOD PRESSURE: 71 MMHG | SYSTOLIC BLOOD PRESSURE: 121 MMHG

## 2017-11-30 VITALS — SYSTOLIC BLOOD PRESSURE: 134 MMHG | DIASTOLIC BLOOD PRESSURE: 72 MMHG

## 2017-11-30 VITALS — DIASTOLIC BLOOD PRESSURE: 66 MMHG | SYSTOLIC BLOOD PRESSURE: 109 MMHG

## 2017-11-30 VITALS — SYSTOLIC BLOOD PRESSURE: 115 MMHG | DIASTOLIC BLOOD PRESSURE: 68 MMHG

## 2017-11-30 VITALS — SYSTOLIC BLOOD PRESSURE: 138 MMHG | DIASTOLIC BLOOD PRESSURE: 78 MMHG

## 2017-11-30 VITALS — DIASTOLIC BLOOD PRESSURE: 79 MMHG | SYSTOLIC BLOOD PRESSURE: 125 MMHG

## 2017-11-30 VITALS — SYSTOLIC BLOOD PRESSURE: 121 MMHG | DIASTOLIC BLOOD PRESSURE: 71 MMHG

## 2017-11-30 VITALS — DIASTOLIC BLOOD PRESSURE: 70 MMHG | SYSTOLIC BLOOD PRESSURE: 127 MMHG

## 2017-11-30 VITALS — DIASTOLIC BLOOD PRESSURE: 75 MMHG | SYSTOLIC BLOOD PRESSURE: 123 MMHG

## 2017-11-30 VITALS — DIASTOLIC BLOOD PRESSURE: 74 MMHG | SYSTOLIC BLOOD PRESSURE: 125 MMHG

## 2017-11-30 VITALS — SYSTOLIC BLOOD PRESSURE: 129 MMHG | DIASTOLIC BLOOD PRESSURE: 68 MMHG

## 2017-11-30 VITALS — DIASTOLIC BLOOD PRESSURE: 55 MMHG | SYSTOLIC BLOOD PRESSURE: 106 MMHG

## 2017-11-30 VITALS — DIASTOLIC BLOOD PRESSURE: 49 MMHG | SYSTOLIC BLOOD PRESSURE: 105 MMHG

## 2017-11-30 VITALS — SYSTOLIC BLOOD PRESSURE: 114 MMHG | DIASTOLIC BLOOD PRESSURE: 68 MMHG

## 2017-11-30 LAB
ANION GAP SERPL CALC-SCNC: 12.5 MMOL/L (ref 8–16)
BASOPHILS NFR BLD AUTO: 0.1 % (ref 0–2)
BUN SERPL-MCNC: 21 MG/DL (ref 7–18)
CALCIUM SERPL-MCNC: 7.7 MG/DL (ref 8.5–10.1)
CHLORIDE SERPL-SCNC: 97 MMOL/L (ref 98–107)
CO2 SERPL-SCNC: 27 MMOL/L (ref 21–32)
CREAT SERPL-MCNC: 0.7 MG/DL (ref 0.6–1.3)
EOSINOPHIL NFR BLD: 0.3 % (ref 0–7)
ERYTHROCYTE [DISTWIDTH] IN BLOOD BY AUTOMATED COUNT: 14.5 % (ref 11.5–14.5)
GLUCOSE SERPL-MCNC: 139 MG/DL (ref 74–106)
HCT VFR BLD CALC: 25.5 % (ref 36–48)
HGB BLD-MCNC: 8.3 G/DL (ref 12–16)
IMM GRANULOCYTES NFR BLD: 0.3 % (ref 0–5)
LYMPHOCYTES NFR BLD AUTO: 12.1 % (ref 15–50)
MCH RBC QN AUTO: 29.7 PG (ref 26–34)
MCHC RBC AUTO-ENTMCNC: 32.5 G/DL (ref 31–37)
MCV RBC: 91.4 FL (ref 80–100)
MONOCYTES NFR BLD: 7.9 % (ref 2–11)
NEUTROPHILS NFR BLD AUTO: 79.3 % (ref 40–80)
OSMOLALITY SERPL CALC.SUM OF ELEC: 270 MOSM/KG (ref 275–300)
PLATELET # BLD: 330 10X3/UL (ref 130–400)
PMV BLD AUTO: 9 FL (ref 7.4–10.4)
POTASSIUM SERPL-SCNC: 3.5 MMOL/L (ref 3.5–5.1)
RBC # BLD AUTO: 2.79 10X6/UL (ref 4–5.4)
SODIUM SERPL-SCNC: 133 MMOL/L (ref 136–145)
VANCOMYCIN SERPL-MCNC: 7.7 UG/ML (ref 10–20)
WBC # BLD AUTO: 9.1 10X3/UL (ref 4.8–10.8)

## 2017-11-30 NOTE — NUR
DINNER TRAY PROVIDED. PT'S SIGNIFICANT OTHER IN ROOM. VSS, ST ON CM. PT
SITTING IN CHAIR AT BEDSIDE. VOICES NO ADDTIONAL NEEDS AT THIS TIME. CALL
LIGHT WITHIN REACH.

## 2017-11-30 NOTE — NUR
NEEDING TO VOID. ASSISTED UP TO RESTROOM. LEAKING NOTED ON GOWN POSSIBLY FROM
RT LEG DRSGS THAT ARE SATURATED. SOILED LINENS ON BED. LINENS CHANGED AND GOWN
CHANGED. URINE-CLEAR, HEIDI NOTED. NO BM. BACK TO BED, MINIMAL ASSIST. RT LEG
DRSG CHANGED PER PROTOCOL. SLIGHTLY LABORED BREATHING AFTER EXERTION. O2
SENSOR ATTEMPTING TO  AND FINALLY READS 94%. WILL MONITOR.

## 2017-11-30 NOTE — NUR
2100 MEDS GIVEN. DENIES TO BE TURNED IN BED OR NEED TO USE THE RESTROOM.
SUGAR-162. INSULIN GIVEN PER PROTOCOL. SEE EMAR. DENIES TO HAVE BIPAP MASK ON.
WILL MONITOR.

## 2017-11-30 NOTE — NUR
REASSESSMENT VIA FLOWSHEET, SEE FOR DETAILS. VSS, ST ON CM. PT REMAINS IN
CHAIR AT BEDSIDE. VOICES NO ADDITIONAL NEEDS AT THIS TIME. CALL LIGHT WITHIN
REACH.

## 2017-11-30 NOTE — NUR
REASSESSMENT COMPLETE PER FLOW SHEET. VSS. NO NEW CHANGES. PT SLEEPING
COMFORTABLY. WILL ONTINUE TO MONITOR

## 2017-11-30 NOTE — NUR
PT OOB TO CHAIR. BREAKFAST TRAY PROVIDED. VSS, ST ON CM. CALL LIGHT WITHIN
REACH. PT VOICES NO ADDITIONAL NEEDS AT THIS TIME.

## 2017-11-30 NOTE — NUR
PT AMBULATED 194FT WITH PHYSICAL THERAPY ASSIST. REQUIRED SEVERAL STOPS D/T PT
SHORTNESS OF BREATH. RETURNED TO CHAIR AT BEDSIDE. VSS POST WALK. , RR
17, SP02 98% 2 L NC, /60.

## 2017-11-30 NOTE — NUR
SHIFT ASSESSMENT COMPLETED. SEE ASSESSMENT FLOWSHEET. EYES CLOSED. AWAKENS
UPON TACTILE AND VERBAL STIMULATION. REPORTS PAIN TO CHEST IS BETTER AFTER
MEDS WORKING. GENERALIZED EDEMA NOTED. SINUS TACHYCARDIA ON THE MONITOR IN THE
110'S. O2 SAT 96-98% ON 2LPM/NC. TPM: VVI 60; VMA OF 10-SENSING ONLY.
MIDSTERNAL DRSG AND UPPER ABD AREA C/D/I. RT LEG WITH SEROSANGUINOUS DRAINAGE
NOTED TO DRSG. PEDAL PULSES +2. TEDS AND SCD'S ON AND WORKING TO LE'S
BILATERALLY. DENIES NEEDS AT PRESENT. WILL MONITOR.

## 2017-12-01 VITALS — DIASTOLIC BLOOD PRESSURE: 67 MMHG | SYSTOLIC BLOOD PRESSURE: 130 MMHG

## 2017-12-01 VITALS — DIASTOLIC BLOOD PRESSURE: 79 MMHG | SYSTOLIC BLOOD PRESSURE: 130 MMHG

## 2017-12-01 VITALS — SYSTOLIC BLOOD PRESSURE: 115 MMHG | DIASTOLIC BLOOD PRESSURE: 70 MMHG

## 2017-12-01 VITALS — SYSTOLIC BLOOD PRESSURE: 108 MMHG | DIASTOLIC BLOOD PRESSURE: 75 MMHG

## 2017-12-01 VITALS — SYSTOLIC BLOOD PRESSURE: 108 MMHG | DIASTOLIC BLOOD PRESSURE: 66 MMHG

## 2017-12-01 VITALS — DIASTOLIC BLOOD PRESSURE: 71 MMHG | SYSTOLIC BLOOD PRESSURE: 134 MMHG

## 2017-12-01 VITALS — SYSTOLIC BLOOD PRESSURE: 116 MMHG | DIASTOLIC BLOOD PRESSURE: 71 MMHG

## 2017-12-01 VITALS — DIASTOLIC BLOOD PRESSURE: 75 MMHG | SYSTOLIC BLOOD PRESSURE: 129 MMHG

## 2017-12-01 VITALS — DIASTOLIC BLOOD PRESSURE: 99 MMHG | SYSTOLIC BLOOD PRESSURE: 138 MMHG

## 2017-12-01 VITALS — SYSTOLIC BLOOD PRESSURE: 139 MMHG | DIASTOLIC BLOOD PRESSURE: 72 MMHG

## 2017-12-01 VITALS — DIASTOLIC BLOOD PRESSURE: 65 MMHG | SYSTOLIC BLOOD PRESSURE: 102 MMHG

## 2017-12-01 VITALS — DIASTOLIC BLOOD PRESSURE: 74 MMHG | SYSTOLIC BLOOD PRESSURE: 124 MMHG

## 2017-12-01 VITALS — DIASTOLIC BLOOD PRESSURE: 56 MMHG | SYSTOLIC BLOOD PRESSURE: 126 MMHG

## 2017-12-01 VITALS — SYSTOLIC BLOOD PRESSURE: 112 MMHG | DIASTOLIC BLOOD PRESSURE: 68 MMHG

## 2017-12-01 VITALS — SYSTOLIC BLOOD PRESSURE: 124 MMHG | DIASTOLIC BLOOD PRESSURE: 67 MMHG

## 2017-12-01 VITALS — DIASTOLIC BLOOD PRESSURE: 71 MMHG | SYSTOLIC BLOOD PRESSURE: 110 MMHG

## 2017-12-01 VITALS — SYSTOLIC BLOOD PRESSURE: 126 MMHG | DIASTOLIC BLOOD PRESSURE: 77 MMHG

## 2017-12-01 VITALS — SYSTOLIC BLOOD PRESSURE: 137 MMHG | DIASTOLIC BLOOD PRESSURE: 75 MMHG

## 2017-12-01 VITALS — DIASTOLIC BLOOD PRESSURE: 74 MMHG | SYSTOLIC BLOOD PRESSURE: 137 MMHG

## 2017-12-01 VITALS — SYSTOLIC BLOOD PRESSURE: 124 MMHG | DIASTOLIC BLOOD PRESSURE: 79 MMHG

## 2017-12-01 VITALS — SYSTOLIC BLOOD PRESSURE: 131 MMHG | DIASTOLIC BLOOD PRESSURE: 65 MMHG

## 2017-12-01 VITALS — SYSTOLIC BLOOD PRESSURE: 128 MMHG | DIASTOLIC BLOOD PRESSURE: 72 MMHG

## 2017-12-01 VITALS — SYSTOLIC BLOOD PRESSURE: 129 MMHG | DIASTOLIC BLOOD PRESSURE: 80 MMHG

## 2017-12-01 VITALS — SYSTOLIC BLOOD PRESSURE: 130 MMHG | DIASTOLIC BLOOD PRESSURE: 70 MMHG

## 2017-12-01 LAB
ALBUMIN SERPL-MCNC: 1.4 G/DL (ref 3.4–5)
ALP SERPL-CCNC: 191 U/L (ref 46–116)
ALT SERPL-CCNC: 34 U/L (ref 10–68)
ANION GAP SERPL CALC-SCNC: 12.3 MMOL/L (ref 8–16)
BILIRUB SERPL-MCNC: 0.37 MG/DL (ref 0.2–1.3)
BUN SERPL-MCNC: 20 MG/DL (ref 7–18)
CALCIUM SERPL-MCNC: 7.4 MG/DL (ref 8.5–10.1)
CHLORIDE SERPL-SCNC: 98 MMOL/L (ref 98–107)
CO2 SERPL-SCNC: 26.8 MMOL/L (ref 21–32)
CREAT SERPL-MCNC: 0.6 MG/DL (ref 0.6–1.3)
ERYTHROCYTE [DISTWIDTH] IN BLOOD BY AUTOMATED COUNT: 14.4 % (ref 11.5–14.5)
GLOBULIN SER-MCNC: 4 G/L
GLUCOSE SERPL-MCNC: 207 MG/DL (ref 74–106)
HCT VFR BLD CALC: 25.8 % (ref 36–48)
HGB BLD-MCNC: 8.4 G/DL (ref 12–16)
MCH RBC QN AUTO: 29.4 PG (ref 26–34)
MCHC RBC AUTO-ENTMCNC: 32.6 G/DL (ref 31–37)
MCV RBC: 90.2 FL (ref 80–100)
OSMOLALITY SERPL CALC.SUM OF ELEC: 274 MOSM/KG (ref 275–300)
PLATELET # BLD: 353 10X3/UL (ref 130–400)
PMV BLD AUTO: 8.4 FL (ref 7.4–10.4)
POTASSIUM SERPL-SCNC: 4.1 MMOL/L (ref 3.5–5.1)
PROT SERPL-MCNC: 5.4 G/DL (ref 6.4–8.2)
RBC # BLD AUTO: 2.86 10X6/UL (ref 4–5.4)
SODIUM SERPL-SCNC: 133 MMOL/L (ref 136–145)
VANCOMYCIN SERPL-MCNC: 9.2 UG/ML (ref 10–20)
WBC # BLD AUTO: 7.2 10X3/UL (ref 4.8–10.8)

## 2017-12-01 NOTE — NUR
Patient Name: DIANE KLEIN
Encounter No: [id number]
: 1965
Primary Insurance: [employer]
Anticipated DC Date: 2017
Planned Disposition: Inpatient Rehab Facility
External Planned Provider: [facility]
 
DCP follow-up note: Order rec'd for inpatient rehab. Discussed with patient.
She is agreeable to referral to [facility]. Referral faxed and called
to Dom with [facility]. Case management will follow and assist as needed.
Yumiko Mora

## 2017-12-01 NOTE — NUR
EYES OPENED UPON SPEAKING TO HER. REASSESSMENT COMPLETED. SEE ASSESSMENT
FLOWSHEET. DENIES PAIN UNTIL SHE MOVES. INFORMED OF UPCOMING XRAY. WILL
MONITOR.

## 2017-12-01 NOTE — NUR
SHIFT ASSESSMENT COMPLETED. SEE ASSESSMENT FLOWSHEET. BACK TO BED FROM CHAIR
WITH DAY AND NIGHT SHIFT NURSES. RT LEG DRSG WITH SEROSANGUINOUS DRAINAGE. ST
ON THE MONITOR IN 'S. TPM SET AT VVI-60; VMA OF 10 AND SENSING ONLY.
REPORTS PAIN TO LEFT SIDE OF CHEST. PAIN PILL GIVEN-SEE EMAR.

## 2017-12-01 NOTE — NUR
2100 MEDS GIVEN. SUGAR-190-INSULIN GIVEN PER PROTOCOL. RT LEG DRSG SATURATED
AND OOZING ONTO SHEETS. PLACED WHITE PAD UNDER LEG AND DRSG CHANGED PER
PROTOCOL. ESSENCE HOSE REAPPLIED BILATERALLY. SCD'S BACK ON. GETS SOB WITH
SWALLOWING MEDS/WATER OR ANYTIME SHE SWALLOWS. REPORTS IT HURTS HER LEFT SIDE
WHEN SHE SWALLOWS. NO S/S OF ASPIRATION. INFORMED HER TO TELL MD'S, REPORTED
SHE HAS DONE SO.

## 2017-12-01 NOTE — NUR
CRANBERRY JUICE GIVEN PER REQUEST. DENIES NEED TO VOID. BACK TO BED FROM
WHEELCHAIR. PULLED UP IN BED. WILL MONITOR.

## 2017-12-01 NOTE — NUR
WANTING TO MOVE AROUND IN BED. OFFERED TO HELP IF SHE NEEDED THE COMMODE.
ASSIST X2 TO RESTROOM. BROWN, LIQUID BM AND 600ML OF CLEAR, HEIDI URINE
EMPTIED. BED LINEN AND GOWN CHANGED. DENIES TO BRUSH TEETH AT THIS TIME. BACK
TO BED AND RECONNECTED TO MONITORING EQUIPMENT. PAIN PILL GIVEN PER REQUEST
FOR PAIN AT A 9/10 TO LEFT SIDE OF CHEST DOWN TO ABDOMEN. NOT NEW PAIN. PILL
GIVEN. WILL MONITOR.

## 2017-12-01 NOTE — NUR
0730-RECIVED AWAKE AND REQUESTING BEDSIDE COMMODE -ASSISTED UP TO
CHAIR-TOLERATED WELL-ST ON MONITOR
0830-AMBULATING IN RM WITH SLIGHT ASSISTANCE-KBRN
1030-AMBULATED WITH PHYSICAL THERAPY-NOTED C/O "BLACK SPOTS" IN EYES-ONLY ABLE
TO AMBULATE APPROX  FEET -REQUESTING TO SIT DOWN-ASSISTED BACK TO
BED-NOTED [id number] SAT 96%-KBRN

## 2017-12-01 NOTE — NUR
AM 0600 MEDS GIVEN. AM LAB DRAW SPECIMEN OBTAINED FROM RT IJ CVL BROWN PORT.
DRAWS AND FLUSHES WELL. AFTER DRINKING WATER WITH PILLS GETS OUT OF BREATHE.
O2 SATS SAME. RESP RATE INCREASED SLIGHTLY TO 28-32. WARM BLANKET GIVEN. WILL
MONITOR.

## 2017-12-01 NOTE — NUR
Nutrition Follow Up:
Pt is eating 12% meal avg on a diabetic diet. Noted per chart pt continues to
have shortness of breath. +BM 11/28/17. Labs reviewed. Meds noted including
Lasix.
Rec continue current diet.
Will send Glucerna with meals.
RD following.

## 2017-12-02 VITALS — SYSTOLIC BLOOD PRESSURE: 139 MMHG | DIASTOLIC BLOOD PRESSURE: 76 MMHG

## 2017-12-02 VITALS — SYSTOLIC BLOOD PRESSURE: 138 MMHG | DIASTOLIC BLOOD PRESSURE: 71 MMHG

## 2017-12-02 VITALS — SYSTOLIC BLOOD PRESSURE: 152 MMHG | DIASTOLIC BLOOD PRESSURE: 77 MMHG

## 2017-12-02 VITALS — SYSTOLIC BLOOD PRESSURE: 128 MMHG | DIASTOLIC BLOOD PRESSURE: 77 MMHG

## 2017-12-02 VITALS — SYSTOLIC BLOOD PRESSURE: 113 MMHG | DIASTOLIC BLOOD PRESSURE: 65 MMHG

## 2017-12-02 VITALS — DIASTOLIC BLOOD PRESSURE: 69 MMHG | SYSTOLIC BLOOD PRESSURE: 124 MMHG

## 2017-12-02 VITALS — SYSTOLIC BLOOD PRESSURE: 124 MMHG | DIASTOLIC BLOOD PRESSURE: 65 MMHG

## 2017-12-02 VITALS — SYSTOLIC BLOOD PRESSURE: 119 MMHG | DIASTOLIC BLOOD PRESSURE: 67 MMHG

## 2017-12-02 VITALS — DIASTOLIC BLOOD PRESSURE: 76 MMHG | SYSTOLIC BLOOD PRESSURE: 134 MMHG

## 2017-12-02 VITALS — DIASTOLIC BLOOD PRESSURE: 66 MMHG | SYSTOLIC BLOOD PRESSURE: 133 MMHG

## 2017-12-02 VITALS — DIASTOLIC BLOOD PRESSURE: 75 MMHG | SYSTOLIC BLOOD PRESSURE: 134 MMHG

## 2017-12-02 VITALS — DIASTOLIC BLOOD PRESSURE: 70 MMHG | SYSTOLIC BLOOD PRESSURE: 106 MMHG

## 2017-12-02 VITALS — DIASTOLIC BLOOD PRESSURE: 60 MMHG | SYSTOLIC BLOOD PRESSURE: 126 MMHG

## 2017-12-02 VITALS — DIASTOLIC BLOOD PRESSURE: 62 MMHG | SYSTOLIC BLOOD PRESSURE: 128 MMHG

## 2017-12-02 VITALS — DIASTOLIC BLOOD PRESSURE: 78 MMHG | SYSTOLIC BLOOD PRESSURE: 118 MMHG

## 2017-12-02 VITALS — SYSTOLIC BLOOD PRESSURE: 128 MMHG | DIASTOLIC BLOOD PRESSURE: 71 MMHG

## 2017-12-02 VITALS — DIASTOLIC BLOOD PRESSURE: 73 MMHG | SYSTOLIC BLOOD PRESSURE: 117 MMHG

## 2017-12-02 VITALS — SYSTOLIC BLOOD PRESSURE: 118 MMHG | DIASTOLIC BLOOD PRESSURE: 65 MMHG

## 2017-12-02 VITALS — SYSTOLIC BLOOD PRESSURE: 112 MMHG | DIASTOLIC BLOOD PRESSURE: 62 MMHG

## 2017-12-02 VITALS — SYSTOLIC BLOOD PRESSURE: 132 MMHG | DIASTOLIC BLOOD PRESSURE: 74 MMHG

## 2017-12-02 VITALS — DIASTOLIC BLOOD PRESSURE: 67 MMHG | SYSTOLIC BLOOD PRESSURE: 140 MMHG

## 2017-12-02 VITALS — SYSTOLIC BLOOD PRESSURE: 110 MMHG | DIASTOLIC BLOOD PRESSURE: 65 MMHG

## 2017-12-02 LAB
ALBUMIN SERPL-MCNC: 1.3 G/DL (ref 3.4–5)
ALP SERPL-CCNC: 202 U/L (ref 46–116)
ALT SERPL-CCNC: 32 U/L (ref 10–68)
ANION GAP SERPL CALC-SCNC: 12.2 MMOL/L (ref 8–16)
BILIRUB SERPL-MCNC: 0.36 MG/DL (ref 0.2–1.3)
BUN SERPL-MCNC: 19 MG/DL (ref 7–18)
CALCIUM SERPL-MCNC: 7.6 MG/DL (ref 8.5–10.1)
CHLORIDE SERPL-SCNC: 97 MMOL/L (ref 98–107)
CO2 SERPL-SCNC: 28.2 MMOL/L (ref 21–32)
CREAT SERPL-MCNC: 0.8 MG/DL (ref 0.6–1.3)
ERYTHROCYTE [DISTWIDTH] IN BLOOD BY AUTOMATED COUNT: 14.6 % (ref 11.5–14.5)
GLOBULIN SER-MCNC: 4.6 G/L
GLUCOSE SERPL-MCNC: 231 MG/DL (ref 74–106)
HCT VFR BLD CALC: 24.8 % (ref 36–48)
HGB BLD-MCNC: 8.1 G/DL (ref 12–16)
MCH RBC QN AUTO: 29.9 PG (ref 26–34)
MCHC RBC AUTO-ENTMCNC: 32.7 G/DL (ref 31–37)
MCV RBC: 91.5 FL (ref 80–100)
OSMOLALITY SERPL CALC.SUM OF ELEC: 274 MOSM/KG (ref 275–300)
PLATELET # BLD: 334 10X3/UL (ref 130–400)
PMV BLD AUTO: 8.7 FL (ref 7.4–10.4)
POTASSIUM SERPL-SCNC: 4.4 MMOL/L (ref 3.5–5.1)
PROT SERPL-MCNC: 5.9 G/DL (ref 6.4–8.2)
RBC # BLD AUTO: 2.71 10X6/UL (ref 4–5.4)
SODIUM SERPL-SCNC: 133 MMOL/L (ref 136–145)
VANCOMYCIN SERPL-MCNC: 9.7 UG/ML (ref 10–20)
WBC # BLD AUTO: 6 10X3/UL (ref 4.8–10.8)

## 2017-12-02 NOTE — NUR
0600 MEDS GIVEN. AM LABS DRAWN FROM RT IJ CVL WITHOUT DIFFICULTY. O2 SAT 93%
ON ROOM AIR. DENIES NEEDS. WILL MONITOR.

## 2017-12-02 NOTE — NUR
SHIFT ASSESSMENT COMPLETED. SEE ASSESSMENT FLOWSHEET FOR DETAILS. C/O PAIN TO
FEET, LEFT GREATER THAN RIGHT. ALSO C/O PAIN TO LEFT ARM. REPORTS FEET PAIN A
10/10 ON NUMBER SCALE AND DESCRIBED AS ACHE. PAIN PILL RECENTLY GIVEN. RT LEG
DRSG SATURATED WITH SEROSANGUINOUS DRAINAGE. WARM BLANKET GIVEN. WILL CHANGE
DRSG AFTER SHE WARMS UP. TEDS AND SCD'S IN PLACE AND WORKING PROPERLY. PLACED
PILLOW UNDER HEELS. WILL MONITOR.

## 2017-12-02 NOTE — NUR
REASSESSMENT COMPLETED. SEE ASSESSMENT FLOWSHEET. STARTED TO DISCONNECT FROM
CARDIAC MONITORING FOR UPCOMING XRAY. DENIES PAIN AT PRESENT. WILL MONITOR.

## 2017-12-02 NOTE — NUR
MIDSTERNAL AND UPPER ABD DRSG CHANGED PER PROTOCOL. CRANBERRY JUICE GIVEN PER
REQUEST. DENIES NEEDS. WILL MONITOR.

## 2017-12-02 NOTE — NUR
2100 MEDS GIVEN, SEE EMAR. RT LEG DRSG CHANGED PER PROTOCOL. C/O FEET PAIN.
SWELLING NOTED +3 PITTING TO ANKLE AREA. TOOK OFF ESSENCE HOSE. WILL GIVE BREAK
AND GET BIGGER SIZE. ELEVATED FEET ONTO 2 PILLOWS AND INCREASED ELEVATION TO
LOWER PART OF BED. WILL MONITOR.

## 2017-12-02 NOTE — NUR
REASSESSMENT COMPLETED. SEE ASSESSMENT FLOWSHEET. REPORTS MINIMIAL BUT SOME
RELIEF IN PAIN TO FEET. KLEENEX AND WATER WITHOUT ICE GIVEN PER REQUEST. WILL
MONITOR.

## 2017-12-03 VITALS — SYSTOLIC BLOOD PRESSURE: 102 MMHG | DIASTOLIC BLOOD PRESSURE: 66 MMHG

## 2017-12-03 VITALS — DIASTOLIC BLOOD PRESSURE: 82 MMHG | SYSTOLIC BLOOD PRESSURE: 141 MMHG

## 2017-12-03 VITALS — SYSTOLIC BLOOD PRESSURE: 137 MMHG | DIASTOLIC BLOOD PRESSURE: 76 MMHG

## 2017-12-03 VITALS — SYSTOLIC BLOOD PRESSURE: 120 MMHG | DIASTOLIC BLOOD PRESSURE: 64 MMHG

## 2017-12-03 VITALS — DIASTOLIC BLOOD PRESSURE: 68 MMHG | SYSTOLIC BLOOD PRESSURE: 122 MMHG

## 2017-12-03 VITALS — SYSTOLIC BLOOD PRESSURE: 135 MMHG | DIASTOLIC BLOOD PRESSURE: 68 MMHG

## 2017-12-03 VITALS — DIASTOLIC BLOOD PRESSURE: 62 MMHG | SYSTOLIC BLOOD PRESSURE: 119 MMHG

## 2017-12-03 VITALS — DIASTOLIC BLOOD PRESSURE: 68 MMHG | SYSTOLIC BLOOD PRESSURE: 119 MMHG

## 2017-12-03 VITALS — SYSTOLIC BLOOD PRESSURE: 133 MMHG | DIASTOLIC BLOOD PRESSURE: 78 MMHG

## 2017-12-03 VITALS — DIASTOLIC BLOOD PRESSURE: 62 MMHG | SYSTOLIC BLOOD PRESSURE: 122 MMHG

## 2017-12-03 VITALS — SYSTOLIC BLOOD PRESSURE: 125 MMHG | DIASTOLIC BLOOD PRESSURE: 72 MMHG

## 2017-12-03 VITALS — SYSTOLIC BLOOD PRESSURE: 148 MMHG | DIASTOLIC BLOOD PRESSURE: 81 MMHG

## 2017-12-03 VITALS — DIASTOLIC BLOOD PRESSURE: 69 MMHG | SYSTOLIC BLOOD PRESSURE: 121 MMHG

## 2017-12-03 VITALS — SYSTOLIC BLOOD PRESSURE: 121 MMHG | DIASTOLIC BLOOD PRESSURE: 69 MMHG

## 2017-12-03 VITALS — DIASTOLIC BLOOD PRESSURE: 63 MMHG | SYSTOLIC BLOOD PRESSURE: 119 MMHG

## 2017-12-03 VITALS — DIASTOLIC BLOOD PRESSURE: 69 MMHG | SYSTOLIC BLOOD PRESSURE: 114 MMHG

## 2017-12-03 VITALS — DIASTOLIC BLOOD PRESSURE: 64 MMHG | SYSTOLIC BLOOD PRESSURE: 106 MMHG

## 2017-12-03 VITALS — SYSTOLIC BLOOD PRESSURE: 125 MMHG | DIASTOLIC BLOOD PRESSURE: 64 MMHG

## 2017-12-03 VITALS — DIASTOLIC BLOOD PRESSURE: 70 MMHG | SYSTOLIC BLOOD PRESSURE: 103 MMHG

## 2017-12-03 VITALS — DIASTOLIC BLOOD PRESSURE: 69 MMHG | SYSTOLIC BLOOD PRESSURE: 123 MMHG

## 2017-12-03 VITALS — DIASTOLIC BLOOD PRESSURE: 72 MMHG | SYSTOLIC BLOOD PRESSURE: 136 MMHG

## 2017-12-03 VITALS — SYSTOLIC BLOOD PRESSURE: 106 MMHG | DIASTOLIC BLOOD PRESSURE: 52 MMHG

## 2017-12-03 LAB
ALBUMIN SERPL-MCNC: 1.4 G/DL (ref 3.4–5)
ALP SERPL-CCNC: 228 U/L (ref 46–116)
ALT SERPL-CCNC: 37 U/L (ref 10–68)
ANION GAP SERPL CALC-SCNC: 13.6 MMOL/L (ref 8–16)
BILIRUB SERPL-MCNC: 0.41 MG/DL (ref 0.2–1.3)
BUN SERPL-MCNC: 19 MG/DL (ref 7–18)
CALCIUM SERPL-MCNC: 7.7 MG/DL (ref 8.5–10.1)
CHLORIDE SERPL-SCNC: 95 MMOL/L (ref 98–107)
CO2 SERPL-SCNC: 27.7 MMOL/L (ref 21–32)
CREAT SERPL-MCNC: 0.8 MG/DL (ref 0.6–1.3)
ERYTHROCYTE [DISTWIDTH] IN BLOOD BY AUTOMATED COUNT: 14.8 % (ref 11.5–14.5)
GLOBULIN SER-MCNC: 4.7 G/L
GLUCOSE SERPL-MCNC: 220 MG/DL (ref 74–106)
HCT VFR BLD CALC: 25.3 % (ref 36–48)
HGB BLD-MCNC: 8.1 G/DL (ref 12–16)
MCH RBC QN AUTO: 29.2 PG (ref 26–34)
MCHC RBC AUTO-ENTMCNC: 32 G/DL (ref 31–37)
MCV RBC: 91.3 FL (ref 80–100)
OSMOLALITY SERPL CALC.SUM OF ELEC: 273 MOSM/KG (ref 275–300)
PLATELET # BLD: 442 10X3/UL (ref 130–400)
PMV BLD AUTO: 8.5 FL (ref 7.4–10.4)
POTASSIUM SERPL-SCNC: 4.3 MMOL/L (ref 3.5–5.1)
PROT SERPL-MCNC: 6.1 G/DL (ref 6.4–8.2)
RBC # BLD AUTO: 2.77 10X6/UL (ref 4–5.4)
SODIUM SERPL-SCNC: 132 MMOL/L (ref 136–145)
URATE SERPL-MCNC: 3.4 MG/DL (ref 2.6–7.2)
WBC # BLD AUTO: 6 10X3/UL (ref 4.8–10.8)

## 2017-12-03 NOTE — NUR
PAIN PILL GIVEN PER REQUEST OF SAME DESCRIBED PAIN AS BEFORE. WANTS TO GET
AHEAD OF THE PAIN. REASSESSMENT COMPLETED AT THIS TIME IN ATTEMPT TO KEEP
INTERRUPTING FOR SLEEPING PURPOSES SINCE BREATHING TREATMENT JUST GIVEN. WILL
MONITOR.

## 2017-12-03 NOTE — NUR
0750-RECIEVED AWAKE IN BED STATING FEET ON FIRE-NOTED EDEMA-HOT TO
TOUCH-PALPABLE PULSE-AND C/O OF SEVERE PAIN WHEN TOUCHED
0830-REFUSED AMBULATION-AGREED TO SIT IN CHAIR-CONCEDED PAIN NOT BETTER LAYING
IN BED-REQUIRED >80% ASSIST-ICE PACKS PLACED TO BOTH FEET AND ELEVATED ON
PILLOWS X2
0950-DR WINTERS AT [facility] AND REVIEWED SAME WITH HIM-LAB DRAWN AS ORDERED
1015-DR TOBAR AT [facility] AND REVIEWED SAME ISSUE WITH HIM-PT STATES PAIN
SLIGHTLY IMPROVED WITH ICE-PENDING LAB RESULLTS-REMAIN UP IN CHAIR
1100-LAB RESULTS CALLED TO DR WINTERS -NO FURTHER ORDERS
1110-LAB RESULT CALLED TO DR TOBAR-RECOMMENDED TO CONTINUE ELEVATION AND ICE
1145-PT REQUESTING BEDSIDE COMMODE-ABLE TO TRANSFER PT TO SAME WITH INCREASED
ASSISTANCE FROM PT -NOT ABLE TO TRANSFER BACK TO CHAIR FOR LUNCH TRAY-REQUIRED
ADDITIONAL ASSISTANCE FROM PT-KBRN
1300-REFUSES AMBULATION-ENCOURAGED TO REMAIN UP IN CHAIR-ICE PACK REPLACED
NOTED SLIGHTLY DECREASED SWELLING-KBRN
1430-ASSISTED BACK TO BED WITH AID OF PHYSICAL THERAPY-NOTED PT BEARED WEIGHT
1500-LEGS ELEVATED ICEPACK REPLACED-REFUSED ESSENCE-REQUESTS SCD
7101-[id number] EASILY
1700-NO CHANGES NOTED

## 2017-12-03 NOTE — NUR
SPOKE WITH DR. WINTERS VIA PHONE AND ASKED WHETHER HE WANTED PA & LAT
XRAY OR AM LABS. "NO" RESPONSE. EYES CLOSED. O2 SAT 91-93% ON ROOM AIR AT
PRESENT. WILL MONITOR.

## 2017-12-03 NOTE — NUR
REASSESSMENT COMPLETED AT THIS TIME. SEE FLOW SHEET FOR FURTHER DETAILS. NO
ACUTE CHNAGES AT THIS TIME. PT FEET ARE NOT HURTING LIKE THEY WERE AND SHE IS
ABLE TO REST. WILL CONTINUE TO MONITOR PT.

## 2017-12-03 NOTE — NUR
EYES CLOSED. TOP COVERS OFF TO WAIST AREA. REPLACED O2 SENSOR. O2 SAT 93% ON
ROOM AIR. WILL MONITOR.

## 2017-12-03 NOTE — NUR
AM VANC DRAWN FROM RT IJ CVL. FLUSHES WELL. BLOOD SPECIMEN USED TO GET
ACCUCHECK-163. OFFERED TO GET UP TO USE RESTROOM. WITH ONE NURSE ASSIST USED
BEDSIDE COMMODE. BM NOTED-LIQUID AND SEMI-SOLID BM NOTED. GOWN AND BED LINENS
CHANGED. BIGGER SIZED-KNEE LENGTH ESSENCE HOSE STARTED TO APPLY THEN BEGAN TO MOAN
AND GROAN IN PAIN. DENIES TO HAVE ESSENCE HOSE ON. HEELS ELEVATED ONCE BACK IN BED
AND ELEVATED FEET WITH BED. PAIN PILL GIVEN PER REQUEST AND INSULIN PER
PROTOCOL-SEE EMAR APPROX. 0600 TIME. WILL MONITOR.

## 2017-12-03 NOTE — NUR
REPORT RECIEVED.PT REQUESTED TO USE THE RESTROOM. ASSISTED TO THE BEDSIDE
COMMODE. PT  OF DARK YELLOW URINE AND A SMALL BM OF DIARRHEA. ASSISTED
BACK TO BED AND POSITIONED FOR COMFORT. ASSESSMENT COMPLETED. SEE FLOW SHEET
FOR DETAILS. PT FEET ARE SWOLLEN TEDS AND SCD'S APPLIED. WILL CONTINUE TO
MONITOR.

## 2017-12-04 VITALS — DIASTOLIC BLOOD PRESSURE: 72 MMHG | SYSTOLIC BLOOD PRESSURE: 132 MMHG

## 2017-12-04 VITALS — DIASTOLIC BLOOD PRESSURE: 66 MMHG | SYSTOLIC BLOOD PRESSURE: 112 MMHG

## 2017-12-04 VITALS — SYSTOLIC BLOOD PRESSURE: 127 MMHG | DIASTOLIC BLOOD PRESSURE: 72 MMHG

## 2017-12-04 VITALS — DIASTOLIC BLOOD PRESSURE: 67 MMHG | SYSTOLIC BLOOD PRESSURE: 115 MMHG

## 2017-12-04 VITALS — SYSTOLIC BLOOD PRESSURE: 120 MMHG | DIASTOLIC BLOOD PRESSURE: 70 MMHG

## 2017-12-04 VITALS — SYSTOLIC BLOOD PRESSURE: 134 MMHG | DIASTOLIC BLOOD PRESSURE: 77 MMHG

## 2017-12-04 VITALS — SYSTOLIC BLOOD PRESSURE: 108 MMHG | DIASTOLIC BLOOD PRESSURE: 58 MMHG

## 2017-12-04 VITALS — SYSTOLIC BLOOD PRESSURE: 124 MMHG | DIASTOLIC BLOOD PRESSURE: 68 MMHG

## 2017-12-04 VITALS — SYSTOLIC BLOOD PRESSURE: 125 MMHG | DIASTOLIC BLOOD PRESSURE: 74 MMHG

## 2017-12-04 VITALS — DIASTOLIC BLOOD PRESSURE: 68 MMHG | SYSTOLIC BLOOD PRESSURE: 136 MMHG

## 2017-12-04 VITALS — DIASTOLIC BLOOD PRESSURE: 63 MMHG | SYSTOLIC BLOOD PRESSURE: 117 MMHG

## 2017-12-04 VITALS — SYSTOLIC BLOOD PRESSURE: 127 MMHG | DIASTOLIC BLOOD PRESSURE: 67 MMHG

## 2017-12-04 VITALS — SYSTOLIC BLOOD PRESSURE: 138 MMHG | DIASTOLIC BLOOD PRESSURE: 74 MMHG

## 2017-12-04 VITALS — SYSTOLIC BLOOD PRESSURE: 107 MMHG | DIASTOLIC BLOOD PRESSURE: 64 MMHG

## 2017-12-04 VITALS — SYSTOLIC BLOOD PRESSURE: 138 MMHG | DIASTOLIC BLOOD PRESSURE: 78 MMHG

## 2017-12-04 VITALS — SYSTOLIC BLOOD PRESSURE: 130 MMHG | DIASTOLIC BLOOD PRESSURE: 75 MMHG

## 2017-12-04 VITALS — DIASTOLIC BLOOD PRESSURE: 73 MMHG | SYSTOLIC BLOOD PRESSURE: 124 MMHG

## 2017-12-04 VITALS — DIASTOLIC BLOOD PRESSURE: 64 MMHG | SYSTOLIC BLOOD PRESSURE: 124 MMHG

## 2017-12-04 VITALS — SYSTOLIC BLOOD PRESSURE: 136 MMHG | DIASTOLIC BLOOD PRESSURE: 55 MMHG

## 2017-12-04 VITALS — SYSTOLIC BLOOD PRESSURE: 106 MMHG | DIASTOLIC BLOOD PRESSURE: 57 MMHG

## 2017-12-04 VITALS — SYSTOLIC BLOOD PRESSURE: 127 MMHG | DIASTOLIC BLOOD PRESSURE: 76 MMHG

## 2017-12-04 VITALS — DIASTOLIC BLOOD PRESSURE: 76 MMHG | SYSTOLIC BLOOD PRESSURE: 129 MMHG

## 2017-12-04 VITALS — SYSTOLIC BLOOD PRESSURE: 117 MMHG | DIASTOLIC BLOOD PRESSURE: 63 MMHG

## 2017-12-04 NOTE — NUR
OT NOTE: PT COMPLETED BUE FM TASKS TO INCREASE I WITH ADLS. PT COMPLETED BUE
 STRENGTHENING WITH YELLOW THERAPUTTY .
THANK YOU, LALA BECKER/SANDRA

## 2017-12-04 NOTE — NUR
HS MEDS ADMINISTERED. PT STATES SHE IS GETTING READY TO GO TO BED. POSITIONED
FOR COMFORT WILL CONTINUE TO MONITOR PT.

## 2017-12-04 NOTE — NUR
REASSESSMENT COMPLETED. NO ACUTE CHANGES AT THIS TIME. SEE FLOW SHEET FOR
FURTHER DETAILS. PT REQUESTED TO USE THE RESTROOM URINATED 550 OF DARK YELLOW
URINE. ASSISTED BACK TO BED AND POSITIONED FOR COMFORT. WILL CONTINUE TO
MONITOR.

## 2017-12-04 NOTE — NUR
PATIENT BACK TO BED WITH MINIMAL ASSIST NEEDED. PATIENT STILL HAS C/O PAIN IN
FEET BILAT WHEN SHE MOVES THEM OR STANDS ON THEM.

## 2017-12-04 NOTE — NUR
REPORT RECIEVED. ASSESSMENT COMPLETED AT THIS TIME. SEE FLOW SHEET FOR FURTHER
DETAILS. PT POSITIONED FOR COMFORT. PT FEET ARE STARTING TO FEEL BETTER LESS
TENDER. WILL CONTINUE TO MONITOR PT.

## 2017-12-04 NOTE — NUR
REASSESSMENT COMPLETED AT THIS TIME. NO CHANGES AT THIS TIME. SEE FLOW SHEET
FOR FURTHER DETAILS. PT POSITIONED FOR COMFORT WILL CONTINUE TO MONITOR PT.

## 2017-12-04 NOTE — NUR
Patient Name: DIANE KLEIN
Encounter No: [id number]
: 1965
Primary Insurance: [employer] HMO
Anticipated DC Date: 2017
Planned Disposition: Inpatient Rehab Facility
External Planned Provider: [facility]
 
DCP follow-up note: Updated records, including OT consult, faxed to Dom with
[facility]. Waiting insurance auth. Patient and family in agreement with discharge
plan. No changes to plan. Case management will follow and assist as needed.
Yumiko Mora

## 2017-12-04 NOTE — NUR
Nutrition follow-up:
Diet: ADA consistent CHO with Glucerna Shake TID
PO intake continues to be poor
Labs reviewed
Pt c/o feet hurting
+BM, loose
Wt: 218#
Will continue to provide food choices and honor food preferences within diet
restrictions.
Recommend liberalizing diet to regular as tolerated to encourage increased po
intake.
RDN following.

## 2017-12-05 VITALS — SYSTOLIC BLOOD PRESSURE: 142 MMHG | DIASTOLIC BLOOD PRESSURE: 73 MMHG

## 2017-12-05 VITALS — DIASTOLIC BLOOD PRESSURE: 55 MMHG | SYSTOLIC BLOOD PRESSURE: 103 MMHG

## 2017-12-05 VITALS — SYSTOLIC BLOOD PRESSURE: 114 MMHG | DIASTOLIC BLOOD PRESSURE: 61 MMHG

## 2017-12-05 VITALS — SYSTOLIC BLOOD PRESSURE: 113 MMHG | DIASTOLIC BLOOD PRESSURE: 69 MMHG

## 2017-12-05 VITALS — SYSTOLIC BLOOD PRESSURE: 136 MMHG | DIASTOLIC BLOOD PRESSURE: 78 MMHG

## 2017-12-05 VITALS — SYSTOLIC BLOOD PRESSURE: 133 MMHG | DIASTOLIC BLOOD PRESSURE: 97 MMHG

## 2017-12-05 VITALS — SYSTOLIC BLOOD PRESSURE: 140 MMHG | DIASTOLIC BLOOD PRESSURE: 74 MMHG

## 2017-12-05 VITALS — DIASTOLIC BLOOD PRESSURE: 72 MMHG | SYSTOLIC BLOOD PRESSURE: 137 MMHG

## 2017-12-05 VITALS — DIASTOLIC BLOOD PRESSURE: 63 MMHG | SYSTOLIC BLOOD PRESSURE: 123 MMHG

## 2017-12-05 VITALS — SYSTOLIC BLOOD PRESSURE: 129 MMHG | DIASTOLIC BLOOD PRESSURE: 69 MMHG

## 2017-12-05 VITALS — SYSTOLIC BLOOD PRESSURE: 121 MMHG | DIASTOLIC BLOOD PRESSURE: 61 MMHG

## 2017-12-05 VITALS — SYSTOLIC BLOOD PRESSURE: 147 MMHG | DIASTOLIC BLOOD PRESSURE: 84 MMHG

## 2017-12-05 VITALS — SYSTOLIC BLOOD PRESSURE: 125 MMHG | DIASTOLIC BLOOD PRESSURE: 64 MMHG

## 2017-12-05 VITALS — SYSTOLIC BLOOD PRESSURE: 144 MMHG | DIASTOLIC BLOOD PRESSURE: 80 MMHG

## 2017-12-05 VITALS — SYSTOLIC BLOOD PRESSURE: 117 MMHG | DIASTOLIC BLOOD PRESSURE: 62 MMHG

## 2017-12-05 VITALS — DIASTOLIC BLOOD PRESSURE: 77 MMHG | SYSTOLIC BLOOD PRESSURE: 134 MMHG

## 2017-12-05 VITALS — DIASTOLIC BLOOD PRESSURE: 78 MMHG | SYSTOLIC BLOOD PRESSURE: 136 MMHG

## 2017-12-05 VITALS — SYSTOLIC BLOOD PRESSURE: 135 MMHG | DIASTOLIC BLOOD PRESSURE: 78 MMHG

## 2017-12-05 VITALS — SYSTOLIC BLOOD PRESSURE: 116 MMHG | DIASTOLIC BLOOD PRESSURE: 65 MMHG

## 2017-12-05 VITALS — DIASTOLIC BLOOD PRESSURE: 76 MMHG | SYSTOLIC BLOOD PRESSURE: 134 MMHG

## 2017-12-05 VITALS — SYSTOLIC BLOOD PRESSURE: 145 MMHG | DIASTOLIC BLOOD PRESSURE: 80 MMHG

## 2017-12-05 VITALS — DIASTOLIC BLOOD PRESSURE: 80 MMHG | SYSTOLIC BLOOD PRESSURE: 141 MMHG

## 2017-12-05 VITALS — DIASTOLIC BLOOD PRESSURE: 70 MMHG | SYSTOLIC BLOOD PRESSURE: 142 MMHG

## 2017-12-05 VITALS — SYSTOLIC BLOOD PRESSURE: 118 MMHG | DIASTOLIC BLOOD PRESSURE: 57 MMHG

## 2017-12-05 LAB
ALBUMIN SERPL-MCNC: 1.3 G/DL (ref 3.4–5)
ALP SERPL-CCNC: 272 U/L (ref 46–116)
ALT SERPL-CCNC: 30 U/L (ref 10–68)
ANION GAP SERPL CALC-SCNC: 12.8 MMOL/L (ref 8–16)
BILIRUB SERPL-MCNC: 0.4 MG/DL (ref 0.2–1.3)
BUN SERPL-MCNC: 19 MG/DL (ref 7–18)
CALCIUM SERPL-MCNC: 7.4 MG/DL (ref 8.5–10.1)
CHLORIDE SERPL-SCNC: 93 MMOL/L (ref 98–107)
CO2 SERPL-SCNC: 27.5 MMOL/L (ref 21–32)
CREAT SERPL-MCNC: 0.9 MG/DL (ref 0.6–1.3)
ERYTHROCYTE [DISTWIDTH] IN BLOOD BY AUTOMATED COUNT: 15.2 % (ref 11.5–14.5)
GLOBULIN SER-MCNC: 4.7 G/L
GLUCOSE SERPL-MCNC: 232 MG/DL (ref 74–106)
HCT VFR BLD CALC: 24.1 % (ref 36–48)
HGB BLD-MCNC: 7.7 G/DL (ref 12–16)
MCH RBC QN AUTO: 29.4 PG (ref 26–34)
MCHC RBC AUTO-ENTMCNC: 32 G/DL (ref 31–37)
MCV RBC: 92 FL (ref 80–100)
OSMOLALITY SERPL CALC.SUM OF ELEC: 267 MOSM/KG (ref 275–300)
PLATELET # BLD: 539 10X3/UL (ref 130–400)
PMV BLD AUTO: 8.9 FL (ref 7.4–10.4)
POTASSIUM SERPL-SCNC: 4.3 MMOL/L (ref 3.5–5.1)
PROT SERPL-MCNC: 6 G/DL (ref 6.4–8.2)
RBC # BLD AUTO: 2.62 10X6/UL (ref 4–5.4)
SODIUM SERPL-SCNC: 129 MMOL/L (ref 136–145)
WBC # BLD AUTO: 6.7 10X3/UL (ref 4.8–10.8)

## 2017-12-05 NOTE — NUR
COMPLETE LINEN CHANGE PROVIDED. OFFERED BATH PT STATED SHE WANTED TO WAIT
UNTIL TONIGHT. HAIR BRUSHED AND BRAIDED. REPOSITIONED FOR COMFORT IN RECLINER.
CALL LIGHT IN REACH. BED IN LOW POSISTION.

## 2017-12-05 NOTE — NUR
REASSESSMENT COMPLETED. PT STILL RUNNING LOW GRADE TEMP. WILL CONTINUE TO
MONITOR CLOSELY. HAS HAD TEMP PRIO TO PRBC ADMINISTRATION. NO SIGNIFICANT
CHANGES SINCE.

## 2017-12-05 NOTE — NUR
2100 MEDS GIVEN. FSBS 137. NO INSULIN GIVEN AT THIS TIME PER SLIDING SCALE.
WILL CONTINUE TO MONITOR FOR CHANGES. VSS. 2ND UNIT OF BLOOD BEING
ADMINISTERED CURRENTLY. NO SIGN OF RACTION. WILL MONITOR

## 2017-12-05 NOTE — NUR
REASSESSMENT COMPLETED AT THIS TIME. NO CHANGES. SEE FLOW SHEET FOR FURTHER
DETAILS. PT POSITIONED FOR COMFORT. WILL CONTINUE TO MONITOR.

## 2017-12-05 NOTE — NUR
Rec'd message from [facility] - she states they are still
working on authorization from insurance for rehab transfer. Anticipate
discharge this afternoon if approved.

## 2017-12-05 NOTE — NUR
REPORT RECIEVED FROM NIGHT SHIFT NURSE. PT RESTING IN CHAIR QUIETLY. FULL
ASSESSMENT COMPLETE PER FLOWSHEET. REFER FOR DETAILS. TPM WIRES INTACT.
VSS. WILL CONT TO MONITOR FOR CHANGES THROUGHOUT SHIFT.

## 2017-12-05 NOTE — NUR
Patient Name: DIANE KLEIN
Encounter No: [id number]
: 1965
Primary Insurance: [employer] HMO
Anticipated DC Date: 2017
Planned Disposition: Inpatient Rehab Facility
External Planned Provider: [facility]
 
DCP follow-up note: Rec[facility] - patient has
been accepted by Dr. Crespo & can transfer in the morning via transport van -
 time will be between 1267-0500. Nursing to call report to [phone].
Patient and family in agreement with discharge plan. No changes to plan.
Yumiko Mora

## 2017-12-05 NOTE — NUR
REPORT RECEIVED AND ASSESSMENT COMPLETED. PT IS POST OP CABG BY DR WINTERS. SET
TO X-PADMINI IN AM TO REHAB FACILITY. CURRENTLY HAS FIRST UNIT OF BLOOD OUT OF 2
INFUSING. WILL CONTINUE ADMINISTRATION AND ADMINISTER NEXT WHEN FIRST IS
COMPLETE. TPM IN PLACE. CURRENTLY SENSING. SET AT VVI RATE OF 60 VMA 10. PT IS
SINUS TACH AT THIS TIME. WILL MONITOR CLOSELY FOR CHANGES.

## 2017-12-06 VITALS — DIASTOLIC BLOOD PRESSURE: 86 MMHG | SYSTOLIC BLOOD PRESSURE: 150 MMHG

## 2017-12-06 VITALS — SYSTOLIC BLOOD PRESSURE: 144 MMHG | DIASTOLIC BLOOD PRESSURE: 88 MMHG

## 2017-12-06 VITALS — DIASTOLIC BLOOD PRESSURE: 66 MMHG | SYSTOLIC BLOOD PRESSURE: 123 MMHG

## 2017-12-06 VITALS — DIASTOLIC BLOOD PRESSURE: 84 MMHG | SYSTOLIC BLOOD PRESSURE: 129 MMHG

## 2017-12-06 VITALS — SYSTOLIC BLOOD PRESSURE: 143 MMHG | DIASTOLIC BLOOD PRESSURE: 77 MMHG

## 2017-12-06 VITALS — DIASTOLIC BLOOD PRESSURE: 72 MMHG | SYSTOLIC BLOOD PRESSURE: 134 MMHG

## 2017-12-06 VITALS — DIASTOLIC BLOOD PRESSURE: 75 MMHG | SYSTOLIC BLOOD PRESSURE: 131 MMHG

## 2017-12-06 VITALS — DIASTOLIC BLOOD PRESSURE: 61 MMHG | SYSTOLIC BLOOD PRESSURE: 120 MMHG

## 2017-12-06 VITALS — SYSTOLIC BLOOD PRESSURE: 129 MMHG | DIASTOLIC BLOOD PRESSURE: 84 MMHG

## 2017-12-06 LAB
ANION GAP SERPL CALC-SCNC: 12.2 MMOL/L (ref 8–16)
BASOPHILS NFR BLD AUTO: 0.2 % (ref 0–2)
BUN SERPL-MCNC: 16 MG/DL (ref 7–18)
CALCIUM SERPL-MCNC: 8 MG/DL (ref 8.5–10.1)
CHLORIDE SERPL-SCNC: 95 MMOL/L (ref 98–107)
CO2 SERPL-SCNC: 28 MMOL/L (ref 21–32)
CREAT SERPL-MCNC: 0.9 MG/DL (ref 0.6–1.3)
EOSINOPHIL NFR BLD: 0.4 % (ref 0–7)
ERYTHROCYTE [DISTWIDTH] IN BLOOD BY AUTOMATED COUNT: 15.8 % (ref 11.5–14.5)
GLUCOSE SERPL-MCNC: 177 MG/DL (ref 74–106)
HCT VFR BLD CALC: 38.3 % (ref 36–48)
HGB BLD-MCNC: 12.6 G/DL (ref 12–16)
IMM GRANULOCYTES NFR BLD: 0.4 % (ref 0–5)
LYMPHOCYTES NFR BLD AUTO: 17.7 % (ref 15–50)
MAGNESIUM SERPL-MCNC: 1.2 MG/DL (ref 1.8–2.4)
MCH RBC QN AUTO: 29.1 PG (ref 26–34)
MCHC RBC AUTO-ENTMCNC: 32.9 G/DL (ref 31–37)
MCV RBC: 88.5 FL (ref 80–100)
MONOCYTES NFR BLD: 13.1 % (ref 2–11)
NEUTROPHILS NFR BLD AUTO: 68.2 % (ref 40–80)
OSMOLALITY SERPL CALC.SUM OF ELEC: 267 MOSM/KG (ref 275–300)
PHOSPHATE SERPL-MCNC: 3.5 MG/DL (ref 2.5–4.9)
PLATELET # BLD: 344 10X3/UL (ref 130–400)
PMV BLD AUTO: 8.5 FL (ref 7.4–10.4)
POTASSIUM SERPL-SCNC: 4.2 MMOL/L (ref 3.5–5.1)
RBC # BLD AUTO: 4.33 10X6/UL (ref 4–5.4)
SODIUM SERPL-SCNC: 131 MMOL/L (ref 136–145)
WBC # BLD AUTO: 5.2 10X3/UL (ref 4.8–10.8)

## 2017-12-06 NOTE — NUR
REPORT RECIEVED FROM NIGHT SHIFT NURSE. PT RESTING IN BED QUIETLY. FULL
ASSESSMENT COMPLETE PER FLOWSHEET. REFER FOR DETAILS. TPM WIRES INTACT.
VSS. WILL CONT TO MONITOR FOR CHANGES THROUGHOUT SHIFT.

## 2017-12-06 NOTE — NUR
SPOKE WITH DESTINY, . STATED SHE SPOKE TO [facility], 
AND EVERYTHING IS SET FOR HER TO BE TAKEN THERE[facility] TO [facility] AT 10 OR 1030 AM.

## 2017-12-06 NOTE — NUR
Patient Name:  DIANE KLEIN
Encounter No:  [id number]
:  1965
Primary Insurance:  [employer] HMO
Anticipated DC Date: 2017
Planned Disposition:  Inpatient Rehab Facility
External Planned Provider: : [facility]
 
 
ANDIP follow-up note: Called and spoke with Zena at [facility] notified
of discharge order. Zena stated they planned to come  the patient around
10:30. Faxed copy of MD order to Zena. Will fax med list and DC instructions
upon completion. Notified nurse of anticipated  time. CM will continue
to follow and assist as needed with DC planning / needs.

## 2017-12-06 NOTE — NUR
Nutrition Follow Up:
Pt stated that her appetite is slowly improving. She said that she likes the
Glucerna.
Pt is eating 45% meal avg on a diabetic diet. She is receiving Glucerna TID.
+BM 12/6/17. Wt stable. Meds noted including Lasix. Labs reviewed.
Rec continue current diet.
RD following.

## 2017-12-06 NOTE — NUR
ATTEMPTED TO CALL REPORT TO NURSE AT [facility]. CHARGE NURSE STATED
SHE HAD NOT MADE ASSIGNMENTS YET AND THAT NURSE WAS UNAVAILABLE TO TAKE REPORT
BECAUSE SHE WAS IN A ROOM WITH A PT. SHE ASKED FOR ME TO LEAVE MY NUMBER FOR
HER TO CALL ME BACK ON. AWAITING PHONE CALL. WILL ATTEMPT REPORT AGAIN.

## 2017-12-06 NOTE — NUR
PICKED UP BY [facility] EMPLOYEE. TRANSFERED VIA W/C. SIGNIFICANT OTHER AT
BEDSIDE WITH ALL BELONGINGS.

## 2017-12-06 NOTE — NUR
NO CHANGES IN STATUS AT THIS TIME. REASSESSMENT COMPLETED. SEE FLOWSHEET FOR
FULL DETAILS. CVL DRSG CHANGES AT THIS TIME. WILL CONTNUE TO MONITOR

## 2017-12-12 NOTE — TEE
PATIENT:DIANE KLEIN              MEDICAL RECORD: [id number]
                                               LOCATION:[place] [address]
AGE OF PATIENT: 52                             ADMISSION DATE: 11/17/17
SEX: F   
 
REFERRING PHYSICIAN:                                                             
 
INTERPRETING PHYSICIAN: NORBERTO VILLARREAL MD              

 
 
                         TRANSESOPHAGEAL ECHOCARDIOGRAM
                 BEATA CHARGE  Y
                INDICATIONS: CABG                     
 
             PREMEDICATIONS:                               
 
PATIENT'S RESPONSE PROCEDURE                          
 
                     DOPPLER MEASUREMENTS:
            LVIT            LA           PA           RA     
            LVOT          RVOT      Asc. Ao     
AV Gradient Peak       AV Mean      AV Area     
MV Gradient Peak       MV Mean      MV Area     
 INTERPRETATION:                          
 
 
 
 
               Doppler:                          
 
                   2-D: EF 60%                   
 
     COLOR FLOW DOPPLER TRACE MR                 
 
   NORMAL SALINE STUDY:                     
 
          MISCELLANOUS:                          
 
             DIAGNOSIS:                          
 
                  PLAN:                          
 
           Cardiologist:4          Dr. Villarreal               
   Cardiac Sonographer: Esteban PLEITEZ              
              COMMENTS: VIANEY DEE                               
                                                                                     
 
 
DATE OF SERVICE:  11/17/2017
 
FINDINGS:
1.  The left ventricle is hyperdynamic.
2.   There is no significant mitral regurgitation.  The mitral valve appears to 
be structurally normal.
3.  The tricuspid valve appears to be structurally normal.  There is evidence of
right-sided catheter placement.
4.  Aortic valve is normal.
 
 
 
TRANSESOPHAGEAL ECHOCARDIOGRAM REPORT                    [id number]    DIANE KLEIN 
 
 
5.  Ejection fraction is hyperdynamic post CABG.
 
TRANSINT:[id number] Voice Confirmation ID: [id number] DOCUMENT ID: [id number]
 
11/29/2017 Edited to correct date of service, dmm. 
 
 
 
Electronically Signed by NORBERTO VILLARREAL on 12/12/17 at 0855
 
 
 
 
 
 
 
 
 
 
 
 
 
 
 
 
 
 
 
 
 
 
 
 
 
 
 
 
 
 
 
 
 
 
 
 
 
CC:                                                             [id number]
DICTATION DATE: 11/21/17 2012     :     11/21/17 2236      DIS IN  
                                                                      12/06/17
[place] [id number] [place] [address]

## 2017-12-12 NOTE — EC
PATIENT:DIANE KLEIN              DATE OF SERVICE: 11/27/17
SEX: F                                  MEDICAL RECORD: [id number]
DATE OF BIRTH: [birth year]                        LOCATION:[place] [address]
AGE OF PATIENT: 52                             ADMISSION DATE: 11/17/17
 
REFERRING PHYSICIAN:                               
 
INTERPRETING PHYSICIAN: NORBERTO VILLARREAL MD              
 
 
 
                             ECHOCARDIOGRAM REPORT
  ECHO CHARGES 4               ECHO COMPLETE            
 
 
 
CLINICAL DIAGNOSIS: S/P CABG                      
 
                         ECHOCARDIOGRAPHIC MEASUREMENTS
      (adult normal given)
   AC root (d.<3.7cm) 3.0  cm   LV Septum d (<1.2 cm> 1.1  cm
      Valve Excursion 1.7  cm     LV Septum (systole) 1.5  cm
Left Atria (s.<4.0cm> 4.3  cm          LVPW d(<1.2cm) 0.9  cm
        RV (d.<2.3cm) 2.3  cm           LVPW (sytole) 1.6  cm
  LV diastole(<5.6CM) 4.9  cm       MV E-F(>70mm/sec)      cm
           LV systole 3.3  cm           LVOT Diameter 1.8  cm
       MV exc.(>10mm)      cm
Est.ejection fraction (50-75%)     %   Pericardial Effusion N
 
   DOPPLER:
     LVIT      cm/sec A 124  cm/sec E 62.0  cm/sec
       LA      cm/sec      RVSP 28.0 mmHg
     LVOT 69.0 cm/sec   AOP1/2T      m/s
  Asc. Ao 88.0 cm/sec
     RVOT 72.0 cm/sec
       RA      cm/sec
       PA 75.0 cm/sec
 AV Gradient Peak 3.1  mmHg  AV Mean 1.2  mmHg  AV Area 2.4  cm
 MV Gradient Peak 8.6  mmHg  MV Mean 3.1  mmHg  MV Area      cm
   COMMENTS: VIANEY DEE                               
 
 
 Cardiac Sonographer: 1               ADDIS SALOMONDSOE            
      Cardiologist: 4          Dr. Villarreal               
             TAPE# [id number]           
                                                                                     
 
 
DATE OF SERVICE:  11/27/2017
 
PROCEDURE:  Limited echocardiography for function pericardial effusion or
significant valvular insufficiencies.
 
FINDINGS:  Overall quality of the echo was poor.  It was difficult to visualize
many segments of the left ventricle.  What we could see was the lateral wall was
hyperkinetic.  The septal wall was hyperkinetic, anterior wall appears mildly
hypokinetic.  The overall ejection fraction appears to be mildly reduced in the
45% to 50% range from what we can visualize.  There was no significant
 
 
 
ECHOCARDIOGRAM REPORT                          [id number]    DIANE KLEIN      
 
 
pericardial effusion.
 
TRANSINT:[id number] Voice Confirmation ID: [id number] DOCUMENT ID: [id number]
12/07/17 Edited to correct date of service, dmm. 
                                           
                                           NORBERTO VILLARREAL MD              
 
 
 
Electronically Signed by NORBERTO VILLARREAL on 12/12/17 at 0855
 
 
 
 
 
 
 
 
 
 
 
 
 
 
 
 
 
 
 
 
 
 
 
 
 
 
 
 
 
 
 
 
 
 
 
 
CC:                                                             [id number]
DICTATION DATE: 11/28/17 1019     :     11/28/17 1342      DIS IN  
                                                                      12/06/17
[place] [address] [place] [address]

## 2017-12-19 ENCOUNTER — HOSPITAL ENCOUNTER (INPATIENT)
Dept: HOSPITAL 84 - D.ER | Age: 52
LOS: 3 days | Discharge: HOME | DRG: 918 | End: 2017-12-22
Attending: FAMILY MEDICINE | Admitting: FAMILY MEDICINE
Payer: COMMERCIAL

## 2017-12-19 VITALS
BODY MASS INDEX: 29.99 KG/M2 | BODY MASS INDEX: 29.99 KG/M2 | WEIGHT: 180 LBS | HEIGHT: 65 IN | BODY MASS INDEX: 29.99 KG/M2 | WEIGHT: 180 LBS | HEIGHT: 65 IN

## 2017-12-19 DIAGNOSIS — M32.9: ICD-10-CM

## 2017-12-19 DIAGNOSIS — T38.3X1A: Primary | ICD-10-CM

## 2017-12-19 DIAGNOSIS — R00.0: ICD-10-CM

## 2017-12-19 DIAGNOSIS — E11.649: ICD-10-CM

## 2017-12-19 DIAGNOSIS — Z79.4: ICD-10-CM

## 2017-12-19 DIAGNOSIS — I25.10: ICD-10-CM

## 2017-12-19 DIAGNOSIS — E03.9: ICD-10-CM

## 2017-12-19 DIAGNOSIS — I10: ICD-10-CM

## 2017-12-19 DIAGNOSIS — N39.0: ICD-10-CM

## 2017-12-19 LAB
ALBUMIN SERPL-MCNC: 1.4 G/DL (ref 3.4–5)
ALP SERPL-CCNC: 520 U/L (ref 46–116)
ALT SERPL-CCNC: 23 U/L (ref 10–68)
ANION GAP SERPL CALC-SCNC: 12.1 MMOL/L (ref 8–16)
BILIRUB SERPL-MCNC: 0.19 MG/DL (ref 0.2–1.3)
BUN SERPL-MCNC: 21 MG/DL (ref 7–18)
CALCIUM SERPL-MCNC: 8.7 MG/DL (ref 8.5–10.1)
CHLORIDE SERPL-SCNC: 96 MMOL/L (ref 98–107)
CO2 SERPL-SCNC: 31.4 MMOL/L (ref 21–32)
CREAT SERPL-MCNC: 0.9 MG/DL (ref 0.6–1.3)
ERYTHROCYTE [DISTWIDTH] IN BLOOD BY AUTOMATED COUNT: 15.6 % (ref 11.5–14.5)
GLOBULIN SER-MCNC: 5.5 G/L
GLUCOSE SERPL-MCNC: 101 MG/DL (ref 74–106)
HCT VFR BLD CALC: 33 % (ref 36–48)
HGB BLD-MCNC: 10.6 G/DL (ref 12–16)
LYMPHOCYTES NFR BLD AUTO: 21.3 % (ref 15–50)
MAGNESIUM SERPL-MCNC: 1.3 MG/DL (ref 1.8–2.4)
MCH RBC QN AUTO: 29.7 PG (ref 26–34)
MCHC RBC AUTO-ENTMCNC: 32.1 G/DL (ref 31–37)
MCV RBC: 92.4 FL (ref 80–100)
NEUTROPHILS NFR BLD AUTO: 67.3 % (ref 40–80)
OSMOLALITY SERPL CALC.SUM OF ELEC: 272 MOSM/KG (ref 275–300)
PLATELET # BLD: 692 10X3/UL (ref 130–400)
PMV BLD AUTO: 8.5 FL (ref 7.4–10.4)
POTASSIUM SERPL-SCNC: 4.5 MMOL/L (ref 3.5–5.1)
PROT SERPL-MCNC: 6.9 G/DL (ref 6.4–8.2)
RBC # BLD AUTO: 3.57 10X6/UL (ref 4–5.4)
SODIUM SERPL-SCNC: 135 MMOL/L (ref 136–145)
WBC # BLD AUTO: 6.9 10X3/UL (ref 4.8–10.8)

## 2017-12-20 VITALS — SYSTOLIC BLOOD PRESSURE: 139 MMHG | DIASTOLIC BLOOD PRESSURE: 74 MMHG

## 2017-12-20 VITALS — SYSTOLIC BLOOD PRESSURE: 150 MMHG | DIASTOLIC BLOOD PRESSURE: 87 MMHG

## 2017-12-20 VITALS — DIASTOLIC BLOOD PRESSURE: 81 MMHG | SYSTOLIC BLOOD PRESSURE: 144 MMHG

## 2017-12-20 VITALS — SYSTOLIC BLOOD PRESSURE: 139 MMHG | DIASTOLIC BLOOD PRESSURE: 85 MMHG

## 2017-12-20 VITALS — DIASTOLIC BLOOD PRESSURE: 69 MMHG | SYSTOLIC BLOOD PRESSURE: 122 MMHG

## 2017-12-20 VITALS — SYSTOLIC BLOOD PRESSURE: 124 MMHG | DIASTOLIC BLOOD PRESSURE: 97 MMHG

## 2017-12-20 LAB
AMPHETAMINES UR QL SCN: NEGATIVE QUAL
ANION GAP SERPL CALC-SCNC: 15.7 MMOL/L (ref 8–16)
APPEARANCE UR: (no result)
BACTERIA #/AREA URNS HPF: (no result) /HPF
BARBITURATES UR QL SCN: NEGATIVE QUAL
BENZODIAZ UR QL SCN: NEGATIVE QUAL
BILIRUB SERPL-MCNC: NEGATIVE MG/DL
BUN SERPL-MCNC: 23 MG/DL (ref 7–18)
BZE UR QL SCN: NEGATIVE QUAL
CALCIUM SERPL-MCNC: 8.3 MG/DL (ref 8.5–10.1)
CANNABINOIDS UR QL SCN: NEGATIVE QUAL
CHLORIDE SERPL-SCNC: 97 MMOL/L (ref 98–107)
CO2 SERPL-SCNC: 27.4 MMOL/L (ref 21–32)
COLOR UR: YELLOW
CREAT SERPL-MCNC: 0.7 MG/DL (ref 0.6–1.3)
GLUCOSE SERPL-MCNC: 189 MG/DL (ref 74–106)
GLUCOSE SERPL-MCNC: NEGATIVE MG/DL
KETONES UR STRIP-MCNC: NEGATIVE MG/DL
NITRITE UR-MCNC: NEGATIVE MG/ML
OPIATES UR QL SCN: POSITIVE QUAL
OSMOLALITY SERPL CALC.SUM OF ELEC: 278 MOSM/KG (ref 275–300)
PCP UR QL SCN: NEGATIVE QUAL
PH UR STRIP: 5 [PH] (ref 5–6)
POTASSIUM SERPL-SCNC: 5.1 MMOL/L (ref 3.5–5.1)
PROT UR-MCNC: (no result) MG/DL
RBC #/AREA URNS HPF: (no result) /HPF (ref 0–5)
SODIUM SERPL-SCNC: 135 MMOL/L (ref 136–145)
SP GR UR STRIP: 1.02 (ref 1–1.02)
UROBILINOGEN UR-MCNC: NORMAL MG/DL
WBC #/AREA URNS HPF: (no result) /HPF (ref 0–5)
YEAST #/AREA URNS HPF: (no result) /HPF

## 2017-12-20 NOTE — NUR
REC'D LYING IN BED. ALERT AND ORIENTED X4. DENIED PAIN AT THIS TIME. NO
DISTRESS NOTED. INSTRUCTED TO CALL IF NEEDED ANYTHING, VERBALIZED
UNDERSTANING. WILL CONT TO MONITOR. BED LOW, LOCKED, CALL LIGHT IN REACH,
ALARM ON.

## 2017-12-20 NOTE — NUR
Patient Name: DIANE KLEIN
Admission Status: ER
Accout number: [id number]
Admission Date: 2017
: 1965
Admission Diagnosis:
Attending: CLEO POE
Current LOS: 1
 
Anticipated DC Date:
Planned Disposition: Home
Primary Insurance: [employer] O
 
 
Discharge Planning Comments:
CM met with patient alone and spoke in length. She had to be prompted to stay
awake during our 20 minute conversation. Patient stated that she is
independent for the most part and lives with her boyfriend (Viviana Bates) who
she has lived with for 8 years. She lives in his home. She denies having any
family here in [place]. She has 2 children who live in [place]. She works
at [employer] in [place]. She is currently on FMLA from open heart
surgery. She stated that she does her own meds, but sometimes she has her
boyfriend help her with them. I questioned her about the safety at home and
she said that she lives in a safe environment. I explained the importance of
her doing her own medications if she was able to do so and she said that only
sometimes she needs help. She has [facility]. She has a walker, bedside
commode and shower chair at home. CM will continue to follow and assist with
discharge planning .
 
PCP: Deepika
[facility] [address]
Viviana Bates (magy) [phone]
 
 
 
 
 
: Lilia Lombardo
 
* Is the patient Alert and Oriented? Yes  0
* PCP DEEPIKA  0
* Pharmacy [facility]  0
* Preadmission Environment Home with Family  0
* ADLs Independent  0
* Equipment Bedside Commode
Shower Chair
Walker  0
* List name and contact numbers for known caregivers / representatives who
currently or will assist patient after discharge: VIVIANA BATES (HUMBLEIENGABRIELLE)
[phone]  0
* Community resources currently utilized Home Health  0
* Please name any agencies selected above. [facility]  0
* Additional services required to return to the preadmission environment? Yes
0
* Has this patient been hospitalized within the prior 30 days at any hospital?
Yes  0
    Grand Total:  0

## 2017-12-20 NOTE — NUR
ASSESSMENT AS PER ADMIT PACKET. PATIENT IS CONFUSED X3 ANSWERS TO NAME ONLY
MOVES ALL EXTREMITIES. IV PATENT RT IJ 18G NEEDLE WITH D5NS AT 100CC'S/HR.

## 2017-12-20 NOTE — NUR
RECHECKED BLD SUGAR IS . IS RESTING WELL, DENIED NEEDS AT THIS TIME. BED
LOW, LOCKED, CALL LIGHT IN REACH, ALARM ON.

## 2017-12-20 NOTE — NUR
LATE ENTRY 1400
CM RECEIVED A TELEPHONE CALL FROM [facility] TO VERIFY STATUS. CM ASK
TO SPEAK WITH HER HOME HEALTH CM. SPOKE WITH EDITH. SHE HAD VISITED THE
PATIENT ON TUESDAY. SHE WAS CONCERNED THAT THE PATIENT'S BOYFRIEND WAS
ADMINISTERING HER MEDICATIONS. SHE WAS NOT RECEIVING HER "REGULAR" MEDS, ONLY
THE NEW ONES. THE BOYFRIEND ANSWERED MOST OF THE QUESTIONS. SHE SPOKE VERY
LITTLE AS PER THE NURSE. SHE WAS FATIGUED. THE BOYFRIENG HAD ARRANGED MEDS PER
ALPHABETICAL ORDER. BUT DID NOT KNOW HOW TO ADMINISTER THEM. THE NURSE WAS
CONCERNED. SHE CALLED TODAY AND WAS TOLD BY BOYFRIEND THAT SHE HAD BEEN
ADMITTED TO THE HOSPITAL.

## 2017-12-20 NOTE — NUR
REPORT RECEIVED, ASSUMED CARE OF PT. RESTING, EASILY AROUSED. R EJ INFUSING
FLUIDS AS ORDERED, DRSG C/D/I. BED IN LOWEST POSITION, SIDE RAILS UP X 2, CALL
LIGHT WITHIN REACH.

## 2017-12-20 NOTE — NUR
REC'D REPORT FROM DAVID HOLLAND IN THE ER. IS NOT ALERT OR ORIENTED. CAME IN
WITH A BLOOD SUGAR OF 50 IN THE ER, ER STAFF GAVE AND AMP OF D50, BLOOD SUGAR
IS NOW 99, WILL CONT TO MONITOR. KIA GAVE ME HER MEDICATIONS AND ALSO GAVE
HER A HYDROCODONE BEFORE HE LEFT. STATED "I'LL BE BACK IN THE MORNING AROUND
0800 BECAUSE I KNOW THE DOCTORS WILL NOT GET HERE IN TIME FOR HER TO HAVE HER
MORNING MEDS SO I WILL GIVE THEM TO HER." INFORMED HIM THAT THE DOCTOR WOULD
HAVE TO LOOK HER OVER BEFORE HE RESTARTS HOME MEDS, VERBALIZED UNDERSTANDING.

## 2017-12-21 VITALS — DIASTOLIC BLOOD PRESSURE: 74 MMHG | SYSTOLIC BLOOD PRESSURE: 139 MMHG

## 2017-12-21 VITALS — DIASTOLIC BLOOD PRESSURE: 91 MMHG | SYSTOLIC BLOOD PRESSURE: 165 MMHG

## 2017-12-21 VITALS — SYSTOLIC BLOOD PRESSURE: 166 MMHG | DIASTOLIC BLOOD PRESSURE: 91 MMHG

## 2017-12-21 VITALS — SYSTOLIC BLOOD PRESSURE: 153 MMHG | DIASTOLIC BLOOD PRESSURE: 75 MMHG

## 2017-12-21 VITALS — SYSTOLIC BLOOD PRESSURE: 133 MMHG | DIASTOLIC BLOOD PRESSURE: 65 MMHG

## 2017-12-21 VITALS — DIASTOLIC BLOOD PRESSURE: 88 MMHG | SYSTOLIC BLOOD PRESSURE: 148 MMHG

## 2017-12-21 LAB
BASOPHILS NFR BLD AUTO: 0.1 % (ref 0–2)
CRP SERPL-MCNC: 54.5 MG/DL (ref 0–0.9)
EOSINOPHIL NFR BLD: 0.4 % (ref 0–7)
ERYTHROCYTE [DISTWIDTH] IN BLOOD BY AUTOMATED COUNT: 15.3 % (ref 11.5–14.5)
HCT VFR BLD CALC: 29.5 % (ref 36–48)
HGB BLD-MCNC: 9.3 G/DL (ref 12–16)
IMM GRANULOCYTES NFR BLD: 1.1 % (ref 0–5)
LYMPHOCYTES NFR BLD AUTO: 20.1 % (ref 15–50)
MAGNESIUM SERPL-MCNC: 1.2 MG/DL (ref 1.8–2.4)
MCH RBC QN AUTO: 28.6 PG (ref 26–34)
MCHC RBC AUTO-ENTMCNC: 31.5 G/DL (ref 31–37)
MCV RBC: 90.8 FL (ref 80–100)
MONOCYTES NFR BLD: 15.4 % (ref 2–11)
NEUTROPHILS NFR BLD AUTO: 62.9 % (ref 40–80)
NT-PROBNP SERPL-MCNC: 1248 PG/ML (ref 0–125)
PHOSPHATE SERPL-MCNC: 3.8 MG/DL (ref 2.5–4.9)
PLATELET # BLD: 640 10X3/UL (ref 130–400)
PMV BLD AUTO: 8.8 FL (ref 7.4–10.4)
RBC # BLD AUTO: 3.25 10X6/UL (ref 4–5.4)
WBC # BLD AUTO: 7 10X3/UL (ref 4.8–10.8)

## 2017-12-21 NOTE — NUR
PATIENT IS RESTING AT THIS TIME. SITTING UP IN CHAIR. DENIES ANY NEEDS OR
COMPLAINTS AT THIS TIME. CPOC

## 2017-12-21 NOTE — NUR
ASSISTED PATIENT TO THE BATHROOM. GAIT STEADY USING WALKER. TOLD PATIENT TO
PULL THE EMERGENCY BATHROOM STRING WHEN FINSHED AND NOT TO ATTEMPT GETTING UP
UNTIL A STAFF MEMBER COMES TO HELP. SHE VERBALIZED UNDERSTANDING AND AGREED.

## 2017-12-21 NOTE — NUR
PATIENT COMPLAINING THAT HER BOTTOM IS HURTING. ORDERED EGG CRATE MATTRESS AND
APPLIED. REDNESS IS NOTED, BLANCHABLE. INFORMED PATIENT TO STAY OFF BOTTOM.
CPOC

## 2017-12-21 NOTE — HP
PATIENT: DIANE KLEIN                              MEDICAL RECORD: [id number]
ACCOUNT: [id number]                                    LOCATION:D[id number]
: 61                                            ADMISSION DATE: 17
                                                         
 
                             HISTORY AND PHYSICAL EXAMINATION
 
 
HISTORY OF PRESENT ILLNESS:  A 52-year-old  female, brought into the
Emergency Room with hypoglycemic episode.  The patient had 5-vessel bypass last
month.  Was in rehab, had just come home from rehab.  Significant other
assisting with medications.  She also has chronic back pain, was on pain
medications.  After medication confusion with her insulin, was hypoglycemic,
continued to deteriorate.  He brought her in.
 
PAST MEDICAL HISTORY:  Significant for hypertension, coronary artery disease,
status post CABG, insulin-dependent diabetes, hypertension, hypothyroidism,
lupus, chronic back pain.
 
MEDICATIONS:  Reviewed.
 
ALLERGIES:  REPORTED AS CONTRAST DYE AND SULFA DRUGS.
 
REVIEW OF SYSTEMS:  Limited.  The patient is awake.  Very limited historian at
this time.  Denies cephalgia.  Denies visual changes.  Does admit abdominal pain
and back pain or discomfort.
 
PHYSICAL EXAMINATION:
VITAL SIGNS:  Temperature 98.8, temperature on admission was 100.8, heart rate
105, respirations 17, blood pressure 144/81, O2 sats 95% on room air.
GENERAL:  Alert to person.
HEENT:  Head:  Normocephalic, atraumatic.  Eyes:  Pupils equally round and
reactive.  Ears:  Canals patent.  TMs are intact.  Nose:  Nares patent without
drainage.  Throat:  No erythema, no exudates.
NECK:  Supple.  No lymphadenopathy.  No JVD.
HEART:  Regular, tachycardic.
LUNGS:  Clear to auscultation.  Breathing is nonlabored.
ABDOMEN:  Soft.  Mild distention.
EXTREMITIES:  Present times 4.  Trace edema.
NEUROLOGIC:  Limited exam.  No discrete focal deficits.
 
LABORATORY DATA:  Lactic acid is 2.1.  CBC:  White count 6.9, hemoglobin 10.6,
hematocrit 33, platelets 692.  Chemistry shows a sodium of 135, potassium 4.5,
chloride 96, bicarb 31.4, BUN 21, creatinine 0.9, magnesium of 1.3.  AST is 20,
ALT 23, alk phos 520.  Glucose on admission was 50.  No EKG or x-rays were
obtained at admission.  Of concern, significant other had come in twice during
the night to give her pain medications.  I have discussed this with charge nurse
as well as daytime nurse responsible for the patient that no outside medications
would be given during this hospitalization.
 
ASSESSMENT AND PLAN:  Hypoglycemic episode, medication error.  Chronic pain with
possible medication toxicity.  We will obtain urine drug screen with
quantitative levels.  Continue IV fluids, D5 NS, glucose checks 4 times a day. 
We will wean off IV drip as the patient's glucose levels normalize.  We will
restart insulin as indicated.  Low-resistant sliding scale at this time. 
Monitor labs.  Electrolyte protocol.  Medications as ordered.   for
possible longer term placement for rehab.  The patient needs to be higher level
before discharge to home.  With reported abdominal plain likely from
 
 
 
HISTORY AND PHYSICAL                           [id number]    DIANE KLEIN      
 
 
constipation with opiates, we will obtain abdominal x-ray as well.  Supportive
care.
 
TRANSINT:[id number] Voice Confirmation ID: [id number] DOCUMENT ID: [id number]
 
 
                                           
                                           CLEO POE DO            
 
 
 
Electronically Signed by CLEO LEHMAN on 17 at 0759
 
 
 
 
 
 
 
 
 
 
 
 
 
 
 
 
 
 
 
 
 
 
 
 
 
 
 
 
 
 
 
 
 
 
 
CC:                                                             [id number]
DICTATION DATE: 17     :     17 1101      ADM IN  
                                                                              
[facility]                                          
[address] [place] [address]

## 2017-12-21 NOTE — NUR
REC'D LYING IN BED. ALERT AND ORIENTED X4. NO DISTRESS NOTED. REPORTED PAIN IN
ABDOMEN 9/10. CALLED DR. TAPIA AND DR. TOBAR IS ON CALL, CALLED ME BACK AND
STATED TO GIVE  MG TYLENOL Q6H PRN. PUT THE ORDER IN. WILL ADMIN AS
PRESCRIBED. INSTRUCTED TO CALL IF NEEDED ANYTHING, VERBALIZED UNDERSTANDING.
BED LOW, LOCKED, CALL LIGHT IN REACH, ALARM ON.

## 2017-12-21 NOTE — NUR
RECIEVED REPORT ON PATIENT, PATIENT IS SLEEPING AT THIS TIME, NAD NOTED. CHEST
RISES AND FALLS EQUALLY. PATIENT HAS A R IJ CVL WITH D5W INFUSING AT 100ML/HR.
PATIENT IS ST ON MONITOR WITH A RATE . PATIENT AROUSES TO VOICE, DENIES
ANY NEEDS AT THIS TIME. BED LOW AND LOCKED. CALL LIGHT IN REACH. CPOC

## 2017-12-21 NOTE — NUR
PATIENT EATING DINNER AT THIS TIME, ROUNDS MADE. INFORMED PATIENT SHIFT IS
NEAR ENDING. DENIES ANY NEEDS AT THIS TIME. CPOC

## 2017-12-22 VITALS — DIASTOLIC BLOOD PRESSURE: 92 MMHG | SYSTOLIC BLOOD PRESSURE: 180 MMHG

## 2017-12-22 VITALS — DIASTOLIC BLOOD PRESSURE: 88 MMHG | SYSTOLIC BLOOD PRESSURE: 154 MMHG

## 2017-12-22 VITALS — SYSTOLIC BLOOD PRESSURE: 161 MMHG | DIASTOLIC BLOOD PRESSURE: 81 MMHG

## 2017-12-22 LAB
ANION GAP SERPL CALC-SCNC: 14.4 MMOL/L (ref 8–16)
BASOPHILS NFR BLD AUTO: 0.1 % (ref 0–2)
BUN SERPL-MCNC: 13 MG/DL (ref 7–18)
CALCIUM SERPL-MCNC: 8.6 MG/DL (ref 8.5–10.1)
CHLORIDE SERPL-SCNC: 96 MMOL/L (ref 98–107)
CO2 SERPL-SCNC: 27.2 MMOL/L (ref 21–32)
CREAT SERPL-MCNC: 0.6 MG/DL (ref 0.6–1.3)
EOSINOPHIL NFR BLD: 0.8 % (ref 0–7)
ERYTHROCYTE [DISTWIDTH] IN BLOOD BY AUTOMATED COUNT: 15 % (ref 11.5–14.5)
GLUCOSE SERPL-MCNC: 148 MG/DL (ref 74–106)
HCT VFR BLD CALC: 36.4 % (ref 36–48)
HGB BLD-MCNC: 11.5 G/DL (ref 12–16)
IMM GRANULOCYTES NFR BLD: 1.4 % (ref 0–5)
LYMPHOCYTES NFR BLD AUTO: 24.3 % (ref 15–50)
MCH RBC QN AUTO: 29 PG (ref 26–34)
MCHC RBC AUTO-ENTMCNC: 31.6 G/DL (ref 31–37)
MCV RBC: 91.7 FL (ref 80–100)
MONOCYTES NFR BLD: 16.9 % (ref 2–11)
NEUTROPHILS NFR BLD AUTO: 56.5 % (ref 40–80)
OSMOLALITY SERPL CALC.SUM OF ELEC: 270 MOSM/KG (ref 275–300)
PLATELET # BLD: 627 10X3/UL (ref 130–400)
PMV BLD AUTO: 8.8 FL (ref 7.4–10.4)
POTASSIUM SERPL-SCNC: 3.6 MMOL/L (ref 3.5–5.1)
RBC # BLD AUTO: 3.97 10X6/UL (ref 4–5.4)
SODIUM SERPL-SCNC: 134 MMOL/L (ref 136–145)
WBC # BLD AUTO: 7.7 10X3/UL (ref 4.8–10.8)

## 2017-12-22 NOTE — NUR
PT REC'D FROM [facility]. SITTING UP IN CHAIR AT BEDSIDE. AAOX4. RATING
CURRENT PAIN IN ABD 5/10. WILL REASSESS. HEART RATE IS TACHYCARDIC, BUT WITH
REGULAR RHYTHM. TACHYPNEC RR, BUT LUNG SOUNDS CLEAR AND EQUAL BILAT. BOWEL
SOUNDS HYPOACTIVE X4 QUADS. PT HAS MULTIPLE HEALING/HEALED INCISION SITES TO
BLE. ONE SITE TO RLE OPEN AND HAS SEROSANGUINOUS DRAINAGE. SITE RED AND WARM,
BUT NO SWELLING NOTED. DRESSING CHANGED USING 4X4'S AND METAPORE. HEALLING
MIDLINE STERNAL INCISION FREE OF REDNESS AND SWELLING. EJ TO R SIDE OF NECK
FREE OF REDNESS AND SWELLING. SALINE LOCKED WITH SWAB CAP IN PLACE. BED LOW,
CALL LIGHT IN REACH, DENIES NEEDS. CPOC.

## 2017-12-22 NOTE — NUR
PATIENT BEING DISCHARGED HOME, EVITA AT [facility] NOTIFIED ABOUT
DISCHARGE AND INFORMATION FAXED.

## 2017-12-22 NOTE — NUR
DC INSTRUCTIONS DISCUSSED AT THIS TIME. NO QUESTIONS OR CONCERNS VOICED AT
THIS TIME. PT COMPLAINING OF PAIN IN HER ABD. STATES SHE HAS HAD THIS PAIN
SINCE SHE HAD HER BYPASS LAST MONTH. INFORMED PT THAT SHE NEEDED TO CONTACT
HER CARDIOLOGIST AND SET UP AN APPOINTMENT IF POSSIBLE. PT STATES SHE
UNDERSTANDS. EXTERNAL JUGULAR DC'D WITH CATHETER INTACT. PRESSURE AND DRESSING
APPLIED. ESCORTED OUT VIA WC BY VOLUNTEER.

## 2017-12-28 NOTE — DS
PATIENT:DIANE KLEIN                :61   MEDICAL RECORD: [id number]
 
                              DISCHARGE SUMMARY
                                                         
ADMISSION DATE:    17                       DISCHARGE DATE:     17
 
 
DATE OF ADMISSION:  2017.
 
DATE OF DISCHARGE:  2017.
 
ADMISSION DIAGNOSES:  Hypoglycemia secondary to home medications error,
oversedation, urinary tract infection status post CABG.
 
DISCHARGE DIAGNOSES:  Hypoglycemia secondary to home error at home, resolved;
urinary tract infection; insulin-dependent diabetes mellitus, status post
coronary artery bypass graft.
 
HOSPITAL COURSE:  The patient had an uneventful hospital course.  Pain
medications were held.  She was placed on D5 half normal IV.  Glucose monitored
resolved as home insulin wore off, restarted on a lower dose of insulin, sugars
satisfactorily maintained.  Started on antibiotics for urinary tract infection. 
Final cultures pending.  Physical therapy consulted to help with ambulation. 
The patient ambulating independently.  The patient is discharged home in
significantly improved condition.
 
DISCHARGE MEDICATIONS:  Per med rec.
 
PHYSICAL EXAMINATION:
VITAL SIGNS ON DISCHARGE:  Temperature 98.6, blood pressure 154/88, heart rate
106, respirations 17, O2 sat is 97%.
GENERAL:  Alert and oriented, in no present distress.
HEART:  Regular rate and rhythm.
LUNGS:  Clear.
ABDOMEN:  Soft.
EXTREMITIES:  Present times 4.
NEUROLOGIC:  Intact.
 
LABORATORY DATA:  Glucose on discharge 159.  CBC:  White count 7.7, hemoglobin
11.5, hematocrit 36.4, platelets 627.  Chemistry shows a sodium of 134,
potassium 3.6, chloride 96, bicarbonate 27.2, BUN 13, creatinine 0.6.
 
DISCHARGE INSTRUCTIONS:  The patient will follow up in the clinic in 10 days and
as per med rec we will hold pain medications and sedatives to avoid confusion,
increase activity as tolerated.
 
TRANSINT:[id number] Voice Confirmation ID: [id number] DOCUMENT ID: [id number]
                                           
                                           CLEO POE DO            
 
 
 
Electronically Signed by CLEO LEHMAN on 17 at 0739
CC:                                                             [id number]
DICTATION DATE: 17 0756     :     17 0944      DIS IN  
                                                                      17
[facility]                                          
[address] [place] [address]

## 2018-01-26 NOTE — NUR
Posterior Auricular Interpolation Flap Text: A decision was made to reconstruct the defect utilizing an interpolation axial flap and a staged reconstruction.  A telfa template was made of the defect.  This telfa template was then used to outline the posterior auricular interpolation flap.  The donor area for the pedicle flap was then injected with anesthesia.  The flap was excised through the skin and subcutaneous tissue down to the layer of the underlying musculature.  The pedicle flap was carefully excised within this deep plane to maintain its blood supply.  The edges of the donor site were undermined.   The donor site was closed in a primary fashion.  The pedicle was then rotated into position and sutured.  Once the tube was sutured into place, adequate blood supply was confirmed with blanching and refill.  The pedicle was then wrapped with xeroform gauze and dressed appropriately with a telfa and gauze bandage to ensure continued blood supply and protect the attached pedicle. TURNED AND REPOSITIONED. NO DISTRESS SEEN. ALL PRESSURES WITHIN PARAMETERS. V
PACED, ATRIAL SENSED. ST ON THE MONITOR. RESP UNLABORED. HOB UP. REMAINS IN
1:1 NURSE MONITORING. CONT CURRENT POC. Split-Thickness Skin Graft Text: The defect edges were debeveled with a #15 scalpel blade.  Given the location of the defect, shape of the defect and the proximity to free margins a split thickness skin graft was deemed most appropriate.  Using a sterile surgical marker, the primary defect shape was transferred to the donor site. The split thickness graft was then harvested.  The skin graft was then placed in the primary defect and oriented appropriately. Island Pedicle Flap-Requiring Vessel Identification Text: The defect edges were debeveled with a #15 scalpel blade.  Given the location of the defect, shape of the defect and the proximity to free margins an island pedicle advancement flap was deemed most appropriate.  Using a sterile surgical marker, an appropriate advancement flap was drawn, based on the axial vessel mentioned above, incorporating the defect, outlining the appropriate donor tissue and placing the expected incisions within the relaxed skin tension lines where possible.    The area thus outlined was incised deep to adipose tissue with a #15 scalpel blade.  The skin margins were undermined to an appropriate distance in all directions around the primary defect and laterally outward around the island pedicle utilizing iris scissors.  There was minimal undermining beneath the pedicle flap. Purse String (Intermediate) Text: Given the location of the defect and the characteristics of the surrounding skin a pursestring intermediate closure was deemed most appropriate.  Undermining was performed circumfirentially around the surgical defect.  A purstring suture was then placed and tightened. Dermal Autograft Text: The defect edges were debeveled with a #15 scalpel blade.  Given the location of the defect, shape of the defect and the proximity to free margins a dermal autograft was deemed most appropriate.  Using a sterile surgical marker, the primary defect shape was transferred to the donor site. The area thus outlined was incised deep to adipose tissue with a #15 scalpel blade.  The harvested graft was then trimmed of adipose and epidermal tissue until only dermis was left.  The skin graft was then placed in the primary defect and oriented appropriately. Saucerization Excision Additional Text (Leave Blank If You Do Not Want): The margin was drawn around the clinically apparent lesion.  Incisions were then made along these lines, in a tangential fashion, to the appropriate tissue plane and the lesion was extirpated. Muscle Hinge Flap Text: The defect edges were debeveled with a #15 scalpel blade.  Given the size, depth and location of the defect and the proximity to free margins a muscle hinge flap was deemed most appropriate.  Using a sterile surgical marker, an appropriate hinge flap was drawn incorporating the defect. The area thus outlined was incised with a #15 scalpel blade.  The skin margins were undermined to an appropriate distance in all directions utilizing iris scissors. Render The Repair Note As A Separate Paragraph: No Secondary Defect Width (In Cm): 0 Scalpel Size: 15 blade Complex Repair And Dorsal Nasal Flap Text: The defect edges were debeveled with a #15 scalpel blade.  The primary defect was closed partially with a complex linear closure.  Given the location of the remaining defect, shape of the defect and the proximity to free margins a dorsal nasal flap was deemed most appropriate for complete closure of the defect.  Using a sterile surgical marker, an appropriate flap was drawn incorporating the defect and placing the expected incisions within the relaxed skin tension lines where possible.    The area thus outlined was incised deep to adipose tissue with a #15 scalpel blade.  The skin margins were undermined to an appropriate distance in all directions utilizing iris scissors. Mucosal Advancement Flap Text: Given the location of the defect, shape of the defect and the proximity to free margins a mucosal advancement flap was deemed most appropriate. Incisions were made with a 15 blade scalpel in the appropriate fashion along the cutaneous vermillion border and the mucosal lip. The remaining actinically damaged mucosal tissue was excised.  The mucosal advancement flap was then elevated to the gingival sulcus with care taken to preserve the neurovascular structures and advanced into the primary defect. Care was taken to ensure that precise realignment of the vermillion border was achieved. Detail Level: Detailed A-T Advancement Flap Text: The defect edges were debeveled with a #15 scalpel blade.  Given the location of the defect, shape of the defect and the proximity to free margins an A-T advancement flap was deemed most appropriate.  Using a sterile surgical marker, an appropriate advancement flap was drawn incorporating the defect and placing the expected incisions within the relaxed skin tension lines where possible.    The area thus outlined was incised deep to adipose tissue with a #15 scalpel blade.  The skin margins were undermined to an appropriate distance in all directions utilizing iris scissors. Complex Repair And Epidermal Autograft Text: The defect edges were debeveled with a #15 scalpel blade.  The primary defect was closed partially with a complex linear closure.  Given the location of the defect, shape of the defect and the proximity to free margins an epidermal autograft was deemed most appropriate to repair the remaining defect.  The graft was trimmed to fit the size of the remaining defect.  The graft was then placed in the primary defect, oriented appropriately, and sutured into place. Perilesional Excision Additional Text (Leave Blank If You Do Not Want): The margin was drawn around the clinically apparent lesion. Incisions were then made along these lines to the appropriate tissue plane and the lesion was extirpated. Complex Repair And Double Advancement Flap Text: The defect edges were debeveled with a #15 scalpel blade.  The primary defect was closed partially with a complex linear closure.  Given the location of the remaining defect, shape of the defect and the proximity to free margins a double advancement flap was deemed most appropriate for complete closure of the defect.  Using a sterile surgical marker, an appropriate advancement flap was drawn incorporating the defect and placing the expected incisions within the relaxed skin tension lines where possible.    The area thus outlined was incised deep to adipose tissue with a #15 scalpel blade.  The skin margins were undermined to an appropriate distance in all directions utilizing iris scissors. Interpolation Flap Text: A decision was made to reconstruct the defect utilizing an interpolation axial flap and a staged reconstruction.  A telfa template was made of the defect.  This telfa template was then used to outline the interpolation flap.  The donor area for the pedicle flap was then injected with anesthesia.  The flap was excised through the skin and subcutaneous tissue down to the layer of the underlying musculature.  The interpolation flap was carefully excised within this deep plane to maintain its blood supply.  The edges of the donor site were undermined.   The donor site was closed in a primary fashion.  The pedicle was then rotated into position and sutured.  Once the tube was sutured into place, adequate blood supply was confirmed with blanching and refill.  The pedicle was then wrapped with xeroform gauze and dressed appropriately with a telfa and gauze bandage to ensure continued blood supply and protect the attached pedicle. O-Z Plasty Text: The defect edges were debeveled with a #15 scalpel blade.  Given the location of the defect, shape of the defect and the proximity to free margins an O-Z plasty (double transposition flap) was deemed most appropriate.  Using a sterile surgical marker, the appropriate transposition flaps were drawn incorporating the defect and placing the expected incisions within the relaxed skin tension lines where possible.    The area thus outlined was incised deep to adipose tissue with a #15 scalpel blade.  The skin margins were undermined to an appropriate distance in all directions utilizing iris scissors.  Hemostasis was achieved with electrocautery.  The flaps were then transposed into place, one clockwise and the other counterclockwise, and anchored with interrupted buried subcutaneous sutures. Star Wedge Flap Text: The defect edges were debeveled with a #15 scalpel blade.  Given the location of the defect, shape of the defect and the proximity to free margins a star wedge flap was deemed most appropriate.  Using a sterile surgical marker, an appropriate rotation flap was drawn incorporating the defect and placing the expected incisions within the relaxed skin tension lines where possible. The area thus outlined was incised deep to adipose tissue with a #15 scalpel blade.  The skin margins were undermined to an appropriate distance in all directions utilizing iris scissors. O-L Flap Text: The defect edges were debeveled with a #15 scalpel blade.  Given the location of the defect, shape of the defect and the proximity to free margins an O-L flap was deemed most appropriate.  Using a sterile surgical marker, an appropriate advancement flap was drawn incorporating the defect and placing the expected incisions within the relaxed skin tension lines where possible.    The area thus outlined was incised deep to adipose tissue with a #15 scalpel blade.  The skin margins were undermined to an appropriate distance in all directions utilizing iris scissors. Bilobed Flap Text: The defect edges were debeveled with a #15 scalpel blade.  Given the location of the defect and the proximity to free margins a bilobe flap was deemed most appropriate.  Using a sterile surgical marker, an appropriate bilobe flap drawn around the defect.    The area thus outlined was incised deep to adipose tissue with a #15 scalpel blade.  The skin margins were undermined to an appropriate distance in all directions utilizing iris scissors. Complex Repair And Melolabial Flap Text: The defect edges were debeveled with a #15 scalpel blade.  The primary defect was closed partially with a complex linear closure.  Given the location of the remaining defect, shape of the defect and the proximity to free margins a melolabial flap was deemed most appropriate for complete closure of the defect.  Using a sterile surgical marker, an appropriate advancement flap was drawn incorporating the defect and placing the expected incisions within the relaxed skin tension lines where possible.    The area thus outlined was incised deep to adipose tissue with a #15 scalpel blade.  The skin margins were undermined to an appropriate distance in all directions utilizing iris scissors. Hemostasis: Electrocautery Post-Care Instructions: I reviewed with the patient in detail post-care instructions. Patient is not to engage in any heavy lifting, exercise, or swimming for the next 14 days. Should the patient develop any fevers, chills, bleeding, severe pain patient will contact the office immediately. Epidermal Closure Graft Donor Site (Optional): simple interrupted V-Y Plasty Text: The defect edges were debeveled with a #15 scalpel blade.  Given the location of the defect, shape of the defect and the proximity to free margins an V-Y advancement flap was deemed most appropriate.  Using a sterile surgical marker, an appropriate advancement flap was drawn incorporating the defect and placing the expected incisions within the relaxed skin tension lines where possible.    The area thus outlined was incised deep to adipose tissue with a #15 scalpel blade.  The skin margins were undermined to an appropriate distance in all directions utilizing iris scissors. Complex Repair And Rhombic Flap Text: The defect edges were debeveled with a #15 scalpel blade.  The primary defect was closed partially with a complex linear closure.  Given the location of the remaining defect, shape of the defect and the proximity to free margins a rhombic flap was deemed most appropriate for complete closure of the defect.  Using a sterile surgical marker, an appropriate advancement flap was drawn incorporating the defect and placing the expected incisions within the relaxed skin tension lines where possible.    The area thus outlined was incised deep to adipose tissue with a #15 scalpel blade.  The skin margins were undermined to an appropriate distance in all directions utilizing iris scissors. Epidermal Autograft Text: The defect edges were debeveled with a #15 scalpel blade.  Given the location of the defect, shape of the defect and the proximity to free margins an epidermal autograft was deemed most appropriate.  Using a sterile surgical marker, the primary defect shape was transferred to the donor site. The epidermal graft was then harvested.  The skin graft was then placed in the primary defect and oriented appropriately. Accession #: [id number] Anesthesia Type: 1% lidocaine with epinephrine and a 1:10 solution of 8.4% sodium bicarbonate Lazy S Intermediate Repair Preamble Text (Leave Blank If You Do Not Want): Undermining was performed with blunt dissection. Keystone Flap Text: The defect edges were debeveled with a #15 scalpel blade.  Given the location of the defect, shape of the defect a keystone flap was deemed most appropriate.  Using a sterile surgical marker, an appropriate keystone flap was drawn incorporating the defect, outlining the appropriate donor tissue and placing the expected incisions within the relaxed skin tension lines where possible. The area thus outlined was incised deep to adipose tissue with a #15 scalpel blade.  The skin margins were undermined to an appropriate distance in all directions around the primary defect and laterally outward around the flap utilizing iris scissors. Anesthesia Volume In Cc: 5 Show Additional Anesthesia Variables: Yes Additional Secondary Defect Length (In Cm): - Alar Island Pedicle Flap Text: The defect edges were debeveled with a #15 scalpel blade.  Given the location of the defect, shape of the defect and the proximity to the alar rim an island pedicle advancement flap was deemed most appropriate.  Using a sterile surgical marker, an appropriate advancement flap was drawn incorporating the defect, outlining the appropriate donor tissue and placing the expected incisions within the nasal ala running parallel to the alar rim. The area thus outlined was incised with a #15 scalpel blade.  The skin margins were undermined minimally to an appropriate distance in all directions around the primary defect and laterally outward around the island pedicle utilizing iris scissors.  There was minimal undermining beneath the pedicle flap. Transposition Flap Text: The defect edges were debeveled with a #15 scalpel blade.  Given the location of the defect and the proximity to free margins a transposition flap was deemed most appropriate.  Using a sterile surgical marker, an appropriate transposition flap was drawn incorporating the defect.    The area thus outlined was incised deep to adipose tissue with a #15 scalpel blade.  The skin margins were undermined to an appropriate distance in all directions utilizing iris scissors. Complex Repair And Modified Advancement Flap Text: The defect edges were debeveled with a #15 scalpel blade.  The primary defect was closed partially with a complex linear closure.  Given the location of the remaining defect, shape of the defect and the proximity to free margins a modified advancement flap was deemed most appropriate for complete closure of the defect.  Using a sterile surgical marker, an appropriate advancement flap was drawn incorporating the defect and placing the expected incisions within the relaxed skin tension lines where possible.    The area thus outlined was incised deep to adipose tissue with a #15 scalpel blade.  The skin margins were undermined to an appropriate distance in all directions utilizing iris scissors. Fusiform Excision Additional Text (Leave Blank If You Do Not Want): The margin was drawn around the clinically apparent lesion.  A fusiform shape was then drawn on the skin incorporating the lesion and margins.  Incisions were then made along these lines to the appropriate tissue plane and the lesion was extirpated. Xenograft Text: The defect edges were debeveled with a #15 scalpel blade.  Given the location of the defect, shape of the defect and the proximity to free margins a xenograft was deemed most appropriate.  The graft was then trimmed to fit the size of the defect.  The graft was then placed in the primary defect and oriented appropriately. Excision Depth: adipose tissue Complex Repair And Double M Plasty Text: The defect edges were debeveled with a #15 scalpel blade.  The primary defect was closed partially with a complex linear closure.  Given the location of the remaining defect, shape of the defect and the proximity to free margins a double M plasty was deemed most appropriate for complete closure of the defect.  Using a sterile surgical marker, an appropriate advancement flap was drawn incorporating the defect and placing the expected incisions within the relaxed skin tension lines where possible.    The area thus outlined was incised deep to adipose tissue with a #15 scalpel blade.  The skin margins were undermined to an appropriate distance in all directions utilizing iris scissors. Complex Repair Preamble Text (Leave Blank If You Do Not Want): Extensive wide undermining was performed. Dorsal Nasal Flap Text: The defect edges were debeveled with a #15 scalpel blade.  Given the location of the defect and the proximity to free margins a dorsal nasal flap was deemed most appropriate.  Using a sterile surgical marker, an appropriate dorsal nasal flap was drawn around the defect.    The area thus outlined was incised deep to adipose tissue with a #15 scalpel blade.  The skin margins were undermined to an appropriate distance in all directions utilizing iris scissors. Spiral Flap Text: The defect edges were debeveled with a #15 scalpel blade.  Given the location of the defect, shape of the defect and the proximity to free margins a spiral flap was deemed most appropriate.  Using a sterile surgical marker, an appropriate rotation flap was drawn incorporating the defect and placing the expected incisions within the relaxed skin tension lines where possible. The area thus outlined was incised deep to adipose tissue with a #15 scalpel blade.  The skin margins were undermined to an appropriate distance in all directions utilizing iris scissors. Complex Repair And Dermal Autograft Text: The defect edges were debeveled with a #15 scalpel blade.  The primary defect was closed partially with a complex linear closure.  Given the location of the defect, shape of the defect and the proximity to free margins an dermal autograft was deemed most appropriate to repair the remaining defect.  The graft was trimmed to fit the size of the remaining defect.  The graft was then placed in the primary defect, oriented appropriately, and sutured into place. Cheek-To-Nose Interpolation Flap Text: A decision was made to reconstruct the defect utilizing an interpolation axial flap and a staged reconstruction.  A telfa template was made of the defect.  This telfa template was then used to outline the Cheek-To-Nose Interpolation flap.  The donor area for the pedicle flap was then injected with anesthesia.  The flap was excised through the skin and subcutaneous tissue down to the layer of the underlying musculature.  The interpolation flap was carefully excised within this deep plane to maintain its blood supply.  The edges of the donor site were undermined.   The donor site was closed in a primary fashion.  The pedicle was then rotated into position and sutured.  Once the tube was sutured into place, adequate blood supply was confirmed with blanching and refill.  The pedicle was then wrapped with xeroform gauze and dressed appropriately with a telfa and gauze bandage to ensure continued blood supply and protect the attached pedicle. Z Plasty Text: The lesion was extirpated to the level of the fat with a #15 scalpel blade.  Given the location of the defect, shape of the defect and the proximity to free margins a Z-plasty was deemed most appropriate for repair.  Using a sterile surgical marker, the appropriate transposition arms of the Z-plasty were drawn incorporating the defect and placing the expected incisions within the relaxed skin tension lines where possible.    The area thus outlined was incised deep to adipose tissue with a #15 scalpel blade.  The skin margins were undermined to an appropriate distance in all directions utilizing iris scissors.  The opposing transposition arms were then transposed into place in opposite direction and anchored with interrupted buried subcutaneous sutures. Ftsg Text: The defect edges were debeveled with a #15 scalpel blade.  Given the location of the defect, shape of the defect and the proximity to free margins a full thickness skin graft was deemed most appropriate.  Using a sterile surgical marker, the primary defect shape was transferred to the donor site. The area thus outlined was incised deep to adipose tissue with a #15 scalpel blade.  The harvested graft was then trimmed of adipose tissue until only dermis and epidermis was left.  The skin margins of the secondary defect were undermined to an appropriate distance in all directions utilizing iris scissors.  The secondary defect was closed with interrupted buried subcutaneous sutures.  The skin edges were then re-apposed with running  sutures.  The skin graft was then placed in the primary defect and oriented appropriately. Complex Repair And Xenograft Text: The defect edges were debeveled with a #15 scalpel blade.  The primary defect was closed partially with a complex linear closure.  Given the location of the defect, shape of the defect and the proximity to free margins an tissue cultured epidermal autograft was deemed most appropriate to repair the remaining defect.  The graft was trimmed to fit the size of the remaining defect.  The graft was then placed in the primary defect, oriented appropriately, and sutured into place. Size Of Lesion In Cm: 0.8 Complex Repair And M Plasty Text: The defect edges were debeveled with a #15 scalpel blade.  The primary defect was closed partially with a complex linear closure.  Given the location of the remaining defect, shape of the defect and the proximity to free margins an M plasty was deemed most appropriate for complete closure of the defect.  Using a sterile surgical marker, an appropriate advancement flap was drawn incorporating the defect and placing the expected incisions within the relaxed skin tension lines where possible.    The area thus outlined was incised deep to adipose tissue with a #15 scalpel blade.  The skin margins were undermined to an appropriate distance in all directions utilizing iris scissors. Intermediate / Complex Repair - Final Wound Length In Cm: 4.5 Mastoid Interpolation Flap Text: A decision was made to reconstruct the defect utilizing an interpolation axial flap and a staged reconstruction.  A telfa template was made of the defect.  This telfa template was then used to outline the mastoid interpolation flap.  The donor area for the pedicle flap was then injected with anesthesia.  The flap was excised through the skin and subcutaneous tissue down to the layer of the underlying musculature.  The pedicle flap was carefully excised within this deep plane to maintain its blood supply.  The edges of the donor site were undermined.   The donor site was closed in a primary fashion.  The pedicle was then rotated into position and sutured.  Once the tube was sutured into place, adequate blood supply was confirmed with blanching and refill.  The pedicle was then wrapped with xeroform gauze and dressed appropriately with a telfa and gauze bandage to ensure continued blood supply and protect the attached pedicle. Tissue Cultured Epidermal Autograft Text: The defect edges were debeveled with a #15 scalpel blade.  Given the location of the defect, shape of the defect and the proximity to free margins a tissue cultured epidermal autograft was deemed most appropriate.  The graft was then trimmed to fit the size of the defect.  The graft was then placed in the primary defect and oriented appropriately. Ear Star Wedge Flap Text: The defect edges were debeveled with a #15 blade scalpel.  Given the location of the defect and the proximity to free margins (helical rim) an ear star wedge flap was deemed most appropriate.  Using a sterile surgical marker, the appropriate flap was drawn incorporating the defect and placing the expected incisions between the helical rim and antihelix where possible.  The area thus outlined was incised through and through with a #15 scalpel blade. Repair Type: Intermediate Hatchet Flap Text: The defect edges were debeveled with a #15 scalpel blade.  Given the location of the defect, shape of the defect and the proximity to free margins a hatchet flap was deemed most appropriate.  Using a sterile surgical marker, an appropriate hatchet flap was drawn incorporating the defect and placing the expected incisions within the relaxed skin tension lines where possible.    The area thus outlined was incised deep to adipose tissue with a #15 scalpel blade.  The skin margins were undermined to an appropriate distance in all directions utilizing iris scissors. Bilobed Transposition Flap Text: The defect edges were debeveled with a #15 scalpel blade.  Given the location of the defect and the proximity to free margins a bilobed transposition flap was deemed most appropriate.  Using a sterile surgical marker, an appropriate bilobe flap drawn around the defect.    The area thus outlined was incised deep to adipose tissue with a #15 scalpel blade.  The skin margins were undermined to an appropriate distance in all directions utilizing iris scissors. Complex Repair And Transposition Flap Text: The defect edges were debeveled with a #15 scalpel blade.  The primary defect was closed partially with a complex linear closure.  Given the location of the remaining defect, shape of the defect and the proximity to free margins a transposition flap was deemed most appropriate for complete closure of the defect.  Using a sterile surgical marker, an appropriate advancement flap was drawn incorporating the defect and placing the expected incisions within the relaxed skin tension lines where possible.    The area thus outlined was incised deep to adipose tissue with a #15 scalpel blade.  The skin margins were undermined to an appropriate distance in all directions utilizing iris scissors. Melolabial Transposition Flap Text: The defect edges were debeveled with a #15 scalpel blade.  Given the location of the defect and the proximity to free margins a melolabial flap was deemed most appropriate.  Using a sterile surgical marker, an appropriate melolabial transposition flap was drawn incorporating the defect.    The area thus outlined was incised deep to adipose tissue with a #15 scalpel blade.  The skin margins were undermined to an appropriate distance in all directions utilizing iris scissors. O-T Plasty Text: The defect edges were debeveled with a #15 scalpel blade.  Given the location of the defect, shape of the defect and the proximity to free margins an O-T plasty was deemed most appropriate.  Using a sterile surgical marker, an appropriate O-T plasty was drawn incorporating the defect and placing the expected incisions within the relaxed skin tension lines where possible.    The area thus outlined was incised deep to adipose tissue with a #15 scalpel blade.  The skin margins were undermined to an appropriate distance in all directions utilizing iris scissors. Lip Wedge Excision Repair Text: Given the location of the defect and the proximity to free margins a full thickness wedge repair was deemed most appropriate.  Using a sterile surgical marker, the appropriate repair was drawn incorporating the defect and placing the expected incisions perpendicular to the vermillion border.  The vermillion border was also meticulously outlined to ensure appropriate reapproximation during the repair.  The area thus outlined was incised through and through with a #15 scalpel blade.  The muscularis and dermis were reaproximated with deep sutures following hemostasis. Care was taken to realign the vermillion border before proceeding with the superficial closure.  Once the vermillion was realigned the superfical and mucosal closure was finished. Consent was obtained from the patient. The risks and benefits to therapy were discussed in detail. Specifically, the risks of infection, scarring, bleeding, prolonged wound healing, incomplete removal, allergy to anesthesia, nerve injury and recurrence were addressed. Prior to the procedure, the treatment site was clearly identified and confirmed by the patient. S Plasty Text: Given the location and shape of the defect, and the orientation of relaxed skin tension lines, an S-plasty was deemed most appropriate for repair.  Using a sterile surgical marker, the appropriate outline of the S-plasty was drawn, incorporating the defect and placing the expected incisions within the relaxed skin tension lines where possible.  The area thus outlined was incised deep to adipose tissue with a #15 scalpel blade.  The skin margins were undermined to an appropriate distance in all directions utilizing iris scissors. The skin flaps were advanced over the defect.  The opposing margins were then approximated with interrupted buried subcutaneous sutures. Complex Repair And Z Plasty Text: The defect edges were debeveled with a #15 scalpel blade.  The primary defect was closed partially with a complex linear closure.  Given the location of the remaining defect, shape of the defect and the proximity to free margins a Z plasty was deemed most appropriate for complete closure of the defect.  Using a sterile surgical marker, an appropriate advancement flap was drawn incorporating the defect and placing the expected incisions within the relaxed skin tension lines where possible.    The area thus outlined was incised deep to adipose tissue with a #15 scalpel blade.  The skin margins were undermined to an appropriate distance in all directions utilizing iris scissors. Island Pedicle Flap With Canthal Suspension Text: The defect edges were debeveled with a #15 scalpel blade.  Given the location of the defect, shape of the defect and the proximity to free margins an island pedicle advancement flap was deemed most appropriate.  Using a sterile surgical marker, an appropriate advancement flap was drawn incorporating the defect, outlining the appropriate donor tissue and placing the expected incisions within the relaxed skin tension lines where possible. The area thus outlined was incised deep to adipose tissue with a #15 scalpel blade.  The skin margins were undermined to an appropriate distance in all directions around the primary defect and laterally outward around the island pedicle utilizing iris scissors.  There was minimal undermining beneath the pedicle flap. A suspension suture was placed in the canthal tendon to prevent tension and prevent ectropion. Complex Repair And Bilobe Flap Text: The defect edges were debeveled with a #15 scalpel blade.  The primary defect was closed partially with a complex linear closure.  Given the location of the remaining defect, shape of the defect and the proximity to free margins a bilobe flap was deemed most appropriate for complete closure of the defect.  Using a sterile surgical marker, an appropriate advancement flap was drawn incorporating the defect and placing the expected incisions within the relaxed skin tension lines where possible.    The area thus outlined was incised deep to adipose tissue with a #15 scalpel blade.  The skin margins were undermined to an appropriate distance in all directions utilizing iris scissors. Deep Sutures: 4-0 Vicryl Complex Repair And V-Y Plasty Text: The defect edges were debeveled with a #15 scalpel blade.  The primary defect was closed partially with a complex linear closure.  Given the location of the remaining defect, shape of the defect and the proximity to free margins a V-Y plasty was deemed most appropriate for complete closure of the defect.  Using a sterile surgical marker, an appropriate advancement flap was drawn incorporating the defect and placing the expected incisions within the relaxed skin tension lines where possible.    The area thus outlined was incised deep to adipose tissue with a #15 scalpel blade.  The skin margins were undermined to an appropriate distance in all directions utilizing iris scissors. Rotation Flap Text: The defect edges were debeveled with a #15 scalpel blade.  Given the location of the defect, shape of the defect and the proximity to free margins a rotation flap was deemed most appropriate.  Using a sterile surgical marker, an appropriate rotation flap was drawn incorporating the defect and placing the expected incisions within the relaxed skin tension lines where possible.    The area thus outlined was incised deep to adipose tissue with a #15 scalpel blade.  The skin margins were undermined to an appropriate distance in all directions utilizing iris scissors. Burow's Advancement Flap Text: The defect edges were debeveled with a #15 scalpel blade.  Given the location of the defect and the proximity to free margins a Burow's advancement flap was deemed most appropriate.  Using a sterile surgical marker, the appropriate advancement flap was drawn incorporating the defect and placing the expected incisions within the relaxed skin tension lines where possible.    The area thus outlined was incised deep to adipose tissue with a #15 scalpel blade.  The skin margins were undermined to an appropriate distance in all directions utilizing iris scissors. Repair Performed By Another Provider Text (Leave Blank If You Do Not Want): After the tissue was excised the defect was repaired by another provider. W Plasty Text: The lesion was extirpated to the level of the fat with a #15 scalpel blade.  Given the location of the defect, shape of the defect and the proximity to free margins a W-plasty was deemed most appropriate for repair.  Using a sterile surgical marker, the appropriate transposition arms of the W-plasty were drawn incorporating the defect and placing the expected incisions within the relaxed skin tension lines where possible.    The area thus outlined was incised deep to adipose tissue with a #15 scalpel blade.  The skin margins were undermined to an appropriate distance in all directions utilizing iris scissors.  The opposing transposition arms were then transposed into place in opposite direction and anchored with interrupted buried subcutaneous sutures. Complex Repair And Rotation Flap Text: The defect edges were debeveled with a #15 scalpel blade.  The primary defect was closed partially with a complex linear closure.  Given the location of the remaining defect, shape of the defect and the proximity to free margins a rotation flap was deemed most appropriate for complete closure of the defect.  Using a sterile surgical marker, an appropriate advancement flap was drawn incorporating the defect and placing the expected incisions within the relaxed skin tension lines where possible.    The area thus outlined was incised deep to adipose tissue with a #15 scalpel blade.  The skin margins were undermined to an appropriate distance in all directions utilizing iris scissors. Trilobed Flap Text: The defect edges were debeveled with a #15 scalpel blade.  Given the location of the defect and the proximity to free margins a trilobed flap was deemed most appropriate.  Using a sterile surgical marker, an appropriate trilobed flap drawn around the defect.    The area thus outlined was incised deep to adipose tissue with a #15 scalpel blade.  The skin margins were undermined to an appropriate distance in all directions utilizing iris scissors. Complex Repair And Skin Substitute Graft Text: The defect edges were debeveled with a #15 scalpel blade.  The primary defect was closed partially with a complex linear closure.  Given the location of the remaining defect, shape of the defect and the proximity to free margins a skin substitute graft was deemed most appropriate to repair the remaining defect.  The graft was trimmed to fit the size of the remaining defect.  The graft was then placed in the primary defect, oriented appropriately, and sutured into place. Island Pedicle Flap Text: The defect edges were debeveled with a #15 scalpel blade.  Given the location of the defect, shape of the defect and the proximity to free margins an island pedicle advancement flap was deemed most appropriate.  Using a sterile surgical marker, an appropriate advancement flap was drawn incorporating the defect, outlining the appropriate donor tissue and placing the expected incisions within the relaxed skin tension lines where possible.    The area thus outlined was incised deep to adipose tissue with a #15 scalpel blade.  The skin margins were undermined to an appropriate distance in all directions around the primary defect and laterally outward around the island pedicle utilizing iris scissors.  There was minimal undermining beneath the pedicle flap. Slit Excision Additional Text (Leave Blank If You Do Not Want): A linear line was drawn on the skin overlying the lesion. An incision was made slowly until the lesion was visualized.  Once visualized, the lesion was removed with blunt dissection. Complex Repair And Split-Thickness Skin Graft Text: The defect edges were debeveled with a #15 scalpel blade.  The primary defect was closed partially with a complex linear closure.  Given the location of the defect, shape of the defect and the proximity to free margins a split thickness skin graft was deemed most appropriate to repair the remaining defect.  The graft was trimmed to fit the size of the remaining defect.  The graft was then placed in the primary defect, oriented appropriately, and sutured into place. Excisional Biopsy Additional Text (Leave Blank If You Do Not Want): The margin was drawn around the clinically apparent lesion.  An elliptical shape was then drawn on the skin incorporating the lesion and margins.  Incisions were then made along these lines to the appropriate tissue plane and the lesion was extirpated. Billing Type: Third-Party Bill Helical Rim Advancement Flap Text: The defect edges were debeveled with a #15 blade scalpel.  Given the location of the defect and the proximity to free margins (helical rim) a double helical rim advancement flap was deemed most appropriate.  Using a sterile surgical marker, the appropriate advancement flaps were drawn incorporating the defect and placing the expected incisions between the helical rim and antihelix where possible.  The area thus outlined was incised through and through with a #15 scalpel blade.  With a skin hook and iris scissors, the flaps were gently and sharply undermined and freed up. Suture Removal: 14 days Bi-Rhombic Flap Text: The defect edges were debeveled with a #15 scalpel blade.  Given the location of the defect and the proximity to free margins a bi-rhombic flap was deemed most appropriate.  Using a sterile surgical marker, an appropriate rhombic flap was drawn incorporating the defect. The area thus outlined was incised deep to adipose tissue with a #15 scalpel blade.  The skin margins were undermined to an appropriate distance in all directions utilizing iris scissors. Double Island Pedicle Flap Text: The defect edges were debeveled with a #15 scalpel blade.  Given the location of the defect, shape of the defect and the proximity to free margins a double island pedicle advancement flap was deemed most appropriate.  Using a sterile surgical marker, an appropriate advancement flap was drawn incorporating the defect, outlining the appropriate donor tissue and placing the expected incisions within the relaxed skin tension lines where possible.    The area thus outlined was incised deep to adipose tissue with a #15 scalpel blade.  The skin margins were undermined to an appropriate distance in all directions around the primary defect and laterally outward around the island pedicle utilizing iris scissors.  There was minimal undermining beneath the pedicle flap. Paramedian Forehead Flap Text: A decision was made to reconstruct the defect utilizing an interpolation axial flap and a staged reconstruction.  A telfa template was made of the defect.  This telfa template was then used to outline the paramedian forehead pedicle flap.  The donor area for the pedicle flap was then injected with anesthesia.  The flap was excised through the skin and subcutaneous tissue down to the layer of the underlying musculature.  The pedicle flap was carefully excised within this deep plane to maintain its blood supply.  The edges of the donor site were undermined.   The donor site was closed in a primary fashion.  The pedicle was then rotated into position and sutured.  Once the tube was sutured into place, adequate blood supply was confirmed with blanching and refill.  The pedicle was then wrapped with xeroform gauze and dressed appropriately with a telfa and gauze bandage to ensure continued blood supply and protect the attached pedicle. Complex Repair And O-T Advancement Flap Text: The defect edges were debeveled with a #15 scalpel blade.  The primary defect was closed partially with a complex linear closure.  Given the location of the remaining defect, shape of the defect and the proximity to free margins an O-T advancement flap was deemed most appropriate for complete closure of the defect.  Using a sterile surgical marker, an appropriate advancement flap was drawn incorporating the defect and placing the expected incisions within the relaxed skin tension lines where possible.    The area thus outlined was incised deep to adipose tissue with a #15 scalpel blade.  The skin margins were undermined to an appropriate distance in all directions utilizing iris scissors. Graft Donor Site Bandage (Optional-Leave Blank If You Don't Want In Note): Steri-strips and a pressure bandage were applied to the donor site. Rhombic Flap Text: The defect edges were debeveled with a #15 scalpel blade.  Given the location of the defect and the proximity to free margins a rhombic flap was deemed most appropriate.  Using a sterile surgical marker, an appropriate rhombic flap was drawn incorporating the defect.    The area thus outlined was incised deep to adipose tissue with a #15 scalpel blade.  The skin margins were undermined to an appropriate distance in all directions utilizing iris scissors. Cheek Interpolation Flap Text: A decision was made to reconstruct the defect utilizing an interpolation axial flap and a staged reconstruction.  A telfa template was made of the defect.  This telfa template was then used to outline the Cheek Interpolation flap.  The donor area for the pedicle flap was then injected with anesthesia.  The flap was excised through the skin and subcutaneous tissue down to the layer of the underlying musculature.  The interpolation flap was carefully excised within this deep plane to maintain its blood supply.  The edges of the donor site were undermined.   The donor site was closed in a primary fashion.  The pedicle was then rotated into position and sutured.  Once the tube was sutured into place, adequate blood supply was confirmed with blanching and refill.  The pedicle was then wrapped with xeroform gauze and dressed appropriately with a telfa and gauze bandage to ensure continued blood supply and protect the attached pedicle. V-Y Flap Text: The defect edges were debeveled with a #15 scalpel blade.  Given the location of the defect, shape of the defect and the proximity to free margins a V-Y flap was deemed most appropriate.  Using a sterile surgical marker, an appropriate advancement flap was drawn incorporating the defect and placing the expected incisions within the relaxed skin tension lines where possible.    The area thus outlined was incised deep to adipose tissue with a #15 scalpel blade.  The skin margins were undermined to an appropriate distance in all directions utilizing iris scissors. Skin Substitute Text: The defect edges were debeveled with a #15 scalpel blade.  Given the location of the defect, shape of the defect and the proximity to free margins a skin substitute graft was deemed most appropriate.  The graft material was trimmed to fit the size of the defect. The graft was then placed in the primary defect and oriented appropriately. Modified Advancement Flap Text: The defect edges were debeveled with a #15 scalpel blade.  Given the location of the defect, shape of the defect and the proximity to free margins a modified advancement flap was deemed most appropriate.  Using a sterile surgical marker, an appropriate advancement flap was drawn incorporating the defect and placing the expected incisions within the relaxed skin tension lines where possible.    The area thus outlined was incised deep to adipose tissue with a #15 scalpel blade.  The skin margins were undermined to an appropriate distance in all directions utilizing iris scissors. Dressing: steri-strips and pressure dressing O-T Advancement Flap Text: The defect edges were debeveled with a #15 scalpel blade.  Given the location of the defect, shape of the defect and the proximity to free margins an O-T advancement flap was deemed most appropriate.  Using a sterile surgical marker, an appropriate advancement flap was drawn incorporating the defect and placing the expected incisions within the relaxed skin tension lines where possible.    The area thus outlined was incised deep to adipose tissue with a #15 scalpel blade.  The skin margins were undermined to an appropriate distance in all directions utilizing iris scissors. Crescentic Advancement Flap Text: The defect edges were debeveled with a #15 scalpel blade.  Given the location of the defect and the proximity to free margins a crescentic advancement flap was deemed most appropriate.  Using a sterile surgical marker, the appropriate advancement flap was drawn incorporating the defect and placing the expected incisions within the relaxed skin tension lines where possible.    The area thus outlined was incised deep to adipose tissue with a #15 scalpel blade.  The skin margins were undermined to an appropriate distance in all directions utilizing iris scissors. Epidermal Sutures: 4-0 Prolene Complex Repair And Single Advancement Flap Text: The defect edges were debeveled with a #15 scalpel blade.  The primary defect was closed partially with a complex linear closure.  Given the location of the remaining defect, shape of the defect and the proximity to free margins a single advancement flap was deemed most appropriate for complete closure of the defect.  Using a sterile surgical marker, an appropriate advancement flap was drawn incorporating the defect and placing the expected incisions within the relaxed skin tension lines where possible.    The area thus outlined was incised deep to adipose tissue with a #15 scalpel blade.  The skin margins were undermined to an appropriate distance in all directions utilizing iris scissors. Advancement Flap (Double) Text: The defect edges were debeveled with a #15 scalpel blade.  Given the location of the defect and the proximity to free margins a double advancement flap was deemed most appropriate.  Using a sterile surgical marker, the appropriate advancement flaps were drawn incorporating the defect and placing the expected incisions within the relaxed skin tension lines where possible.    The area thus outlined was incised deep to adipose tissue with a #15 scalpel blade.  The skin margins were undermined to an appropriate distance in all directions utilizing iris scissors. Purse String (Simple) Text: Given the location of the defect and the characteristics of the surrounding skin a purse string simple closure was deemed most appropriate.  Undermining was performed circumferentially around the surgical defect.  A purse string suture was then placed and tightened. Excision Method: Elliptical Complex Repair And W Plasty Text: The defect edges were debeveled with a #15 scalpel blade.  The primary defect was closed partially with a complex linear closure.  Given the location of the remaining defect, shape of the defect and the proximity to free margins a W plasty was deemed most appropriate for complete closure of the defect.  Using a sterile surgical marker, an appropriate advancement flap was drawn incorporating the defect and placing the expected incisions within the relaxed skin tension lines where possible.    The area thus outlined was incised deep to adipose tissue with a #15 scalpel blade.  The skin margins were undermined to an appropriate distance in all directions utilizing iris scissors. Size Of Margin In Cm: 0.3 No Repair - Repaired With Adjacent Surgical Defect Text (Leave Blank If You Do Not Want): After the excision the defect was repaired concurrently with another surgical defect which was in close approximation. Home Suture Removal Text: Patient was provided a home suture removal kit and will remove their sutures at home.  If they have any questions or difficulties they will call the office. H Plasty Text: Given the location of the defect, shape of the defect and the proximity to free margins a H-plasty was deemed most appropriate for repair.  Using a sterile surgical marker, the appropriate advancement arms of the H-plasty were drawn incorporating the defect and placing the expected incisions within the relaxed skin tension lines where possible. The area thus outlined was incised deep to adipose tissue with a #15 scalpel blade. The skin margins were undermined to an appropriate distance in all directions utilizing iris scissors.  The opposing advancement arms were then advanced into place in opposite direction and anchored with interrupted buried subcutaneous sutures. Bilateral Helical Rim Advancement Flap Text: The defect edges were debeveled with a #15 blade scalpel.  Given the location of the defect and the proximity to free margins (helical rim) a bilateral helical rim advancement flap was deemed most appropriate.  Using a sterile surgical marker, the appropriate advancement flaps were drawn incorporating the defect and placing the expected incisions between the helical rim and antihelix where possible.  The area thus outlined was incised through and through with a #15 scalpel blade.  With a skin hook and iris scissors, the flaps were gently and sharply undermined and freed up. Complex Repair And O-L Flap Text: The defect edges were debeveled with a #15 scalpel blade.  The primary defect was closed partially with a complex linear closure.  Given the location of the remaining defect, shape of the defect and the proximity to free margins an O-L flap was deemed most appropriate for complete closure of the defect.  Using a sterile surgical marker, an appropriate flap was drawn incorporating the defect and placing the expected incisions within the relaxed skin tension lines where possible.    The area thus outlined was incised deep to adipose tissue with a #15 scalpel blade.  The skin margins were undermined to an appropriate distance in all directions utilizing iris scissors. Advancement Flap (Single) Text: The defect edges were debeveled with a #15 scalpel blade.  Given the location of the defect and the proximity to free margins a single advancement flap was deemed most appropriate.  Using a sterile surgical marker, an appropriate advancement flap was drawn incorporating the defect and placing the expected incisions within the relaxed skin tension lines where possible.    The area thus outlined was incised deep to adipose tissue with a #15 scalpel blade.  The skin margins were undermined to an appropriate distance in all directions utilizing iris scissors. Path Notes (To The Dermatopathologist): Please check margins Complex Repair And Ftsg Text: The defect edges were debeveled with a #15 scalpel blade.  The primary defect was closed partially with a complex linear closure.  Given the location of the defect, shape of the defect and the proximity to free margins a full thickness skin graft was deemed most appropriate to repair the remaining defect.  The graft was trimmed to fit the size of the remaining defect.  The graft was then placed in the primary defect, oriented appropriately, and sutured into place. Melolabial Interpolation Flap Text: A decision was made to reconstruct the defect utilizing an interpolation axial flap and a staged reconstruction.  A telfa template was made of the defect.  This telfa template was then used to outline the melolabial interpolation flap.  The donor area for the pedicle flap was then injected with anesthesia.  The flap was excised through the skin and subcutaneous tissue down to the layer of the underlying musculature.  The pedicle flap was carefully excised within this deep plane to maintain its blood supply.  The edges of the donor site were undermined.   The donor site was closed in a primary fashion.  The pedicle was then rotated into position and sutured.  Once the tube was sutured into place, adequate blood supply was confirmed with blanching and refill.  The pedicle was then wrapped with xeroform gauze and dressed appropriately with a telfa and gauze bandage to ensure continued blood supply and protect the attached pedicle. Estimated Blood Loss (Cc): minimal Composite Graft Text: The defect edges were debeveled with a #15 scalpel blade.  Given the location of the defect, shape of the defect, the proximity to free margins and the fact the defect was full thickness a composite graft was deemed most appropriate.  The defect was outline and then transferred to the donor site.  A full thickness graft was then excised from the donor site. The graft was then placed in the primary defect, oriented appropriately and then sutured into place.  The secondary defect was then repaired using a primary closure. Epidermal Closure: running Cartilage Graft Text: The defect edges were debeveled with a #15 scalpel blade.  Given the location of the defect, shape of the defect, the fact the defect involved a full thickness cartilage defect a cartilage graft was deemed most appropriate.  An appropriate donor site was identified, cleansed, and anesthetized. The cartilage graft was then harvested and transferred to the recipient site, oriented appropriately and then sutured into place.  The secondary defect was then repaired using a primary closure. Complex Repair And A-T Advancement Flap Text: The defect edges were debeveled with a #15 scalpel blade.  The primary defect was closed partially with a complex linear closure.  Given the location of the remaining defect, shape of the defect and the proximity to free margins an A-T advancement flap was deemed most appropriate for complete closure of the defect.  Using a sterile surgical marker, an appropriate advancement flap was drawn incorporating the defect and placing the expected incisions within the relaxed skin tension lines where possible.    The area thus outlined was incised deep to adipose tissue with a #15 scalpel blade.  The skin margins were undermined to an appropriate distance in all directions utilizing iris scissors. Medical Necessity Information: It is in your best interest to select a reason for this procedure from the list below. All of these items fulfill various CMS LCD requirements except lesion extends to a margin. Medical Necessity Clause: This procedure was medically necessary because the lesion that was treated was:

## 2018-03-12 ENCOUNTER — HOSPITAL ENCOUNTER (OUTPATIENT)
Dept: HOSPITAL 84 - D.LABREF | Age: 53
Discharge: HOME | End: 2018-03-12
Attending: INTERNAL MEDICINE
Payer: COMMERCIAL

## 2018-03-12 VITALS — BODY MASS INDEX: 29.9 KG/M2

## 2018-03-12 DIAGNOSIS — E78.5: Primary | ICD-10-CM

## 2018-03-12 LAB
ALT SERPL-CCNC: 13 U/L (ref 10–68)
CHOLEST/HDLC SERPL: 3 RATIO (ref 2.3–4.1)
CK SERPL-CCNC: 33 UL (ref 21–215)
HDLC SERPL-MCNC: 71 MG/DL (ref 32–96)
LDL-HDL RATIO: 1.7 RATIO (ref 1.5–3.5)
LDLC SERPL-MCNC: 119 MG/DL (ref 0–100)
TRIGL SERPL-MCNC: 102 MG/DL (ref 30–200)

## 2018-03-13 ENCOUNTER — HOSPITAL ENCOUNTER (OUTPATIENT)
Dept: HOSPITAL 84 - D.ECHO | Age: 53
Discharge: HOME | End: 2018-03-13
Attending: INTERNAL MEDICINE
Payer: COMMERCIAL

## 2018-03-13 VITALS — BODY MASS INDEX: 29.9 KG/M2

## 2018-03-13 DIAGNOSIS — I25.10: Primary | ICD-10-CM

## 2018-04-07 NOTE — NUR
SUPPOSITORY ADMINISTERED TO HELP ALEVIATE SOME ABDOMINAL DISCOMFORT. LAYING ON
LEFT SIDE WITH PILLOW PROPPED BEHIND BACK. no abdominal pain/no diarrhea/no constipation/no nausea/no vomiting/no melena

## 2018-04-13 ENCOUNTER — HOSPITAL ENCOUNTER (OUTPATIENT)
Dept: HOSPITAL 84 - D.LAB | Age: 53
Discharge: HOME | End: 2018-04-13
Attending: INTERNAL MEDICINE
Payer: COMMERCIAL

## 2018-04-13 VITALS — BODY MASS INDEX: 29.9 KG/M2

## 2018-04-13 DIAGNOSIS — R13.10: ICD-10-CM

## 2018-04-13 DIAGNOSIS — D64.9: Primary | ICD-10-CM

## 2018-04-13 DIAGNOSIS — R12: ICD-10-CM

## 2018-04-13 DIAGNOSIS — R11.0: ICD-10-CM

## 2018-04-13 LAB
BASOPHILS NFR BLD AUTO: 0.3 % (ref 0–2)
EOSINOPHIL NFR BLD: 1 % (ref 0–7)
ERYTHROCYTE [DISTWIDTH] IN BLOOD BY AUTOMATED COUNT: 13.4 % (ref 11.5–14.5)
HCT VFR BLD CALC: 33.3 % (ref 36–48)
HGB BLD-MCNC: 11 G/DL (ref 12–16)
IMM GRANULOCYTES NFR BLD: 0.3 % (ref 0–5)
LYMPHOCYTES NFR BLD AUTO: 26.3 % (ref 15–50)
MCH RBC QN AUTO: 32.8 PG (ref 26–34)
MCHC RBC AUTO-ENTMCNC: 33 G/DL (ref 31–37)
MCV RBC: 99.4 FL (ref 80–100)
MONOCYTES NFR BLD: 10 % (ref 2–11)
NEUTROPHILS NFR BLD AUTO: 62.1 % (ref 40–80)
PLATELET # BLD: 342 10X3/UL (ref 130–400)
PMV BLD AUTO: 10 FL (ref 7.4–10.4)
RBC # BLD AUTO: 3.35 10X6/UL (ref 4–5.4)
WBC # BLD AUTO: 4 10X3/UL (ref 4.8–10.8)

## 2018-05-02 ENCOUNTER — HOSPITAL ENCOUNTER (EMERGENCY)
Dept: HOSPITAL 84 - D.ER | Age: 53
Discharge: HOME | End: 2018-05-02
Payer: COMMERCIAL

## 2018-05-02 VITALS — BODY MASS INDEX: 29.9 KG/M2

## 2018-05-02 DIAGNOSIS — E03.9: ICD-10-CM

## 2018-05-02 DIAGNOSIS — M32.9: ICD-10-CM

## 2018-05-02 DIAGNOSIS — J45.909: ICD-10-CM

## 2018-05-02 DIAGNOSIS — I10: ICD-10-CM

## 2018-05-02 DIAGNOSIS — R00.0: ICD-10-CM

## 2018-05-02 DIAGNOSIS — K52.9: Primary | ICD-10-CM

## 2018-05-02 DIAGNOSIS — K21.9: ICD-10-CM

## 2018-05-02 DIAGNOSIS — R10.9: ICD-10-CM

## 2018-05-02 DIAGNOSIS — I45.10: ICD-10-CM

## 2018-05-02 LAB
ALBUMIN SERPL-MCNC: 2.8 G/DL (ref 3.4–5)
ALP SERPL-CCNC: 90 U/L (ref 46–116)
ALT SERPL-CCNC: 19 U/L (ref 10–68)
ANION GAP SERPL CALC-SCNC: 12.3 MMOL/L (ref 8–16)
APPEARANCE UR: (no result)
BACTERIA #/AREA URNS HPF: (no result) /HPF
BASOPHILS NFR BLD AUTO: 0 % (ref 0–2)
BILIRUB SERPL-MCNC: 0.29 MG/DL (ref 0.2–1.3)
BILIRUB SERPL-MCNC: NEGATIVE MG/DL
BUN SERPL-MCNC: 13 MG/DL (ref 7–18)
CALCIUM SERPL-MCNC: 9.1 MG/DL (ref 8.5–10.1)
CHLORIDE SERPL-SCNC: 103 MMOL/L (ref 98–107)
CK SERPL-CCNC: 75 UL (ref 21–215)
CO2 SERPL-SCNC: 24.8 MMOL/L (ref 21–32)
COLOR UR: YELLOW
CREAT SERPL-MCNC: 0.6 MG/DL (ref 0.6–1.3)
EOSINOPHIL NFR BLD: 1.4 % (ref 0–7)
ERYTHROCYTE [DISTWIDTH] IN BLOOD BY AUTOMATED COUNT: 12.4 % (ref 11.5–14.5)
GLOBULIN SER-MCNC: 4.5 G/L
GLUCOSE SERPL-MCNC: 100 MG/DL
GLUCOSE SERPL-MCNC: 254 MG/DL (ref 74–106)
HCG SERPL-ACNC: NEGATIVE M[IU]/ML
HCT VFR BLD CALC: 33.8 % (ref 36–48)
HGB BLD-MCNC: 11.1 G/DL (ref 12–16)
IMM GRANULOCYTES NFR BLD: 0.2 % (ref 0–5)
KETONES UR STRIP-MCNC: NEGATIVE MG/DL
LYMPHOCYTES NFR BLD AUTO: 24.4 % (ref 15–50)
MCH RBC QN AUTO: 32.1 PG (ref 26–34)
MCHC RBC AUTO-ENTMCNC: 32.8 G/DL (ref 31–37)
MCV RBC: 97.7 FL (ref 80–100)
MONOCYTES NFR BLD: 10.7 % (ref 2–11)
MUCOUS THREADS #/AREA URNS LPF: (no result) /LPF
NEUTROPHILS NFR BLD AUTO: 63.3 % (ref 40–80)
NITRITE UR-MCNC: NEGATIVE MG/ML
OSMOLALITY SERPL CALC.SUM OF ELEC: 278 MOSM/KG (ref 275–300)
PH UR STRIP: 5 [PH] (ref 5–6)
PLATELET # BLD: 289 10X3/UL (ref 130–400)
PMV BLD AUTO: 10 FL (ref 7.4–10.4)
POTASSIUM SERPL-SCNC: 5.1 MMOL/L (ref 3.5–5.1)
PROT SERPL-MCNC: 7.3 G/DL (ref 6.4–8.2)
PROT UR-MCNC: NEGATIVE MG/DL
RBC # BLD AUTO: 3.46 10X6/UL (ref 4–5.4)
RBC #/AREA URNS HPF: (no result) /HPF (ref 0–5)
SODIUM SERPL-SCNC: 135 MMOL/L (ref 136–145)
SP GR UR STRIP: 1.02 (ref 1–1.02)
SQUAMOUS #/AREA URNS HPF: (no result) /HPF (ref 0–5)
TROPONIN I SERPL-MCNC: < 0.017 NG/ML (ref 0–0.06)
UROBILINOGEN UR-MCNC: NORMAL MG/DL
WBC # BLD AUTO: 4.4 10X3/UL (ref 4.8–10.8)
WBC #/AREA URNS HPF: (no result) /HPF (ref 0–5)

## 2018-05-08 ENCOUNTER — HOSPITAL ENCOUNTER (OUTPATIENT)
Dept: HOSPITAL 84 - D.LABREF | Age: 53
Discharge: HOME | End: 2018-05-08
Attending: INTERNAL MEDICINE
Payer: COMMERCIAL

## 2018-05-08 VITALS — BODY MASS INDEX: 29.9 KG/M2

## 2018-05-08 DIAGNOSIS — I25.10: Primary | ICD-10-CM

## 2018-05-08 DIAGNOSIS — E78.2: ICD-10-CM

## 2018-05-08 LAB
ALT SERPL-CCNC: 16 U/L (ref 10–68)
CHOLEST/HDLC SERPL: 3.9 RATIO (ref 2.3–4.1)
CK SERPL-CCNC: 52 UL (ref 21–215)
HDLC SERPL-MCNC: 53 MG/DL (ref 32–96)
LDL-HDL RATIO: 2.3 RATIO (ref 1.5–3.5)
LDLC SERPL-MCNC: 123 MG/DL (ref 0–100)
TRIGL SERPL-MCNC: 150 MG/DL (ref 30–200)

## 2018-06-21 ENCOUNTER — HOSPITAL ENCOUNTER (OUTPATIENT)
Dept: HOSPITAL 84 - D.LABREF | Age: 53
Discharge: HOME | End: 2018-06-21
Attending: INTERNAL MEDICINE
Payer: COMMERCIAL

## 2018-06-21 VITALS — BODY MASS INDEX: 29.9 KG/M2

## 2018-06-21 DIAGNOSIS — E78.5: Primary | ICD-10-CM

## 2018-06-21 LAB
ALT SERPL-CCNC: 126 U/L (ref 10–68)
CHOLEST/HDLC SERPL: 2.5 RATIO (ref 2.3–4.1)
CK SERPL-CCNC: 87 UL (ref 21–215)
HDLC SERPL-MCNC: 58 MG/DL (ref 32–96)
LDL-HDL RATIO: 1.2 RATIO (ref 1.5–3.5)
LDLC SERPL-MCNC: 72 MG/DL (ref 0–100)
TRIGL SERPL-MCNC: 62 MG/DL (ref 30–200)

## 2018-07-02 ENCOUNTER — HOSPITAL ENCOUNTER (OUTPATIENT)
Dept: HOSPITAL 84 - D.LABREF | Age: 53
Discharge: HOME | End: 2018-07-02
Attending: INTERNAL MEDICINE
Payer: COMMERCIAL

## 2018-07-02 ENCOUNTER — HOSPITAL ENCOUNTER (OUTPATIENT)
Dept: HOSPITAL 84 - D.ECHO | Age: 53
Discharge: HOME | End: 2018-07-02
Attending: INTERNAL MEDICINE
Payer: COMMERCIAL

## 2018-07-02 VITALS — BODY MASS INDEX: 29.9 KG/M2

## 2018-07-02 DIAGNOSIS — R07.1: ICD-10-CM

## 2018-07-02 DIAGNOSIS — I25.10: Primary | ICD-10-CM

## 2018-07-02 DIAGNOSIS — Z95.1: ICD-10-CM

## 2018-07-02 DIAGNOSIS — R06.00: ICD-10-CM

## 2018-07-02 DIAGNOSIS — I25.810: Primary | ICD-10-CM

## 2018-07-30 ENCOUNTER — HOSPITAL ENCOUNTER (OUTPATIENT)
Dept: HOSPITAL 84 - D.LABREF | Age: 53
Discharge: HOME | End: 2018-07-30
Attending: INTERNAL MEDICINE
Payer: COMMERCIAL

## 2018-07-30 VITALS — BODY MASS INDEX: 29.9 KG/M2

## 2018-07-30 DIAGNOSIS — I25.10: Primary | ICD-10-CM

## 2018-07-30 LAB
ANION GAP SERPL CALC-SCNC: 8.9 MMOL/L (ref 8–16)
BUN SERPL-MCNC: 14 MG/DL (ref 7–18)
CALCIUM SERPL-MCNC: 9 MG/DL (ref 8.5–10.1)
CHLORIDE SERPL-SCNC: 103 MMOL/L (ref 98–107)
CO2 SERPL-SCNC: 30.6 MMOL/L (ref 21–32)
CREAT SERPL-MCNC: 0.7 MG/DL (ref 0.6–1.3)
GLUCOSE SERPL-MCNC: 248 MG/DL (ref 74–106)
OSMOLALITY SERPL CALC.SUM OF ELEC: 284 MOSM/KG (ref 275–300)
POTASSIUM SERPL-SCNC: 4.5 MMOL/L (ref 3.5–5.1)
SODIUM SERPL-SCNC: 138 MMOL/L (ref 136–145)

## 2018-08-17 ENCOUNTER — HOSPITAL ENCOUNTER (OUTPATIENT)
Dept: HOSPITAL 84 - D.CATH | Age: 53
Discharge: HOME | End: 2018-08-17
Attending: INTERNAL MEDICINE
Payer: COMMERCIAL

## 2018-08-17 VITALS — HEIGHT: 65 IN | WEIGHT: 170.36 LBS | BODY MASS INDEX: 28.38 KG/M2

## 2018-08-17 VITALS — DIASTOLIC BLOOD PRESSURE: 86 MMHG | SYSTOLIC BLOOD PRESSURE: 143 MMHG

## 2018-08-17 DIAGNOSIS — Z01.812: ICD-10-CM

## 2018-08-17 DIAGNOSIS — I25.719: ICD-10-CM

## 2018-08-17 DIAGNOSIS — I25.119: Primary | ICD-10-CM

## 2018-08-17 LAB
ANION GAP SERPL CALC-SCNC: 10.4 MMOL/L (ref 8–16)
BASOPHILS NFR BLD AUTO: 0.2 % (ref 0–2)
BUN SERPL-MCNC: 13 MG/DL (ref 7–18)
CALCIUM SERPL-MCNC: 9.1 MG/DL (ref 8.5–10.1)
CHLORIDE SERPL-SCNC: 105 MMOL/L (ref 98–107)
CO2 SERPL-SCNC: 29.7 MMOL/L (ref 21–32)
CREAT SERPL-MCNC: 0.8 MG/DL (ref 0.6–1.3)
EOSINOPHIL NFR BLD: 1.6 % (ref 0–7)
ERYTHROCYTE [DISTWIDTH] IN BLOOD BY AUTOMATED COUNT: 12.7 % (ref 11.5–14.5)
GLUCOSE SERPL-MCNC: 130 MG/DL (ref 74–106)
HCT VFR BLD CALC: 36.5 % (ref 36–48)
HGB BLD-MCNC: 12.2 G/DL (ref 12–16)
IMM GRANULOCYTES NFR BLD: 0 % (ref 0–5)
LYMPHOCYTES NFR BLD AUTO: 29.5 % (ref 15–50)
MCH RBC QN AUTO: 30 PG (ref 26–34)
MCHC RBC AUTO-ENTMCNC: 33.4 G/DL (ref 31–37)
MCV RBC: 89.9 FL (ref 80–100)
MONOCYTES NFR BLD: 9.1 % (ref 2–11)
NEUTROPHILS NFR BLD AUTO: 59.6 % (ref 40–80)
OSMOLALITY SERPL CALC.SUM OF ELEC: 282 MOSM/KG (ref 275–300)
PLATELET # BLD: 218 10X3/UL (ref 130–400)
PMV BLD AUTO: 10.3 FL (ref 7.4–10.4)
POTASSIUM SERPL-SCNC: 4.1 MMOL/L (ref 3.5–5.1)
RBC # BLD AUTO: 4.06 10X6/UL (ref 4–5.4)
SODIUM SERPL-SCNC: 141 MMOL/L (ref 136–145)
WBC # BLD AUTO: 5 10X3/UL (ref 4.8–10.8)

## 2018-08-22 ENCOUNTER — HOSPITAL ENCOUNTER (OUTPATIENT)
Dept: HOSPITAL 84 - D.LAB | Age: 53
Discharge: HOME | End: 2018-08-22
Attending: INTERNAL MEDICINE
Payer: COMMERCIAL

## 2018-08-22 VITALS — BODY MASS INDEX: 28.3 KG/M2

## 2018-08-22 DIAGNOSIS — K52.9: Primary | ICD-10-CM

## 2018-08-28 ENCOUNTER — HOSPITAL ENCOUNTER (INPATIENT)
Dept: HOSPITAL 84 - D.ER | Age: 53
LOS: 5 days | Discharge: HOME | DRG: 386 | End: 2018-09-02
Attending: FAMILY MEDICINE | Admitting: FAMILY MEDICINE
Payer: COMMERCIAL

## 2018-08-28 VITALS
HEIGHT: 67 IN | BODY MASS INDEX: 28 KG/M2 | HEIGHT: 67 IN | BODY MASS INDEX: 28 KG/M2 | WEIGHT: 178.37 LBS | WEIGHT: 178.37 LBS | BODY MASS INDEX: 28 KG/M2

## 2018-08-28 VITALS — SYSTOLIC BLOOD PRESSURE: 149 MMHG | DIASTOLIC BLOOD PRESSURE: 77 MMHG

## 2018-08-28 VITALS — SYSTOLIC BLOOD PRESSURE: 157 MMHG | DIASTOLIC BLOOD PRESSURE: 72 MMHG

## 2018-08-28 VITALS — DIASTOLIC BLOOD PRESSURE: 75 MMHG | SYSTOLIC BLOOD PRESSURE: 150 MMHG

## 2018-08-28 DIAGNOSIS — Z79.4: ICD-10-CM

## 2018-08-28 DIAGNOSIS — I25.10: ICD-10-CM

## 2018-08-28 DIAGNOSIS — N12: ICD-10-CM

## 2018-08-28 DIAGNOSIS — K50.90: Primary | ICD-10-CM

## 2018-08-28 DIAGNOSIS — M54.9: ICD-10-CM

## 2018-08-28 DIAGNOSIS — E11.9: ICD-10-CM

## 2018-08-28 DIAGNOSIS — E78.5: ICD-10-CM

## 2018-08-28 DIAGNOSIS — M32.9: ICD-10-CM

## 2018-08-28 DIAGNOSIS — E03.9: ICD-10-CM

## 2018-08-28 DIAGNOSIS — I10: ICD-10-CM

## 2018-08-28 DIAGNOSIS — K21.9: ICD-10-CM

## 2018-08-28 DIAGNOSIS — Z95.1: ICD-10-CM

## 2018-08-28 DIAGNOSIS — K57.92: ICD-10-CM

## 2018-08-28 LAB
ALBUMIN SERPL-MCNC: 3 G/DL (ref 3.4–5)
ALP SERPL-CCNC: 79 U/L (ref 46–116)
ALT SERPL-CCNC: 32 U/L (ref 10–68)
AMYLASE SERPL-CCNC: 33 U/L (ref 25–115)
ANION GAP SERPL CALC-SCNC: 9.3 MMOL/L (ref 8–16)
APPEARANCE UR: CLEAR
BACTERIA #/AREA URNS HPF: (no result) /HPF
BASOPHILS NFR BLD AUTO: 0.2 % (ref 0–2)
BILIRUB SERPL-MCNC: 0.26 MG/DL (ref 0.2–1.3)
BILIRUB SERPL-MCNC: NEGATIVE MG/DL
BUN SERPL-MCNC: 13 MG/DL (ref 7–18)
CALCIUM SERPL-MCNC: 8.3 MG/DL (ref 8.5–10.1)
CHLORIDE SERPL-SCNC: 104 MMOL/L (ref 98–107)
CK MB SERPL-MCNC: 1.6 U/L (ref 0–3.6)
CO2 SERPL-SCNC: 26.8 MMOL/L (ref 21–32)
COLOR UR: (no result)
CREAT SERPL-MCNC: 0.9 MG/DL (ref 0.6–1.3)
CRP SERPL-MCNC: 0.2 MG/DL (ref 0–0.9)
D DIMER PPP FEU-MCNC: 0.61 UG/MLFEU (ref 0.2–0.54)
EOSINOPHIL NFR BLD: 1.3 % (ref 0–7)
ERYTHROCYTE [DISTWIDTH] IN BLOOD BY AUTOMATED COUNT: 13.3 % (ref 11.5–14.5)
GLOBULIN SER-MCNC: 3.6 G/L
GLUCOSE SERPL-MCNC: 100 MG/DL
GLUCOSE SERPL-MCNC: 218 MG/DL (ref 74–106)
HCT VFR BLD CALC: 37.6 % (ref 36–48)
HGB BLD-MCNC: 12.7 G/DL (ref 12–16)
IMM GRANULOCYTES NFR BLD: 0 % (ref 0–5)
INR PPP: 0.95 (ref 0.85–1.17)
KETONES UR STRIP-MCNC: NEGATIVE MG/DL
LIPASE SERPL-CCNC: 79 U/L (ref 73–393)
LYMPHOCYTES NFR BLD AUTO: 29.4 % (ref 15–50)
MCH RBC QN AUTO: 30.3 PG (ref 26–34)
MCHC RBC AUTO-ENTMCNC: 33.8 G/DL (ref 31–37)
MCV RBC: 89.7 FL (ref 80–100)
MONOCYTES NFR BLD: 8.1 % (ref 2–11)
NEUTROPHILS NFR BLD AUTO: 61 % (ref 40–80)
NITRITE UR-MCNC: NEGATIVE MG/ML
OSMOLALITY SERPL CALC.SUM OF ELEC: 278 MOSM/KG (ref 275–300)
PH UR STRIP: 5 [PH] (ref 5–6)
PLATELET # BLD: 229 10X3/UL (ref 130–400)
PMV BLD AUTO: 11.4 FL (ref 7.4–10.4)
POTASSIUM SERPL-SCNC: 4.1 MMOL/L (ref 3.5–5.1)
PROT SERPL-MCNC: 6.6 G/DL (ref 6.4–8.2)
PROT UR-MCNC: NEGATIVE MG/DL
PROTHROMBIN TIME: 12.3 SECONDS (ref 11.6–15)
RBC # BLD AUTO: 4.19 10X6/UL (ref 4–5.4)
RBC #/AREA URNS HPF: (no result) /HPF (ref 0–5)
SODIUM SERPL-SCNC: 136 MMOL/L (ref 136–145)
SP GR UR STRIP: 1.01 (ref 1–1.02)
SQUAMOUS #/AREA URNS HPF: (no result) /HPF (ref 0–5)
TROPONIN I SERPL-MCNC: < 0.017 NG/ML (ref 0–0.06)
UROBILINOGEN UR-MCNC: NORMAL MG/DL
WBC # BLD AUTO: 5.3 10X3/UL (ref 4.8–10.8)
WBC #/AREA URNS HPF: (no result) /HPF (ref 0–5)

## 2018-08-29 VITALS — DIASTOLIC BLOOD PRESSURE: 58 MMHG | SYSTOLIC BLOOD PRESSURE: 94 MMHG

## 2018-08-29 VITALS — DIASTOLIC BLOOD PRESSURE: 66 MMHG | SYSTOLIC BLOOD PRESSURE: 124 MMHG

## 2018-08-29 VITALS — DIASTOLIC BLOOD PRESSURE: 70 MMHG | SYSTOLIC BLOOD PRESSURE: 124 MMHG

## 2018-08-29 VITALS — SYSTOLIC BLOOD PRESSURE: 169 MMHG | DIASTOLIC BLOOD PRESSURE: 82 MMHG

## 2018-08-29 LAB
ALBUMIN SERPL-MCNC: 2.8 G/DL (ref 3.4–5)
ALP SERPL-CCNC: 114 U/L (ref 46–116)
ALT SERPL-CCNC: 156 U/L (ref 10–68)
ANION GAP SERPL CALC-SCNC: 8.9 MMOL/L (ref 8–16)
BASOPHILS NFR BLD AUTO: 0 % (ref 0–2)
BILIRUB SERPL-MCNC: 0.35 MG/DL (ref 0.2–1.3)
BUN SERPL-MCNC: 10 MG/DL (ref 7–18)
CALCIUM SERPL-MCNC: 8.4 MG/DL (ref 8.5–10.1)
CHLORIDE SERPL-SCNC: 105 MMOL/L (ref 98–107)
CO2 SERPL-SCNC: 29.2 MMOL/L (ref 21–32)
CREAT SERPL-MCNC: 0.7 MG/DL (ref 0.6–1.3)
EOSINOPHIL NFR BLD: 0.5 % (ref 0–7)
ERYTHROCYTE [DISTWIDTH] IN BLOOD BY AUTOMATED COUNT: 13.3 % (ref 11.5–14.5)
GLOBULIN SER-MCNC: 3.6 G/L
GLUCOSE SERPL-MCNC: 186 MG/DL (ref 74–106)
HCT VFR BLD CALC: 34.6 % (ref 36–48)
HGB BLD-MCNC: 11.6 G/DL (ref 12–16)
IMM GRANULOCYTES NFR BLD: 0 % (ref 0–5)
LYMPHOCYTES NFR BLD AUTO: 24.6 % (ref 15–50)
MCH RBC QN AUTO: 30.2 PG (ref 26–34)
MCHC RBC AUTO-ENTMCNC: 33.5 G/DL (ref 31–37)
MCV RBC: 90.1 FL (ref 80–100)
MONOCYTES NFR BLD: 6.9 % (ref 2–11)
NEUTROPHILS NFR BLD AUTO: 68 % (ref 40–80)
OSMOLALITY SERPL CALC.SUM OF ELEC: 281 MOSM/KG (ref 275–300)
PLATELET # BLD: 212 10X3/UL (ref 130–400)
PMV BLD AUTO: 11 FL (ref 7.4–10.4)
POTASSIUM SERPL-SCNC: 4.1 MMOL/L (ref 3.5–5.1)
PROT SERPL-MCNC: 6.4 G/DL (ref 6.4–8.2)
RBC # BLD AUTO: 3.84 10X6/UL (ref 4–5.4)
SODIUM SERPL-SCNC: 139 MMOL/L (ref 136–145)
WBC # BLD AUTO: 4.2 10X3/UL (ref 4.8–10.8)

## 2018-08-30 VITALS — SYSTOLIC BLOOD PRESSURE: 164 MMHG | DIASTOLIC BLOOD PRESSURE: 83 MMHG

## 2018-08-30 VITALS — SYSTOLIC BLOOD PRESSURE: 155 MMHG | DIASTOLIC BLOOD PRESSURE: 70 MMHG

## 2018-08-30 VITALS — DIASTOLIC BLOOD PRESSURE: 80 MMHG | SYSTOLIC BLOOD PRESSURE: 161 MMHG

## 2018-08-30 VITALS — SYSTOLIC BLOOD PRESSURE: 135 MMHG | DIASTOLIC BLOOD PRESSURE: 65 MMHG

## 2018-08-30 VITALS — DIASTOLIC BLOOD PRESSURE: 72 MMHG | SYSTOLIC BLOOD PRESSURE: 148 MMHG

## 2018-08-30 LAB
ALBUMIN SERPL-MCNC: 2.8 G/DL (ref 3.4–5)
ALP SERPL-CCNC: 99 U/L (ref 46–116)
ALT SERPL-CCNC: 105 U/L (ref 10–68)
ANION GAP SERPL CALC-SCNC: 10.9 MMOL/L (ref 8–16)
BASOPHILS NFR BLD AUTO: 0 % (ref 0–2)
BILIRUB SERPL-MCNC: 0.27 MG/DL (ref 0.2–1.3)
BUN SERPL-MCNC: 7 MG/DL (ref 7–18)
CALCIUM SERPL-MCNC: 8.4 MG/DL (ref 8.5–10.1)
CHLORIDE SERPL-SCNC: 105 MMOL/L (ref 98–107)
CO2 SERPL-SCNC: 27.7 MMOL/L (ref 21–32)
CREAT SERPL-MCNC: 0.7 MG/DL (ref 0.6–1.3)
EOSINOPHIL NFR BLD: 1.4 % (ref 0–7)
ERYTHROCYTE [DISTWIDTH] IN BLOOD BY AUTOMATED COUNT: 13.2 % (ref 11.5–14.5)
GLOBULIN SER-MCNC: 3.5 G/L
GLUCOSE SERPL-MCNC: 154 MG/DL (ref 74–106)
HCT VFR BLD CALC: 32.8 % (ref 36–48)
HGB BLD-MCNC: 11 G/DL (ref 12–16)
IMM GRANULOCYTES NFR BLD: 0 % (ref 0–5)
LYMPHOCYTES NFR BLD AUTO: 29 % (ref 15–50)
MAGNESIUM SERPL-MCNC: 1.4 MG/DL (ref 1.8–2.4)
MCH RBC QN AUTO: 30.3 PG (ref 26–34)
MCHC RBC AUTO-ENTMCNC: 33.5 G/DL (ref 31–37)
MCV RBC: 90.4 FL (ref 80–100)
MONOCYTES NFR BLD: 8.6 % (ref 2–11)
NEUTROPHILS NFR BLD AUTO: 61 % (ref 40–80)
OSMOLALITY SERPL CALC.SUM OF ELEC: 279 MOSM/KG (ref 275–300)
PHOSPHATE SERPL-MCNC: 4.2 MG/DL (ref 2.5–4.9)
PLATELET # BLD: 207 10X3/UL (ref 130–400)
PMV BLD AUTO: 10.9 FL (ref 7.4–10.4)
POTASSIUM SERPL-SCNC: 3.6 MMOL/L (ref 3.5–5.1)
PROT SERPL-MCNC: 6.3 G/DL (ref 6.4–8.2)
RBC # BLD AUTO: 3.63 10X6/UL (ref 4–5.4)
SODIUM SERPL-SCNC: 140 MMOL/L (ref 136–145)
WBC # BLD AUTO: 4.4 10X3/UL (ref 4.8–10.8)

## 2018-08-31 VITALS — DIASTOLIC BLOOD PRESSURE: 70 MMHG | SYSTOLIC BLOOD PRESSURE: 148 MMHG

## 2018-08-31 VITALS — DIASTOLIC BLOOD PRESSURE: 88 MMHG | SYSTOLIC BLOOD PRESSURE: 121 MMHG

## 2018-08-31 VITALS — SYSTOLIC BLOOD PRESSURE: 106 MMHG | DIASTOLIC BLOOD PRESSURE: 76 MMHG

## 2018-08-31 VITALS — DIASTOLIC BLOOD PRESSURE: 86 MMHG | SYSTOLIC BLOOD PRESSURE: 173 MMHG

## 2018-08-31 LAB
ALBUMIN SERPL-MCNC: 2.7 G/DL (ref 3.4–5)
ALP SERPL-CCNC: 92 U/L (ref 46–116)
ALT SERPL-CCNC: 71 U/L (ref 10–68)
ANION GAP SERPL CALC-SCNC: 8 MMOL/L (ref 8–16)
BASOPHILS NFR BLD AUTO: 0 % (ref 0–2)
BILIRUB SERPL-MCNC: 0.19 MG/DL (ref 0.2–1.3)
BUN SERPL-MCNC: 9 MG/DL (ref 7–18)
CALCIUM SERPL-MCNC: 8.4 MG/DL (ref 8.5–10.1)
CHLORIDE SERPL-SCNC: 104 MMOL/L (ref 98–107)
CO2 SERPL-SCNC: 30.3 MMOL/L (ref 21–32)
CREAT SERPL-MCNC: 0.8 MG/DL (ref 0.6–1.3)
EOSINOPHIL NFR BLD: 3.7 % (ref 0–7)
ERYTHROCYTE [DISTWIDTH] IN BLOOD BY AUTOMATED COUNT: 13 % (ref 11.5–14.5)
GLOBULIN SER-MCNC: 3.6 G/L
GLUCOSE SERPL-MCNC: 144 MG/DL (ref 74–106)
HCT VFR BLD CALC: 32.6 % (ref 36–48)
HGB BLD-MCNC: 10.7 G/DL (ref 12–16)
IMM GRANULOCYTES NFR BLD: 0.2 % (ref 0–5)
LYMPHOCYTES NFR BLD AUTO: 34.7 % (ref 15–50)
MAGNESIUM SERPL-MCNC: 1.6 MG/DL (ref 1.8–2.4)
MCH RBC QN AUTO: 29.7 PG (ref 26–34)
MCHC RBC AUTO-ENTMCNC: 32.8 G/DL (ref 31–37)
MCV RBC: 90.6 FL (ref 80–100)
MONOCYTES NFR BLD: 7.9 % (ref 2–11)
NEUTROPHILS NFR BLD AUTO: 53.5 % (ref 40–80)
OSMOLALITY SERPL CALC.SUM OF ELEC: 279 MOSM/KG (ref 275–300)
PHOSPHATE SERPL-MCNC: 4 MG/DL (ref 2.5–4.9)
PLATELET # BLD: 195 10X3/UL (ref 130–400)
PMV BLD AUTO: 10.8 FL (ref 7.4–10.4)
POTASSIUM SERPL-SCNC: 3.3 MMOL/L (ref 3.5–5.1)
PROT SERPL-MCNC: 6.3 G/DL (ref 6.4–8.2)
RBC # BLD AUTO: 3.6 10X6/UL (ref 4–5.4)
SODIUM SERPL-SCNC: 139 MMOL/L (ref 136–145)
WBC # BLD AUTO: 4.6 10X3/UL (ref 4.8–10.8)

## 2018-09-01 VITALS — SYSTOLIC BLOOD PRESSURE: 117 MMHG | DIASTOLIC BLOOD PRESSURE: 73 MMHG

## 2018-09-01 VITALS — SYSTOLIC BLOOD PRESSURE: 171 MMHG | DIASTOLIC BLOOD PRESSURE: 89 MMHG

## 2018-09-01 VITALS — SYSTOLIC BLOOD PRESSURE: 165 MMHG | DIASTOLIC BLOOD PRESSURE: 75 MMHG

## 2018-09-01 VITALS — SYSTOLIC BLOOD PRESSURE: 160 MMHG | DIASTOLIC BLOOD PRESSURE: 70 MMHG

## 2018-09-01 VITALS — SYSTOLIC BLOOD PRESSURE: 152 MMHG | DIASTOLIC BLOOD PRESSURE: 91 MMHG

## 2018-09-01 LAB
ALBUMIN SERPL-MCNC: 2.8 G/DL (ref 3.4–5)
ALP SERPL-CCNC: 97 U/L (ref 46–116)
ALT SERPL-CCNC: 55 U/L (ref 10–68)
ANION GAP SERPL CALC-SCNC: 7.7 MMOL/L (ref 8–16)
BASOPHILS NFR BLD AUTO: 0 % (ref 0–2)
BILIRUB SERPL-MCNC: 0.17 MG/DL (ref 0.2–1.3)
BUN SERPL-MCNC: 12 MG/DL (ref 7–18)
CALCIUM SERPL-MCNC: 8.3 MG/DL (ref 8.5–10.1)
CHLORIDE SERPL-SCNC: 102 MMOL/L (ref 98–107)
CO2 SERPL-SCNC: 28.2 MMOL/L (ref 21–32)
CREAT SERPL-MCNC: 0.7 MG/DL (ref 0.6–1.3)
EOSINOPHIL NFR BLD: 0 % (ref 0–7)
ERYTHROCYTE [DISTWIDTH] IN BLOOD BY AUTOMATED COUNT: 12.9 % (ref 11.5–14.5)
GLOBULIN SER-MCNC: 3.7 G/L
GLUCOSE SERPL-MCNC: 293 MG/DL (ref 74–106)
HCT VFR BLD CALC: 32.7 % (ref 36–48)
HGB BLD-MCNC: 11 G/DL (ref 12–16)
IMM GRANULOCYTES NFR BLD: 0.2 % (ref 0–5)
LYMPHOCYTES NFR BLD AUTO: 12.6 % (ref 15–50)
MAGNESIUM SERPL-MCNC: 1.7 MG/DL (ref 1.8–2.4)
MCH RBC QN AUTO: 30.1 PG (ref 26–34)
MCHC RBC AUTO-ENTMCNC: 33.6 G/DL (ref 31–37)
MCV RBC: 89.3 FL (ref 80–100)
MONOCYTES NFR BLD: 3.1 % (ref 2–11)
NEUTROPHILS NFR BLD AUTO: 84.1 % (ref 40–80)
OSMOLALITY SERPL CALC.SUM OF ELEC: 278 MOSM/KG (ref 275–300)
PHOSPHATE SERPL-MCNC: 3.7 MG/DL (ref 2.5–4.9)
PLATELET # BLD: 216 10X3/UL (ref 130–400)
PMV BLD AUTO: 10.8 FL (ref 7.4–10.4)
POTASSIUM SERPL-SCNC: 3.9 MMOL/L (ref 3.5–5.1)
PROT SERPL-MCNC: 6.5 G/DL (ref 6.4–8.2)
RBC # BLD AUTO: 3.66 10X6/UL (ref 4–5.4)
SODIUM SERPL-SCNC: 134 MMOL/L (ref 136–145)
WBC # BLD AUTO: 4.5 10X3/UL (ref 4.8–10.8)

## 2018-09-02 VITALS — SYSTOLIC BLOOD PRESSURE: 168 MMHG | DIASTOLIC BLOOD PRESSURE: 96 MMHG

## 2018-09-02 LAB
ALBUMIN SERPL-MCNC: 3.1 G/DL (ref 3.4–5)
ALP SERPL-CCNC: 89 U/L (ref 46–116)
ALT SERPL-CCNC: 50 U/L (ref 10–68)
ANION GAP SERPL CALC-SCNC: 8.4 MMOL/L (ref 8–16)
APPEARANCE UR: CLEAR
BASOPHILS NFR BLD AUTO: 0.1 % (ref 0–2)
BILIRUB SERPL-MCNC: 0.25 MG/DL (ref 0.2–1.3)
BILIRUB SERPL-MCNC: NEGATIVE MG/DL
BUN SERPL-MCNC: 10 MG/DL (ref 7–18)
CALCIUM SERPL-MCNC: 8.6 MG/DL (ref 8.5–10.1)
CHLORIDE SERPL-SCNC: 102 MMOL/L (ref 98–107)
CO2 SERPL-SCNC: 31 MMOL/L (ref 21–32)
COLOR UR: YELLOW
CREAT SERPL-MCNC: 0.6 MG/DL (ref 0.6–1.3)
EOSINOPHIL NFR BLD: 0.3 % (ref 0–7)
ERYTHROCYTE [DISTWIDTH] IN BLOOD BY AUTOMATED COUNT: 13.1 % (ref 11.5–14.5)
GLOBULIN SER-MCNC: 3.8 G/L
GLUCOSE SERPL-MCNC: 222 MG/DL (ref 74–106)
GLUCOSE SERPL-MCNC: 250 MG/DL
HCT VFR BLD CALC: 34.2 % (ref 36–48)
HGB BLD-MCNC: 11.6 G/DL (ref 12–16)
IMM GRANULOCYTES NFR BLD: 0.3 % (ref 0–5)
KETONES UR STRIP-MCNC: NEGATIVE MG/DL
LYMPHOCYTES NFR BLD AUTO: 26 % (ref 15–50)
MAGNESIUM SERPL-MCNC: 2.2 MG/DL (ref 1.8–2.4)
MCH RBC QN AUTO: 30.4 PG (ref 26–34)
MCHC RBC AUTO-ENTMCNC: 33.9 G/DL (ref 31–37)
MCV RBC: 89.5 FL (ref 80–100)
MONOCYTES NFR BLD: 7.5 % (ref 2–11)
NEUTROPHILS NFR BLD AUTO: 65.8 % (ref 40–80)
NITRITE UR-MCNC: NEGATIVE MG/ML
OSMOLALITY SERPL CALC.SUM OF ELEC: 281 MOSM/KG (ref 275–300)
PH UR STRIP: 8 [PH] (ref 5–6)
PHOSPHATE SERPL-MCNC: 3.4 MG/DL (ref 2.5–4.9)
PLATELET # BLD: 221 10X3/UL (ref 130–400)
PMV BLD AUTO: 11 FL (ref 7.4–10.4)
POTASSIUM SERPL-SCNC: 3.4 MMOL/L (ref 3.5–5.1)
PROT SERPL-MCNC: 6.9 G/DL (ref 6.4–8.2)
PROT UR-MCNC: NEGATIVE MG/DL
RBC # BLD AUTO: 3.82 10X6/UL (ref 4–5.4)
SODIUM SERPL-SCNC: 138 MMOL/L (ref 136–145)
SP GR UR STRIP: 1.01 (ref 1–1.02)
UROBILINOGEN UR-MCNC: NORMAL MG/DL
WBC # BLD AUTO: 7.7 10X3/UL (ref 4.8–10.8)

## 2018-11-01 ENCOUNTER — HOSPITAL ENCOUNTER (EMERGENCY)
Dept: HOSPITAL 84 - D.ER | Age: 53
Discharge: HOME | End: 2018-11-01
Payer: COMMERCIAL

## 2018-11-01 ENCOUNTER — HOSPITAL ENCOUNTER (OUTPATIENT)
Dept: HOSPITAL 84 - D.CT | Age: 53
Discharge: HOME | End: 2018-11-01
Attending: FAMILY MEDICINE
Payer: COMMERCIAL

## 2018-11-01 VITALS — BODY MASS INDEX: 28 KG/M2 | HEIGHT: 67 IN | WEIGHT: 178.37 LBS

## 2018-11-01 VITALS — DIASTOLIC BLOOD PRESSURE: 97 MMHG | SYSTOLIC BLOOD PRESSURE: 174 MMHG

## 2018-11-01 VITALS — BODY MASS INDEX: 27.9 KG/M2

## 2018-11-01 DIAGNOSIS — I25.810: ICD-10-CM

## 2018-11-01 DIAGNOSIS — K57.92: ICD-10-CM

## 2018-11-01 DIAGNOSIS — L50.9: ICD-10-CM

## 2018-11-01 DIAGNOSIS — E11.9: ICD-10-CM

## 2018-11-01 DIAGNOSIS — M32.9: ICD-10-CM

## 2018-11-01 DIAGNOSIS — R11.0: ICD-10-CM

## 2018-11-01 DIAGNOSIS — I45.10: ICD-10-CM

## 2018-11-01 DIAGNOSIS — R00.0: ICD-10-CM

## 2018-11-01 DIAGNOSIS — K50.10: Primary | ICD-10-CM

## 2018-11-01 DIAGNOSIS — R10.32: Primary | ICD-10-CM

## 2018-11-01 DIAGNOSIS — M35.00: ICD-10-CM

## 2018-11-01 DIAGNOSIS — K21.9: ICD-10-CM

## 2018-11-01 LAB
ALBUMIN SERPL-MCNC: 3.8 G/DL (ref 3.4–5)
ALP SERPL-CCNC: 101 U/L (ref 46–116)
ALT SERPL-CCNC: 35 U/L (ref 10–68)
AMYLASE SERPL-CCNC: 29 U/L (ref 25–115)
ANION GAP SERPL CALC-SCNC: 14.9 MMOL/L (ref 8–16)
BASOPHILS NFR BLD AUTO: 0 % (ref 0–2)
BILIRUB SERPL-MCNC: 0.29 MG/DL (ref 0.2–1.3)
BUN SERPL-MCNC: 12 MG/DL (ref 7–18)
CALCIUM SERPL-MCNC: 9.3 MG/DL (ref 8.5–10.1)
CHLORIDE SERPL-SCNC: 101 MMOL/L (ref 98–107)
CO2 SERPL-SCNC: 27.7 MMOL/L (ref 21–32)
CREAT SERPL-MCNC: 0.7 MG/DL (ref 0.6–1.3)
EOSINOPHIL NFR BLD: 1 % (ref 0–7)
ERYTHROCYTE [DISTWIDTH] IN BLOOD BY AUTOMATED COUNT: 12.4 % (ref 11.5–14.5)
GLOBULIN SER-MCNC: 4 G/L
GLUCOSE SERPL-MCNC: 206 MG/DL (ref 74–106)
HCT VFR BLD CALC: 35.6 % (ref 36–48)
HGB BLD-MCNC: 12.7 G/DL (ref 12–16)
IMM GRANULOCYTES NFR BLD: 0.4 % (ref 0–5)
LIPASE SERPL-CCNC: 60 U/L (ref 73–393)
LYMPHOCYTES NFR BLD AUTO: 29.6 % (ref 15–50)
MCH RBC QN AUTO: 31.7 PG (ref 26–34)
MCHC RBC AUTO-ENTMCNC: 35.7 G/DL (ref 31–37)
MCV RBC: 88.8 FL (ref 80–100)
MONOCYTES NFR BLD: 8.1 % (ref 2–11)
NEUTROPHILS NFR BLD AUTO: 60.9 % (ref 40–80)
OSMOLALITY SERPL CALC.SUM OF ELEC: 284 MOSM/KG (ref 275–300)
PLATELET # BLD: 238 10X3/UL (ref 130–400)
PMV BLD AUTO: 10.5 FL (ref 7.4–10.4)
POTASSIUM SERPL-SCNC: 3.6 MMOL/L (ref 3.5–5.1)
PROT SERPL-MCNC: 7.8 G/DL (ref 6.4–8.2)
RBC # BLD AUTO: 4.01 10X6/UL (ref 4–5.4)
SODIUM SERPL-SCNC: 140 MMOL/L (ref 136–145)
TROPONIN I SERPL-MCNC: 0.02 NG/ML (ref 0–0.06)
WBC # BLD AUTO: 4.8 10X3/UL (ref 4.8–10.8)

## 2018-11-05 ENCOUNTER — HOSPITAL ENCOUNTER (OUTPATIENT)
Dept: HOSPITAL 84 - D.LABREF | Age: 53
Discharge: HOME | End: 2018-11-05
Attending: INTERNAL MEDICINE
Payer: COMMERCIAL

## 2018-11-05 VITALS — BODY MASS INDEX: 27.9 KG/M2

## 2018-11-05 DIAGNOSIS — E78.5: Primary | ICD-10-CM

## 2018-11-05 LAB
ALT SERPL-CCNC: 26 U/L (ref 10–68)
CHOLEST/HDLC SERPL: 2.9 RATIO (ref 2.3–4.1)
CK SERPL-CCNC: 191 UL (ref 21–215)
HDLC SERPL-MCNC: 54 MG/DL (ref 32–96)
LDL-HDL RATIO: 1.6 RATIO (ref 1.5–3.5)
LDLC SERPL-MCNC: 86 MG/DL (ref 0–100)
TRIGL SERPL-MCNC: 94 MG/DL (ref 30–200)

## 2019-03-02 ENCOUNTER — HOSPITAL ENCOUNTER (EMERGENCY)
Dept: HOSPITAL 84 - D.ER | Age: 54
Discharge: HOME | End: 2019-03-02
Payer: COMMERCIAL

## 2019-03-02 VITALS — BODY MASS INDEX: 28 KG/M2 | WEIGHT: 178.37 LBS | HEIGHT: 67 IN

## 2019-03-02 VITALS — SYSTOLIC BLOOD PRESSURE: 152 MMHG | DIASTOLIC BLOOD PRESSURE: 70 MMHG

## 2019-03-02 DIAGNOSIS — M94.0: ICD-10-CM

## 2019-03-02 DIAGNOSIS — R07.9: Primary | ICD-10-CM

## 2019-03-02 LAB
ALBUMIN SERPL-MCNC: 3.3 G/DL (ref 3.4–5)
ALP SERPL-CCNC: 99 U/L (ref 46–116)
ALT SERPL-CCNC: 35 U/L (ref 10–68)
ANION GAP SERPL CALC-SCNC: 10.3 MMOL/L (ref 8–16)
APTT BLD: 22.6 SECONDS (ref 22.8–39.4)
BASOPHILS NFR BLD AUTO: 0 % (ref 0–2)
BILIRUB SERPL-MCNC: 0.14 MG/DL (ref 0.2–1.3)
BUN SERPL-MCNC: 18 MG/DL (ref 7–18)
CALCIUM SERPL-MCNC: 8.8 MG/DL (ref 8.5–10.1)
CHLORIDE SERPL-SCNC: 102 MMOL/L (ref 98–107)
CK MB SERPL-MCNC: 1.7 U/L (ref 0–3.6)
CK SERPL-CCNC: 112 UL (ref 21–215)
CO2 SERPL-SCNC: 29.6 MMOL/L (ref 21–32)
CREAT SERPL-MCNC: 0.9 MG/DL (ref 0.6–1.3)
D DIMER PPP FEU-MCNC: 0.52 UG/MLFEU (ref 0.2–0.54)
EOSINOPHIL NFR BLD: 0.9 % (ref 0–7)
ERYTHROCYTE [DISTWIDTH] IN BLOOD BY AUTOMATED COUNT: 12.4 % (ref 11.5–14.5)
GLOBULIN SER-MCNC: 4 G/L
GLUCOSE SERPL-MCNC: 363 MG/DL (ref 74–106)
HCT VFR BLD CALC: 37.8 % (ref 36–48)
HGB BLD-MCNC: 12.9 G/DL (ref 12–16)
IMM GRANULOCYTES NFR BLD: 0.2 % (ref 0–5)
INR PPP: 0.9 (ref 0.85–1.17)
LYMPHOCYTES NFR BLD AUTO: 27.9 % (ref 15–50)
MAGNESIUM SERPL-MCNC: 2 MG/DL (ref 1.8–2.4)
MCH RBC QN AUTO: 31.3 PG (ref 26–34)
MCHC RBC AUTO-ENTMCNC: 34.1 G/DL (ref 31–37)
MCV RBC: 91.7 FL (ref 80–100)
MONOCYTES NFR BLD: 7.6 % (ref 2–11)
NEUTROPHILS NFR BLD AUTO: 63.4 % (ref 40–80)
OSMOLALITY SERPL CALC.SUM OF ELEC: 292 MOSM/KG (ref 275–300)
PLATELET # BLD: 257 10X3/UL (ref 130–400)
PMV BLD AUTO: 11.2 FL (ref 7.4–10.4)
POTASSIUM SERPL-SCNC: 3.9 MMOL/L (ref 3.5–5.1)
PROT SERPL-MCNC: 7.3 G/DL (ref 6.4–8.2)
PROTHROMBIN TIME: 11.7 SECONDS (ref 11.6–15)
RBC # BLD AUTO: 4.12 10X6/UL (ref 4–5.4)
SODIUM SERPL-SCNC: 138 MMOL/L (ref 136–145)
TROPONIN I SERPL-MCNC: < 0.017 NG/ML (ref 0–0.06)
WBC # BLD AUTO: 4.6 10X3/UL (ref 4.8–10.8)

## 2019-04-26 ENCOUNTER — HOSPITAL ENCOUNTER (OUTPATIENT)
Dept: HOSPITAL 84 - D.ER | Age: 54
Setting detail: OBSERVATION
LOS: 2 days | Discharge: HOME | End: 2019-04-28
Attending: INTERNAL MEDICINE | Admitting: INTERNAL MEDICINE
Payer: COMMERCIAL

## 2019-04-26 VITALS — SYSTOLIC BLOOD PRESSURE: 147 MMHG | DIASTOLIC BLOOD PRESSURE: 67 MMHG

## 2019-04-26 VITALS
WEIGHT: 277.58 LBS | HEIGHT: 67 IN | WEIGHT: 277.58 LBS | BODY MASS INDEX: 43.57 KG/M2 | BODY MASS INDEX: 43.57 KG/M2 | BODY MASS INDEX: 43.57 KG/M2 | HEIGHT: 67 IN

## 2019-04-26 VITALS — DIASTOLIC BLOOD PRESSURE: 81 MMHG | SYSTOLIC BLOOD PRESSURE: 172 MMHG

## 2019-04-26 VITALS — DIASTOLIC BLOOD PRESSURE: 73 MMHG | SYSTOLIC BLOOD PRESSURE: 133 MMHG

## 2019-04-26 VITALS — SYSTOLIC BLOOD PRESSURE: 133 MMHG | DIASTOLIC BLOOD PRESSURE: 73 MMHG

## 2019-04-26 DIAGNOSIS — E83.42: ICD-10-CM

## 2019-04-26 DIAGNOSIS — I25.110: Primary | ICD-10-CM

## 2019-04-26 DIAGNOSIS — M32.9: ICD-10-CM

## 2019-04-26 DIAGNOSIS — E78.5: ICD-10-CM

## 2019-04-26 DIAGNOSIS — F32.9: ICD-10-CM

## 2019-04-26 DIAGNOSIS — M35.00: ICD-10-CM

## 2019-04-26 DIAGNOSIS — I10: ICD-10-CM

## 2019-04-26 DIAGNOSIS — K21.9: ICD-10-CM

## 2019-04-26 DIAGNOSIS — N17.9: ICD-10-CM

## 2019-04-26 DIAGNOSIS — E11.9: ICD-10-CM

## 2019-04-26 DIAGNOSIS — G40.909: ICD-10-CM

## 2019-04-26 LAB
ALBUMIN SERPL-MCNC: 3.4 G/DL (ref 3.4–5)
ALP SERPL-CCNC: 94 U/L (ref 46–116)
ALT SERPL-CCNC: 29 U/L (ref 10–68)
ANION GAP SERPL CALC-SCNC: 15.4 MMOL/L (ref 8–16)
APTT BLD: 23.8 SECONDS (ref 22.8–39.4)
BASOPHILS NFR BLD AUTO: 0.2 % (ref 0–2)
BILIRUB SERPL-MCNC: 0.18 MG/DL (ref 0.2–1.3)
BUN SERPL-MCNC: 21 MG/DL (ref 7–18)
CALCIUM SERPL-MCNC: 8.9 MG/DL (ref 8.5–10.1)
CHLORIDE SERPL-SCNC: 99 MMOL/L (ref 98–107)
CK MB SERPL-MCNC: 1.4 U/L (ref 0–3.6)
CK MB SERPL-MCNC: 2.2 U/L (ref 0–3.6)
CK SERPL-CCNC: 102 UL (ref 21–215)
CK SERPL-CCNC: 87 UL (ref 21–215)
CO2 SERPL-SCNC: 24.5 MMOL/L (ref 21–32)
CREAT SERPL-MCNC: 0.9 MG/DL (ref 0.6–1.3)
EOSINOPHIL NFR BLD: 0.9 % (ref 0–7)
ERYTHROCYTE [DISTWIDTH] IN BLOOD BY AUTOMATED COUNT: 12.5 % (ref 11.5–14.5)
GLOBULIN SER-MCNC: 4.1 G/L
GLUCOSE SERPL-MCNC: 247 MG/DL (ref 74–106)
HCT VFR BLD CALC: 35.8 % (ref 36–48)
HGB BLD-MCNC: 12.4 G/DL (ref 12–16)
IMM GRANULOCYTES NFR BLD: 0.3 % (ref 0–5)
INR PPP: 0.92 (ref 0.85–1.17)
LYMPHOCYTES NFR BLD AUTO: 19.6 % (ref 15–50)
MAGNESIUM SERPL-MCNC: 1.5 MG/DL (ref 1.8–2.4)
MCH RBC QN AUTO: 31 PG (ref 26–34)
MCHC RBC AUTO-ENTMCNC: 34.6 G/DL (ref 31–37)
MCV RBC: 89.5 FL (ref 80–100)
MONOCYTES NFR BLD: 5.1 % (ref 2–11)
NEUTROPHILS NFR BLD AUTO: 73.9 % (ref 40–80)
OSMOLALITY SERPL CALC.SUM OF ELEC: 280 MOSM/KG (ref 275–300)
PLATELET # BLD: 257 10X3/UL (ref 130–400)
PMV BLD AUTO: 10.5 FL (ref 7.4–10.4)
POTASSIUM SERPL-SCNC: 3.9 MMOL/L (ref 3.5–5.1)
PROT SERPL-MCNC: 7.5 G/DL (ref 6.4–8.2)
PROTHROMBIN TIME: 11.9 SECONDS (ref 11.6–15)
RBC # BLD AUTO: 4 10X6/UL (ref 4–5.4)
SODIUM SERPL-SCNC: 135 MMOL/L (ref 136–145)
TROPONIN I SERPL-MCNC: 0.02 NG/ML (ref 0–0.06)
TROPONIN I SERPL-MCNC: 0.03 NG/ML (ref 0–0.06)
WBC # BLD AUTO: 5.7 10X3/UL (ref 4.8–10.8)

## 2019-04-26 NOTE — OP
PATIENT NAME:  DIANE KLEIN                        MEDICAL RECORD: [id number]
:61                                             LOCATION:D.M2     D.2118
                                                         ADMISSION DATE:19
SURGEON:  EMIR ROSARIO MD             
 
 
DATE OF OPERATION:  2019
 
PROCEDURES:
1.  Left heart catheterization.
2.  Selective coronary angiography.
3.  Left ventriculogram.
4.  Vein graft angiography.
5.  LIMA angiography.
 
INDICATIONS:  Unstable angina and coronary artery disease.
 
PROCEDURE IN DETAIL:  After informed consent was obtained with detailed
description of risks and benefits as well as alternative therapies, the patient
elected to proceed with angiogram and heart catheterization.  The right femoral
area was prepped and draped in normal sterile fashion.  The right femoral artery
was cannulated via modified Seldinger technique with placement of 5-[place]
sheath.  All catheters were exchanged through the sheath.
 
FINDINGS:  Left ventriculogram performed in standard 30-degree BARRAZA view reveals
global hypokinesis throughout all segments.  Overall ejection fraction is 40%.
 
SELECTIVE CORONARY ANGIOGRAPHY:
1.  Left main is with no significant angiographic disease.
2.  Left anterior descending is totally occluded.
3.  Left circumflex has diffuse disease with total occlusion of the first obtuse
marginal.
4.  Right coronary is patent with severe diffuse disease and small vessel
disease distally.
5.  LIMA to LAD is widely patent.  Distal LAD is small and diffusely diseased,
too small for stenting.
6.  Vein graft to circumflex obtuse marginal is patent.  The distal obtuse
marginal is patent.
 
OVERALL IMPRESSION:  Three-vessel coronary artery disease with severe diffuse
disease, especially of the small vessels.  At this time, this is only amenable
to medical management.  I would add calcium channel blocker and nitrate as blood
pressure tolerates it.  Consider Ranexa after this.
 
TRANSINT:[id number] Voice Confirmation ID: [id number] DOCUMENT ID: [id number]
                                           
                                           EMIR ROSARIO MD             
 
 
 
CC:                                                             [id number]
DICTATION DATE: 19 1119     :     19 1305      ADM IN  
                                                                              
[id number] [place] [address]

## 2019-04-26 NOTE — CN
PATIENT NAME:DIANE SAHNI                            MEDICAL RECORD: [id number]
: 61                                              LOCATION:D.M2 D.2118
ADMIT DATE: 19                                       ACCOUNT: [id number]
CONSULTING PHYSICIAN:    EMIR ROSARIO MD             
                                               
REFERRING PHYSICIAN:     MANNY QUINTERO MD               
 
 
DATE OF CONSULTATION:  2019
 
DIAGNOSES:
1.  Unstable angina.
2.  Abnormal ECG.
3.  Coronary artery disease.
4.  Status post coronary artery bypass graft surgery.
5.  Hypertension.
6.  Hyperlipidemia.
7.  Diabetes.
 
HISTORY OF PRESENT ILLNESS:  Ms. Sahni has been having episodes of chest
discomfort.  She is set to see us Monday.  She does have a history of coronary
artery disease, 5-vessel bypass in November.  Her chest pain has been escalating
in an unstable fashion.  She presented yesterday with chest pain.  She did not
have T-wave inversions initially on her EKG.  Her second EKG does have T-wave
inversion.  She has continued to have episodes of chest pain overnight.  She is
having chest pain this morning as well.  It is a typical anginal pain.  SHE DOES
HAVE A CONTRAST ALLERGY.
 
PHYSICAL EXAMINATION:
GENERAL APPEARANCE:  Well-nourished, well-developed, appears stated age.  Level
of distress, comfortable. 
PSYCHIATRIC:  Mental status, alert, normal affect.  Orientation, oriented to
time, place and person.  
EYES:  Lids and conjunctiva, noninjected.  No discharge, no pallor. 
ENT:  Lips, teeth, gums, normal dentition.  Oropharynx, no cyanosis, no pallor. 
NECK:  Carotid arteries, bilateral normal upstroke, no bruits, no thrills. 
JUGULAR VEINS:  No jugular venous pressure or distention. 
CERVICAL LYMPH NODES:  Nontender, nonenlarged.  
THYROID:  Not enlarged.  Nontender.  No nodules. 
LUNGS:  Respiratory effort, unlabored. 
CHEST:  Normal curvature.  No thoracic deformity.  No chest wall tenderness. 
Percussion, resonant.  Auscultation, clear.  No wheezes, no rales, no rhonchi. 
CARDIOVASCULAR:  Precordial exam, nondisplaced.  No heaves or pericardial
thrills.  Rate and rhythm, regular.  Heart sounds, normal S1, normal S2.  No S3,
no gallop, no rub.  Systolic murmur, not heard.  Diastolic murmur, not heard. 
EXTREMITIES:  No cyanosis, no edema.  Peripheral pulses, full and equal in all
extremities, except as noted.  No bruits appreciated. 
ABDOMEN:  Soft, nondistended.  Normal aorta.  No bruit.  Nontender.  No masses. 
Liver, nontender, no hepatomegaly.  Spleen, nontender, no splenomegaly.  
MUSCULOSKELETAL:  No joint tenderness.  No joint swelling.  No erythema.  
NEUROLOGICAL:  Normal gait, normal strength, normal tone.
SKIN:  Warm and dry.
 
IMPRESSION:  At this time, her troponin is normal.  Her heart rate is in the 60s
and 70s and her systolic blood pressure is 101.  She is on optimal medical
management, but continues to have unstable symptomatology.  We will proceed with
coronary angiography in the a.m. after premedicating for contrast allergy.
 
 
 
 
CONSULT REPORT                                 [id number]    DIANE SAHNI          
 
 
TRANSINT:[id number] Voice Confirmation ID: [id number] DOCUMENT ID: [id number]
                                           
                                           EMIR ROSARIO MD             
 
 
 
 
 
 
 
 
 
 
 
 
 
 
 
 
 
 
 
 
 
 
 
 
 
 
 
 
 
 
 
 
 
 
 
 
 
 
 
 
 
 
 
 
CC:                                                             [id number]
DICTATION DATE: 19 1028     :     19 1036      ADM IN  
                                                                              
[id number] [place] [address]

## 2019-04-26 NOTE — HEMODYNAMI
PATIENT:DIANE KLEIN                               MEDICAL RECORD: [id number]
: 61                                            LOCATION:D.     D.2118
[id number]                                       ADMISSION DATE: 19
 
 
 Generatedon:201911:17
Patient name: DIANE KLEIN Patient #: [id number] Visit #: [id number] SSN: 
: 1965
Date of study: 2019
Page: Of
Hemodynamic Procedure Report
****************************
Patient Data
Patient Demographics
Procedure consent was obtained
First Name: DIANE           Gender: Female
Last Name: LATOYA          : 1965
[facility] Initial: CHANCE        Age: 54 year(s)
Patient #: [id number]       Race: Unknown
Visit #: [id number]
Accession #:
[id number]
Additional ID: [id number]
Contact details
Address: [address] [place] [address]   Phone: [phone]
circle
State: [place]
City: [place]
Zip code: [address]
Past Medical History
Allergies
Allergen        Reaction        Date         Comments
Reported
Other allergy                   11/10/2017   IV contrast, Sulfa
Other allergy                   2018    Iodine, Sulfa
Other allergy                   2019    Contrast, Sulfa,
Hydrocodone,
Morphine.
Admission
Admission Data
Admission Date: 2019   Admission Time: 14:41
Admit Source: Other
Room #: D.2118
Lab Results
Lab Result Date: 2019  Lab Result Time: 5:12
Biochemistry
Name         Units    Result                Min      Max
BUN          mg/dl    13       --(--*-)--   7        18
Creatinine   mg/dl    0.6      --(*---)--   0.6      1.3
CBC
Name         Units    Result                Min      Max
Hematocrit   %        34.4     *-(----)--   42       54
Hemoglobin   g/dl     11.9     *-(----)--   13.5     17.5
Procedure
Procedure Types
Cath Procedure
Diagnostic Procedure
LHC
LHC w/Coronaries w/Grafts
 
Procedure Description
Procedure Date
Procedure Date: 2019
Procedure Start Time: 11:07
Procedure End Time: 11:16
Procedure Staff
Name                            Function
Ronaldo Molina MD                Performing Physician
Charlie Marquez RT                  Monitor
Rhiannon Randhawa RT             Scrub
Angel Garza RN                Nurse
Procedure Data
Cath Procedure
Fluoroscopy
Diagnostic fluoroscopy      Total fluoroscopy Time: 2
time: 2 min                 min
Diagnostic fluoroscopy      Total fluoroscopy dose: 457
dose: 457 mGy               mGy
Contrast Material
Contrast Material Type                       Amount (ml)
Isovue 300                                   47
Entry Location
Entry     Primary  Successful  Side  Size  Upsize Upsize Entry    Closure Succes
sful  Closure
Location                             (Fr)  1 (Fr) 2 (Fr) Remarks  Device        
      Remarks
Femoral                        Right 5 Fr                         Exoseal
artery
Estimated blood loss: 5 ml
Diagnostic catheters
Device Type               Used For           End Catheter
Placement
MULTIPACK Pigtail 5 Fr    Procedure
catheter
MULTIPACK JL 4.0 5Fr      Procedure
catheter
MULTIPACK 3DRC 5Fr        Procedure
catheter
DIAGNOSTIC AR2 MOD 5 Fr   Procedure
catheter (419052H)
Procedure Complications
No complications
Procedure Medications
Medication           Administration Route Dosage
Oxygen               etCO2 Nasal cannula  2 l/min
Heparin Flush Bag    added to field       2 bags
(1000units/500ml NS)
0.9% NaCl            I.V.                 100 ml/hr
Lidocaine 2%         added to field       20
Plavix               P.O.                 75 mg
Fentanyl             I.V.                 50 mcg
Versed               I.V.                 1 mg
Fentanyl             I.V.                 50 mcg
Versed               I.V.                 1 mg
Fentanyl             I.V.                 50 mcg
Fentanyl             I.V.                 50 mcg
Hemodynamics
Rest
HGB: 11.9 (g/dl) Heart Rate: 69 (bpm)
Pressure Samples
 
Time     Site     Value (mmHg) Purpose      Heart      Use
Rate(bpm)
11:08    LV       121/23,28    Snapshot     60
11:08    LV       134/14,29    Snapshot     80
Snapshots
Pre Cath      Intra         NCS           Post Cath
Vital Signs
Time     Heart  Resp   SPO2 etCO2   NIBP (mmHg)  Rhythm  Pain    Sedation
Rate   (ipm)  (%)  (mmHg)                       Status  Level
(bpm)
10:44:11 77     17     98   0       204/103(165) NSR     0 (11)  10(A)
, No
pain
10:48:37 69     17     98   0       191/96(152)  NSR     0 (11)  10(A)
, No
pain
10:53:07 72     16     100  0       189/86(143)  NSR     0 (11)  10(A)
, No
pain
10:57:34 75     17     98   0       189/102(153) NSR     0 (11)  10(A)
, No
pain
11:01:52 66     17     94   27      167/83(132)  NSR     0 (11)  10(A)
, No
pain
11:06:12 68     17     99   46.5    168/85(146)  NSR     0 (11)  10(A)
, No
pain
11:10:34 69     16     100  46.5    166/79(126)  NSR     0 (11)  9(A)
, No
pain
11:14:50 70     1      99   48.7    130/71(100)  NSR     0 (11)  9(A)
, No
pain
11:17:09 72     5      98   55.5    121/72(100)  NSR     0 (11)  9(A)
, No
pain
Medications
Time     Medication       Route   Dose  Verified Delivered Reason       Notes  E
ffectiveness
by       by
10:45:14 Oxygen           etCO2   2     Ronaldo  Angel    Per
Nasal   l/min Tracy Garza RN physician
cannula
10:45:22 Heparin Flush    added   2     Ronaldo  Angel    used for
Bag              to      bags  Tracy Garza RN procedure
(1000units/500ml field
NS)
10:45:30 0.9% NaCl        I.V.    100   Ronaldo  Angel    Per
ml/hr Tracy Garza RN physician
10:45:39 Lidocaine 2%     added   20ml  Ronaldo  Angel    used for
to      vial  Tracy Garza RN procedure
field
10:45:56 Plavix           P.O.    75 mg Ronaldo  Angel    for
Tracy Garza RN antiplatelet
therapy
11:06:25 Fentanyl         I.V.    50    Ronaldo  Angel    for sedation
[facility]   Tracy Garza RN
11:06:32 Versed           I.V.    1 mg  Ronaldo  Angel    for sedation
Tracy Garza RN
 
11:07:58 Fentanyl         I.V.    50    Ronaldo  Angel    for sedation
[facility]   Tracy Garza RN
11:08:00 Versed           I.V.    1 mg  Ronaldo  Angel    for sedation
Tracy Garza RN
11:10:42 Fentanyl         I.V.    50    Ronaldo  Angel    for sedation
jeffrey Garza RN
11:12:53 Fentanyl         I.V.    50    Ronaldo  Angel    for sedation
[facility]   Tracy Garza RN
Procedure Log
Time     Note
10:17:53 Informed consent obtained and on chart
10:17:57 Admit Source: Other
10:20:03 Diagnostic Cath status Elective
10:20:17 H&P Date Dictated: 2019 Within 30 days and on
chart..
10:20:57 Lab Result : BUN 13 mg/dl
10:20:57 Lab Result : Hematocrit 34.4 %
10:20:57 Lab Result : Hemoglobin 11.9 g/dl
10::57 Lab Result : Creatinine 0.6 mg/dl
10:21:02 Lab results completed and on chart.
10:24:21 Angel Garza RN sent for patient. Start room use.
10:36:32 Patient received from Med II to CCL 1 Alert and
oriented. Tansferred to table in Supine position.
10:36:32 Warm blankets applied, and charbel hugger turned on for
patient comfort.
10:36:33 Correct patient and procedure confirmed by team.
10:36:34 ECG and BP/O2 sat monitors applied to patient.
10:36:35 Pre-procedure instructions explained to patient.
10:36:35 Pre-op teaching completed and patient verbalized
understanding.
10:36:36 Family in patients room.
10:36:37 Patient NPO since Midnight.
10:41:56 Vital chart was started
10:41:57 Full Disclosure recording started
10:45:14 Oxygen 2 l/min etCO2 Nasal cannula was administered by
Angel Garza RN; Per physician;
10:45:22 Heparin Flush Bag (1000units/500ml NS) 2 bags added to
field was administered by Angel Garza RN; used for
procedure;
10:45:30 0.9% NaCl 100 ml/hr I.V. was administered by Angel Garza RN; Per physician;
10:45:39 Lidocaine 2% 20ml vial added to field was administered
by Angel Garza RN; used for procedure;
10:45:56 Plavix 75 mg P.O. was administered by Angel Garza
RN; for antiplatelet therapy;
10:48:56 Baseline sample Acquired.
10:48:59 Rhythm: sinus rhythm
10:49:41 Patient allergic to Other allergyContrast, Sulfa,
Hydrocodone, Morphine.
10:49:43 Is the patient allergic to Iodine/contrast media? Yes.
10:49:44 Was the patient premedicated? Yes
10:49:47 Is patient on blood thinner?Yes
10:49:50 **ACC** The patient was administered the following
blood thiners within the last 24 hours: **ACC**Plavix
10:49:57 Patient diabetic? Yes.
10:50:01 If diabetic: On Metformin? No
10:50:07 Previous problem with sedation/anesthesia? No ?
10:50:07 Snore? Yes
10:50:08 Sleep apnea? No
10:50:09 Deviated septum? No
 
10:50:09 Opens mouth fully? Yes
10:50:10 Sticks out tongue? Yes
10:50:16 Airway obstruction? Yes Mild Asthma
10:50:22 Pre procedure: right posterior tibial pulse 1+
Palpable, but thready & weak; easily obliterated
10:50:25 Patient pain scale 0/10 ?.
10:50:31 IV patent on arrival in right forearm with 0.9% NaCl
at [facility].
10:50:35 Right groin area was prepped with chlora-prep and
draped in sterile fashion
10:50:36 Alarms reviewed by R. N.
10:50:36 Sharps counted by scrub and verified by R.N.
10:50:38 Use device set Femoral Dx
10:50:39 ACIST Syringe (45534) opened to sterile field.
10:50:39 Bag Decanter (2002S) opened to sterile field.
10:50:40 Medline Cath Pack ([id number]) opened to sterile field.
10:50:41 ACIST Hand Control (47899) opened to sterile field.
10:50:41 ACIST Manifold (01625) opened to sterile field.
10:50:42 Tegaderm 4 x 4 (1626W) opened to sterile field.
10:50:43 SHEATH 5FR Pryor ([id number]) opened to sterile field.
10:50:43 DIAGNOSTIC WIRE .035 260cm J wire (167993) opened to
sterile field.
10:50:44 DIAGNOSTIC Multipack 5Fr catheter set (DQ4725) opened
to sterile field.
10:50:53 Physician paged
10:56:10 Zero performed for pressure channel P1
11:06:10 Physician arrived
11:06:10 --------ALL STOP TIME OUT------
11:06:11 Final Timeout: patient, procedure, and site verified
with staff and physician. All members of the team are
in agreement.
11:06:13 Right groin site verified by team.
11:06:16 Maximum allowable Isovue 300 dose 300ml. Physician
notified. (300ml for normal creatinines. For patients
with creatinine of 1.7 or higher multiply weight(kg) x
5 divided by creatinine.)
11:06:19 Fire Safety Assessment: A--An alcohol-based skin
anteseptic being used preoperatively., C--Open oxygen
or nitrous oxide is being used., D--An ESU, laser, or
fiber-optic light is being used.
11:06:22 Physical assessment completed. ASA score P 2 - A
patient with mild systemic disease as per Ronaldo Molina MD.
11:06:24 Sedation plan: IV Moderate Sedation Medication:Versed,
Fentanyl
11:06:25 Fentanyl 50 mcg I.V. was administered by Angel Garza
RN; for sedation;
11:06:32 Versed 1 mg I.V. was administered by Angle Garza RN;
for sedation;
11::52 Zero performed for pressure channel P1
11::56 Procedure started.
11::58 Fentanyl 50 mcg I.V. was administered by Angel Garza
RN; for sedation;
11::58 Local anesthetic to right femoral artery with
Lidocaine 2% by Ronaldo Molina MD.**INITIAL ACCESS
ONLY**
11:08:00 Versed 1 mg I.V. was administered by Angel Garza RN;
for sedation;
11:08:05 A 5 Fr sheath was inserted into the Right Femoral
artery
 
11:08:14 A MULTIPACK Pigtail 5 Fr catheter was advanced over
the wire and used for Procedure.
11:08:20 LV gram done using BARRAZA
11::23 Injector settings: Ml/sec: 10, Volume: 20,
11:08:24 LV hemodynamics recorded.
11:08:39 EF : 40 %
11:08:43 Catheter exchanged over wire.
11:08:47 A MULTIPACK JL 4.0 5Fr catheter was advanced over the
wire and used for Procedure.
11:09:11 Zero performed for pressure channel P1
11:09:46 Zero performed for pressure channel P1
11:09:49 Zero performed for pressure channel P1
11:10:01 LCA angiography performed.
11:10:02 Catheter exchanged over wire.
11:10:09 A MULTIPACK 3DRC 5Fr catheter was advanced over the
wire and used for Procedure.
11:10:12 LIMA to LAD angiography performed.
11:10:42 Fentanyl 50 mcg I.V. was administered by Angel Garza RN; for sedation;
11::22 SVG to Circ angiography performed.
11:11:31 Catheter exchanged over wire.
11:11:35 A DIAGNOSTIC AR2 MOD 5 Fr catheter ([id number]) was
advanced over the wire and used for Procedure.
11:12:27 RCA angiography performed.
11:12:53 Fentanyl 50 mcg I.V. was administered by Angel Garza
RN; for sedation;
11:13:00 Catheter removed.
11:13:01 EXOSEAL 5Fr () opened to sterile field.
11:13:17 Sheath removed intact; hemostasis achieved with
Exoseal to the Right Femoral artery.
11:13:18 Procedure ended.(Physican Out)
11:13:39 Fluoroscopy time 02.00 minutes.
11:13:42 Flurop Dose total: 457
11:13:42 Fluoroscopy dose: 457 mGy
11:13:46 Contrast amount:Isovue 300 47ml.
11:14:14 Sharps counted by scrub and verified by R.N.
11:14:15 Insertion/operative site no bleeding no hematoma.
11:14:18 Post-op/insertion site Right Femoral artery dressed
using a 4 x 4 and Tegaderm.
11:14:21 Post right femoral artery:stable, soft, clean and dry
11:14:22 Post Procedure Pulses reassessed and unchanged
11:14:25 Post-procedure physical assessment completed. ASA
score P 2 - A patient with mild systemic disease as
per Ronaldo Molina MD.
11:14:27 Post procedure rhythm: unchanged.
11:15:25 Estimated blood loss: 5 ml
11:15:27 Post procedure instruction explained to
patient.Patient verbalizes understanding.
11:15:28 Patient needs reinforcement of post procedure
teaching.
11:15:38 Procedure type changed to Cath procedure, Diagnostic
procedure, LHC, LHC w/Coronaries w/Grafts
11:16:12 Procedure and supply charges have been captured,
reviewed, submitted and are correct.
11:16:14 Procedure Complication : No complications
11:16:16 Vital chart was stopped
11:16:16 See physician's report for complete and final results.
11:16:18 Report given to PCU.
11:16:20 Patient transfered to PCU with Stretcher.
11:16:22 Procedure ended.
 
11:16:22 Full Disclosure recording stopped
11:16:27 End room use (Document Last)
Device Usage
Item Name   Manufacture  Quantity  Catalog    Hospital Part    Current Minimal L
ot# /
Number     Charge   Number  Stock   Stock   Serial#
Code
ACIST       Acist        1         96679      351644   052865  [id number]  20
Syringe     Medical
(58867)     Systems Inc
Bag         Microtek     1         S      795245   17295   171875  5
Decanter    Medical Inc.
()
Medline     Medline      1         [id number]  972652   70999   608165  5
Cath Pack
([id number])
ACIST Hand  Acist        1         79776      857272   090658  785309  5
Control     Medical
(06943)     Systems Inc
ACIST       Acist        1         71249      483567   312024  147516  5
Manifold    Medical
(18433)     Systems Inc
Tegaderm 4  3M           1         1626W      258918   478907  272760  5
x 4 (1626W)
SHEATH 5FR  Terumo       1         SFG845     682973   321805  414379  5
Pryor
(RFA663)
DIAGNOSTIC  St Tae      1         257460     430748   653645  286656  30
WIRE .035
260cm J
wire
(200746)
DIAGNOSTIC  Cardinal     1         CQ2789     755775   41408   411731  30
Multipack   Health
5Fr
catheter
set
(CW7911)
MULTIPACK   Cardinal     1                                     123716  5
Pigtail 5   Health
Fr catheter
MULTIPACK   Cardinal     1                                     091112  5
JL 4.0 5Fr  Health
catheter
MULTIPACK   Cardinal     1                                     196837  5
3DRC 5Fr    Health
catheter
DIAGNOSTIC  Cardinal     1         366258L    214157   425648  060045  20
AR2 MOD 5   Health
Fr catheter
(216015S)
EXOSEAL 5Fr Cardinal     1               733937   567657  595508  10
()     Health
Signature Audit [place]
Stage           Time        Signature      Unsigned
Intra-Procedure 2019   Charlie Marquez
11:17:28 AM RT(R)
Signatures
Monitor : Charlie Marquez RT Signature :
______________________________
 
Date : ______________ Time :
______________
 
 
 
 
 
 
 
 
 
 
 
 
 
 
 
 
 
 
 
 
 
 
 
 
 
 
 
 
 
 
 
 
 
 
 
 
 
 
 
 
 
 
 
 
 
 
 
 
 
 
 
 
 
[address] [address]

## 2019-04-26 NOTE — NUR
RESUMING PATIENT CARE. PATIENT IS ALERT AND ORIENTED. RESPIRATIONS ARE EVEN
AND UNLABORED. NO S/S OF DISTRESS. NO C/O PAIN. PATIENT DENIES NEEDS. CALL
LIGHT WITHIN REACH. WILL CPOC.

## 2019-04-27 VITALS — SYSTOLIC BLOOD PRESSURE: 186 MMHG | DIASTOLIC BLOOD PRESSURE: 80 MMHG

## 2019-04-27 VITALS — SYSTOLIC BLOOD PRESSURE: 101 MMHG | DIASTOLIC BLOOD PRESSURE: 51 MMHG

## 2019-04-27 VITALS — SYSTOLIC BLOOD PRESSURE: 177 MMHG | DIASTOLIC BLOOD PRESSURE: 65 MMHG

## 2019-04-27 VITALS — SYSTOLIC BLOOD PRESSURE: 141 MMHG | DIASTOLIC BLOOD PRESSURE: 75 MMHG

## 2019-04-27 VITALS — DIASTOLIC BLOOD PRESSURE: 84 MMHG | SYSTOLIC BLOOD PRESSURE: 175 MMHG

## 2019-04-27 VITALS — DIASTOLIC BLOOD PRESSURE: 71 MMHG | SYSTOLIC BLOOD PRESSURE: 161 MMHG

## 2019-04-27 LAB
ANION GAP SERPL CALC-SCNC: 13.7 MMOL/L (ref 8–16)
APPEARANCE UR: CLEAR
BASOPHILS NFR BLD AUTO: 0 % (ref 0–2)
BILIRUB SERPL-MCNC: NEGATIVE MG/DL
BUN SERPL-MCNC: 16 MG/DL (ref 7–18)
CALCIUM SERPL-MCNC: 8.8 MG/DL (ref 8.5–10.1)
CHLORIDE SERPL-SCNC: 101 MMOL/L (ref 98–107)
CK MB SERPL-MCNC: 1.2 U/L (ref 0–3.6)
CK MB SERPL-MCNC: 1.6 U/L (ref 0–3.6)
CK SERPL-CCNC: 86 UL (ref 21–215)
CK SERPL-CCNC: 90 UL (ref 21–215)
CO2 SERPL-SCNC: 24.2 MMOL/L (ref 21–32)
COLOR UR: YELLOW
CREAT SERPL-MCNC: 0.8 MG/DL (ref 0.6–1.3)
EOSINOPHIL NFR BLD: 0 % (ref 0–7)
ERYTHROCYTE [DISTWIDTH] IN BLOOD BY AUTOMATED COUNT: 12.3 % (ref 11.5–14.5)
GLUCOSE SERPL-MCNC: 1000 MG/DL
GLUCOSE SERPL-MCNC: 388 MG/DL (ref 74–106)
HCT VFR BLD CALC: 35.1 % (ref 36–48)
HGB BLD-MCNC: 12 G/DL (ref 12–16)
IMM GRANULOCYTES NFR BLD: 0.2 % (ref 0–5)
KETONES UR STRIP-MCNC: NEGATIVE MG/DL
LYMPHOCYTES NFR BLD AUTO: 5.3 % (ref 15–50)
MCH RBC QN AUTO: 30.7 PG (ref 26–34)
MCHC RBC AUTO-ENTMCNC: 34.2 G/DL (ref 31–37)
MCV RBC: 89.8 FL (ref 80–100)
MONOCYTES NFR BLD: 0.6 % (ref 2–11)
NEUTROPHILS NFR BLD AUTO: 93.9 % (ref 40–80)
NITRITE UR-MCNC: NEGATIVE MG/ML
OSMOLALITY SERPL CALC.SUM OF ELEC: 286 MOSM/KG (ref 275–300)
PH UR STRIP: 7 [PH] (ref 5–6)
PLATELET # BLD: 252 10X3/UL (ref 130–400)
PMV BLD AUTO: 10.5 FL (ref 7.4–10.4)
POTASSIUM SERPL-SCNC: 3.9 MMOL/L (ref 3.5–5.1)
PROT UR-MCNC: (no result) MG/DL
RBC # BLD AUTO: 3.91 10X6/UL (ref 4–5.4)
RBC #/AREA URNS HPF: (no result) /HPF (ref 0–5)
SODIUM SERPL-SCNC: 135 MMOL/L (ref 136–145)
SP GR UR STRIP: 1.01 (ref 1–1.02)
SQUAMOUS #/AREA URNS HPF: (no result) /HPF (ref 0–5)
TROPONIN I SERPL-MCNC: 0.02 NG/ML (ref 0–0.06)
TROPONIN I SERPL-MCNC: 0.03 NG/ML (ref 0–0.06)
UROBILINOGEN UR-MCNC: NORMAL MG/DL
WBC # BLD AUTO: 8.2 10X3/UL (ref 4.8–10.8)
WBC #/AREA URNS HPF: (no result) /HPF (ref 0–5)

## 2019-04-27 NOTE — NUR
INITIAL ROUNDS AND ASSESSMENT COMPLETED. PT RESTING IN BED. ALERT/ORIENTED. IV
TO RFA WITH NS @ 100ML/HR. SR PER TELEMETRY. INSTRUCT ON NPO AFTER MIDNIGHT
FOR HEART CATH IN AM. MONITOR AND CPOC.

## 2019-04-28 VITALS — SYSTOLIC BLOOD PRESSURE: 111 MMHG | DIASTOLIC BLOOD PRESSURE: 55 MMHG

## 2019-04-28 VITALS — SYSTOLIC BLOOD PRESSURE: 130 MMHG | DIASTOLIC BLOOD PRESSURE: 50 MMHG

## 2019-04-28 VITALS — SYSTOLIC BLOOD PRESSURE: 141 MMHG | DIASTOLIC BLOOD PRESSURE: 60 MMHG

## 2019-04-28 VITALS — DIASTOLIC BLOOD PRESSURE: 74 MMHG | SYSTOLIC BLOOD PRESSURE: 153 MMHG

## 2019-04-28 LAB
ANION GAP SERPL CALC-SCNC: 11.5 MMOL/L (ref 8–16)
ANION GAP SERPL CALC-SCNC: 14.4 MMOL/L (ref 8–16)
BASOPHILS NFR BLD AUTO: 0.1 % (ref 0–2)
BASOPHILS NFR BLD AUTO: 0.1 % (ref 0–2)
BUN SERPL-MCNC: 13 MG/DL (ref 7–18)
BUN SERPL-MCNC: 13 MG/DL (ref 7–18)
CALCIUM SERPL-MCNC: 8.8 MG/DL (ref 8.5–10.1)
CALCIUM SERPL-MCNC: 8.8 MG/DL (ref 8.5–10.1)
CHLORIDE SERPL-SCNC: 101 MMOL/L (ref 98–107)
CHLORIDE SERPL-SCNC: 103 MMOL/L (ref 98–107)
CO2 SERPL-SCNC: 25.3 MMOL/L (ref 21–32)
CO2 SERPL-SCNC: 27.3 MMOL/L (ref 21–32)
CREAT SERPL-MCNC: 0.6 MG/DL (ref 0.6–1.3)
CREAT SERPL-MCNC: 0.6 MG/DL (ref 0.6–1.3)
EOSINOPHIL NFR BLD: 0.7 % (ref 0–7)
EOSINOPHIL NFR BLD: 0.7 % (ref 0–7)
ERYTHROCYTE [DISTWIDTH] IN BLOOD BY AUTOMATED COUNT: 12.4 % (ref 11.5–14.5)
ERYTHROCYTE [DISTWIDTH] IN BLOOD BY AUTOMATED COUNT: 12.5 % (ref 11.5–14.5)
GLUCOSE SERPL-MCNC: 150 MG/DL (ref 74–106)
GLUCOSE SERPL-MCNC: 154 MG/DL (ref 74–106)
HCT VFR BLD CALC: 34.4 % (ref 36–48)
HCT VFR BLD CALC: 34.8 % (ref 36–48)
HGB BLD-MCNC: 11.8 G/DL (ref 12–16)
HGB BLD-MCNC: 11.9 G/DL (ref 12–16)
IMM GRANULOCYTES NFR BLD: 0.1 % (ref 0–5)
IMM GRANULOCYTES NFR BLD: 0.3 % (ref 0–5)
LYMPHOCYTES NFR BLD AUTO: 26 % (ref 15–50)
LYMPHOCYTES NFR BLD AUTO: 27 % (ref 15–50)
MCH RBC QN AUTO: 30.6 PG (ref 26–34)
MCH RBC QN AUTO: 30.8 PG (ref 26–34)
MCHC RBC AUTO-ENTMCNC: 33.9 G/DL (ref 31–37)
MCHC RBC AUTO-ENTMCNC: 34.6 G/DL (ref 31–37)
MCV RBC: 89.1 FL (ref 80–100)
MCV RBC: 90.2 FL (ref 80–100)
MONOCYTES NFR BLD: 7.1 % (ref 2–11)
MONOCYTES NFR BLD: 7.8 % (ref 2–11)
NEUTROPHILS NFR BLD AUTO: 64.8 % (ref 40–80)
NEUTROPHILS NFR BLD AUTO: 65.3 % (ref 40–80)
OSMOLALITY SERPL CALC.SUM OF ELEC: 276 MOSM/KG (ref 275–300)
OSMOLALITY SERPL CALC.SUM OF ELEC: 278 MOSM/KG (ref 275–300)
PLATELET # BLD: 247 10X3/UL (ref 130–400)
PLATELET # BLD: 254 10X3/UL (ref 130–400)
PMV BLD AUTO: 10.1 FL (ref 7.4–10.4)
PMV BLD AUTO: 10.7 FL (ref 7.4–10.4)
POTASSIUM SERPL-SCNC: 3.7 MMOL/L (ref 3.5–5.1)
POTASSIUM SERPL-SCNC: 3.8 MMOL/L (ref 3.5–5.1)
RBC # BLD AUTO: 3.86 10X6/UL (ref 4–5.4)
RBC # BLD AUTO: 3.86 10X6/UL (ref 4–5.4)
SODIUM SERPL-SCNC: 137 MMOL/L (ref 136–145)
SODIUM SERPL-SCNC: 138 MMOL/L (ref 136–145)
WBC # BLD AUTO: 7 10X3/UL (ref 4.8–10.8)
WBC # BLD AUTO: 7.1 10X3/UL (ref 4.8–10.8)

## 2019-04-29 NOTE — MORECARE
CASE MANAGEMENT DISCHARGE SUMMARY
 
 
PATIENT: DIANE KLEIN                  UNIT: [id number]
ACCOUNT#: [id number]                       ADM DATE: 19
AGE: 54     : [birth year]  SEX: F            ROOM/BED: D.1165    
AUTHOR: JENNIE SAUNDERS                             PHYSICIAN:                               
 
REFERRING PHYSICIAN: MANNY QUINTERO MD               
DATE OF SERVICE: 19
Discharge Plan
 
 
Patient Name: DIANE KLEIN
Facility: [facility]
Encounter #: [id number]
Medical Record #: [id number]
: 1965
Planned Disposition: Home
Anticipated Discharge Date: 19
 
Discharge Date: 2019
Expected LOS: 2
Initial Reviewer: [id number]
Initial Review Date: 2019
Generated: 19   9:29 am 
  
 
 
 
 
 
 
 
Patient Name: DIANE KLEIN
 
Encounter #: [id number]
Page [id number]
 
 
 
 
 
Electronically Signed by JENNIE SAUNDERS on 19 at 0830
 
 
 
 
 
 
**All edits/amendments must be made on the electronic document**
 
DICTATION DATE: 19     : NEFTALI  19     
RPT#: [id number]                                DC DATE:19
                                               STATUS: DIS IN  
[facility]
[address] [place] [address]
***END OF REPORT***

## 2019-11-25 NOTE — NUR
DR. DIANE CALLED WITH REPORT OF X-RAY. FINDINGS REPORTED. NO FURTHER ORDERS. Severe MR with flail leaflet